# Patient Record
Sex: MALE | Race: WHITE | NOT HISPANIC OR LATINO | Employment: OTHER | URBAN - METROPOLITAN AREA
[De-identification: names, ages, dates, MRNs, and addresses within clinical notes are randomized per-mention and may not be internally consistent; named-entity substitution may affect disease eponyms.]

---

## 2019-12-05 ENCOUNTER — APPOINTMENT (OUTPATIENT)
Dept: RADIOLOGY | Facility: AMBULARY SURGERY CENTER | Age: 69
End: 2019-12-05
Attending: SURGERY
Payer: COMMERCIAL

## 2019-12-05 VITALS
DIASTOLIC BLOOD PRESSURE: 79 MMHG | SYSTOLIC BLOOD PRESSURE: 146 MMHG | HEIGHT: 68 IN | WEIGHT: 155 LBS | HEART RATE: 69 BPM | BODY MASS INDEX: 23.49 KG/M2

## 2019-12-05 DIAGNOSIS — M79.642 LEFT HAND PAIN: ICD-10-CM

## 2019-12-05 DIAGNOSIS — M19.042 ARTHRITIS OF FINGER OF LEFT HAND: Primary | ICD-10-CM

## 2019-12-05 PROCEDURE — 20600 DRAIN/INJ JOINT/BURSA W/O US: CPT | Performed by: SURGERY

## 2019-12-05 PROCEDURE — 73130 X-RAY EXAM OF HAND: CPT

## 2019-12-05 PROCEDURE — 99203 OFFICE O/P NEW LOW 30 MIN: CPT | Performed by: SURGERY

## 2019-12-05 RX ORDER — TRIAMCINOLONE ACETONIDE 40 MG/ML
20 INJECTION, SUSPENSION INTRA-ARTICULAR; INTRAMUSCULAR
Status: COMPLETED | OUTPATIENT
Start: 2019-12-05 | End: 2019-12-05

## 2019-12-05 RX ORDER — MONTELUKAST SODIUM 10 MG/1
10 TABLET ORAL AS NEEDED
COMMUNITY
End: 2021-12-17

## 2019-12-05 RX ORDER — LIDOCAINE HYDROCHLORIDE 10 MG/ML
1 INJECTION, SOLUTION INFILTRATION; PERINEURAL
Status: COMPLETED | OUTPATIENT
Start: 2019-12-05 | End: 2019-12-05

## 2019-12-05 RX ORDER — ROSUVASTATIN CALCIUM 10 MG/1
10 TABLET, COATED ORAL DAILY
COMMUNITY
End: 2021-03-11

## 2019-12-05 RX ORDER — DICLOFENAC SODIUM 75 MG/1
75 TABLET, DELAYED RELEASE ORAL 2 TIMES DAILY
COMMUNITY
End: 2020-10-23 | Stop reason: ALTCHOICE

## 2019-12-05 RX ORDER — AMLODIPINE BESYLATE AND ATORVASTATIN CALCIUM 5; 10 MG/1; MG/1
1 TABLET, FILM COATED ORAL DAILY
COMMUNITY
End: 2021-03-11

## 2019-12-05 RX ORDER — BUPIVACAINE HYDROCHLORIDE 2.5 MG/ML
0.5 INJECTION, SOLUTION INFILTRATION; PERINEURAL
Status: COMPLETED | OUTPATIENT
Start: 2019-12-05 | End: 2019-12-05

## 2019-12-05 RX ORDER — FINASTERIDE 5 MG/1
2.5 TABLET, FILM COATED ORAL
COMMUNITY
End: 2021-08-24 | Stop reason: SDUPTHER

## 2019-12-05 RX ADMIN — TRIAMCINOLONE ACETONIDE 20 MG: 40 INJECTION, SUSPENSION INTRA-ARTICULAR; INTRAMUSCULAR at 09:43

## 2019-12-05 RX ADMIN — BUPIVACAINE HYDROCHLORIDE 0.5 ML: 2.5 INJECTION, SOLUTION INFILTRATION; PERINEURAL at 09:43

## 2019-12-05 RX ADMIN — LIDOCAINE HYDROCHLORIDE 1 ML: 10 INJECTION, SOLUTION INFILTRATION; PERINEURAL at 09:43

## 2019-12-05 NOTE — PROGRESS NOTES
Mary VALLE  Attending, Orthopaedic Surgery  Hand, Wrist, and Elbow Surgery  [facility]      ORTHOPAEDIC HAND, WRIST, AND ELBOW OFFICE  VISIT       ASSESSMENT/PLAN:      77 y o  male with left small finger arthritis, specifically DIP and PIP joints  Treatment options were discussed with Shaun today  CSI and Voltaren Gel were discussed  Shelli Davis elected to proceed with a left small finger PIP joint CSI  Left small finger PIP joint CSI was performed in the office today, without complication  The CSI may be repeated every 3 months as needed  Voltaren Gel was prescribed and sent to his pharmacy electronically  He may use the gel up to 4x a day over his DIP and PIP joints  He was advised to keep his hands warm  He may soak his hands in warm water or use a warm compress  The patient verbalized understanding of exam findings and treatment plan  We engaged in the shared decision-making process and treatment options were discussed at length with the patient  Surgical and conservative management discussed today along with risks and benefits  Diagnoses and all orders for this visit:    Arthritis of finger of left hand  -     Small joint arthrocentesis: R small PIP    Left hand pain  -     XR hand 3+ vw left; Future    Other orders  -     rosuvastatin (CRESTOR) 10 MG tablet; Take 10 mg by mouth daily  -     finasteride (PROSCAR) 5 mg tablet; Take 5 mg by mouth daily  -     amLODIPine-atorvastatin (CADUET) 5-10 MG per tablet; Take 1 tablet by mouth daily  -     montelukast (SINGULAIR) 10 mg tablet; Take 10 mg by mouth daily at bedtime  -     diclofenac (VOLTAREN) 75 mg EC tablet; Take 75 mg by mouth 2 (two) times a day  -     diclofenac sodium (VOLTAREN) 1 %; Apply 2 g topically 4 (four) times a day        Follow Up:  Return in about 6 weeks (around 1/16/2020)      To Do Next Visit:  Re-evaluation of current issue      General Discussions:  Osteoarthritis:  The anatomy and physiology of osteoarthritis was discussed with the patient today in the office  Deterioration of the articular cartilage eventually leads to altered mobility at the joint, resulting in joint subluxation, osteophyte formation, cystic changes, as well as subchondral sclerosis  Eventually, pain, limited mobility, and compensatory hypermobility at surrounding joints may develop  While normal activity and usage of the joint may provide a painful experience to the patient, this typically does not result in damage to the limb  Treatment options include splints to decreased joint edema, pain, and inflammation  Therapy exercises to strengthen the surrounding musculature may relieve pain, but do not alter the overall continued development of osteoarthritis  Oral medications, topical medications, corticosteroid injections may decrease pain and increase overall function  Eventually, some patients may require surgical intervention  ____________________________________________________________________________________________________________________________________________      CHIEF COMPLAINT:  Chief Complaint   Patient presents with    Left Hand - Pain       SUBJECTIVE:  Brianna Goode is a 77y o  year old RHD male who presents to the office today for left small finger pain  Dannis Inks states that he has been experiencing pain to his left long finger DIP and PIP joints for years  He denies any injury or trama  He notes decreased flexion and difficulty making a tight fist  He has tried oral NSAID's in the past for pain, without significant relief  He describes his pain as achy        Pain/symptom timing:  Worse during the day when active  Pain/symptom context:  Worse with activites and work  Pain/symptom modifying factors:  Rest makes better, activities make worse  Pain/symptom associated signs/symptoms: none    Prior treatment   · NSAIDsNo   · Injections No   · Bracing/Orthotics No    Physical Therapy No     I have personally reviewed all the relevant PMH, PSH, SH, FH, Medications and allergies      PAST MEDICAL HISTORY:  History reviewed  No pertinent past medical history  PAST SURGICAL HISTORY:  History reviewed  No pertinent surgical history  FAMILY HISTORY:  History reviewed  No pertinent family history  SOCIAL HISTORY:  Social History     Tobacco Use    Smoking status: Never Smoker    Smokeless tobacco: Never Used   Substance Use Topics    Alcohol use: Not Currently    Drug use: Never       MEDICATIONS:    Current Outpatient Medications:     amLODIPine-atorvastatin (CADUET) 5-10 MG per tablet, Take 1 tablet by mouth daily, Disp: , Rfl:     diclofenac (VOLTAREN) 75 mg EC tablet, Take 75 mg by mouth 2 (two) times a day, Disp: , Rfl:     finasteride (PROSCAR) 5 mg tablet, Take 5 mg by mouth daily, Disp: , Rfl:     montelukast (SINGULAIR) 10 mg tablet, Take 10 mg by mouth daily at bedtime, Disp: , Rfl:     rosuvastatin (CRESTOR) 10 MG tablet, Take 10 mg by mouth daily, Disp: , Rfl:     ALLERGIES:  No Known Allergies        REVIEW OF SYSTEMS:  Review of Systems   Constitutional: Negative for chills, fever and unexpected weight change  HENT: Negative for hearing loss, nosebleeds and sore throat  Eyes: Negative for pain, redness and visual disturbance  Respiratory: Negative for cough, shortness of breath and wheezing  Cardiovascular: Negative for chest pain, palpitations and leg swelling  Gastrointestinal: Negative for abdominal pain, nausea and vomiting  Endocrine: Negative for polydipsia and polyuria  Genitourinary: Negative for difficulty urinating and hematuria  Musculoskeletal: Positive for arthralgias  Negative for joint swelling and myalgias  Skin: Negative for rash and wound  Neurological: Negative for dizziness, numbness and headaches  Psychiatric/Behavioral: Negative for decreased concentration, dysphoric mood and suicidal ideas  The patient is not nervous/anxious          VITALS:  Vitals: 12/05/19 0921   BP: 146/79   Pulse: 69       LABS:  HgA1c: No results found for: HGBA1C  BMP: No results found for: GLUCOSE, CALCIUM, NA, K, CO2, CL, BUN, CREATININE    _____________________________________________________  PHYSICAL EXAMINATION:  General: well developed and well nourished, alert, oriented times 3 and appears comfortable  Psychiatric: Normal  HEENT: Normocephalic, Atraumatic Trachea Midline, No torticollis  Pulmonary: No audible wheezing or respiratory distress   Cardiovascular: No pitting edema, 2+ radial pulse   Skin: No masses, erythema, lacerations, fluctation, ulcerations  Neurovascular: Sensation Intact to the Median, Ulnar, Radial Nerve, Motor Intact to the Median, Ulnar, Radial Nerve and Pulses Intact  Musculoskeletal: Normal, except as noted in detailed exam and in HPI        MUSCULOSKELETAL EXAMINATION:    Left small finger:     No erythema  No ecchymosis   No edema  PIP and DIP joint thickening   Full extension  Limited PIP joint flexion   Tender to palpation PIP and DIP joint, PIP more tender   Loose full composite fist possible   Brisk capillary refill     ___________________________________________________  STUDIES REVIEWED:  I have personally reviewed AP lateral and oblique radiographs of left hand demonstrate significant osteoarthritis of the DIP joints of the small and index fingers also degenerative changes less severe throughout including the PIP joint of the left small          PROCEDURES PERFORMED:  Small joint arthrocentesis: R small PIP  Date/Time: 12/5/2019 9:43 AM  Consent given by: patient  Site marked: site marked  Timeout: Immediately prior to procedure a time out was called to verify the correct patient, procedure, equipment, support staff and site/side marked as required   Supporting Documentation  Indications: pain   Procedure Details  Location: small finger - R small PIP  Preparation: Patient was prepped and draped in the usual sterile fashion  Needle size: 25 G  Ultrasound guidance: no  Medications administered: 0 5 mL bupivacaine 0 25 %; 1 mL lidocaine 1 %; 20 mg triamcinolone acetonide 40 mg/mL    Patient tolerance: patient tolerated the procedure well with no immediate complications  Dressing:  Sterile dressing applied           _____________________________________________________      Scribe Attestation    I,:   Kyara Ramirez am acting as a scribe while in the presence of the attending physician :        I,:   Estela Plummer MD personally performed the services described in this documentation    as scribed in my presence :

## 2020-10-23 DIAGNOSIS — M79.643 PAIN OF HAND, UNSPECIFIED LATERALITY: Primary | ICD-10-CM

## 2020-10-23 DIAGNOSIS — M19.042 ARTHRITIS OF FINGER OF LEFT HAND: Primary | ICD-10-CM

## 2020-10-23 DIAGNOSIS — M79.642 LEFT HAND PAIN: Primary | ICD-10-CM

## 2021-02-13 DIAGNOSIS — Z23 ENCOUNTER FOR IMMUNIZATION: ICD-10-CM

## 2021-02-24 ENCOUNTER — OFFICE VISIT (OUTPATIENT)
Dept: FAMILY MEDICINE CLINIC | Facility: OTHER | Age: 71
End: 2021-02-24
Payer: MEDICARE

## 2021-02-24 VITALS
BODY MASS INDEX: 24.48 KG/M2 | TEMPERATURE: 98.4 F | HEIGHT: 67 IN | HEART RATE: 75 BPM | SYSTOLIC BLOOD PRESSURE: 130 MMHG | DIASTOLIC BLOOD PRESSURE: 80 MMHG | WEIGHT: 156 LBS | OXYGEN SATURATION: 97 %

## 2021-02-24 DIAGNOSIS — E78.5 HYPERLIPIDEMIA, UNSPECIFIED HYPERLIPIDEMIA TYPE: ICD-10-CM

## 2021-02-24 DIAGNOSIS — K40.90 HERNIA, INGUINAL, RIGHT: ICD-10-CM

## 2021-02-24 DIAGNOSIS — Z12.5 SCREENING FOR PROSTATE CANCER: ICD-10-CM

## 2021-02-24 DIAGNOSIS — Z11.59 NEED FOR HEPATITIS C SCREENING TEST: ICD-10-CM

## 2021-02-24 DIAGNOSIS — I10 HYPERTENSION, UNSPECIFIED TYPE: ICD-10-CM

## 2021-02-24 DIAGNOSIS — M79.10 MYALGIA: Primary | ICD-10-CM

## 2021-02-24 PROCEDURE — 99203 OFFICE O/P NEW LOW 30 MIN: CPT | Performed by: FAMILY MEDICINE

## 2021-02-24 RX ORDER — ROSUVASTATIN CALCIUM 20 MG/1
20 TABLET, COATED ORAL
COMMUNITY
Start: 2020-11-13 | End: 2021-08-24 | Stop reason: SDUPTHER

## 2021-02-24 RX ORDER — MULTIVIT WITH MINERALS/LUTEIN
1000 TABLET ORAL DAILY
COMMUNITY

## 2021-02-24 RX ORDER — AMLODIPINE BESYLATE 5 MG/1
5 TABLET ORAL
COMMUNITY
Start: 2020-11-13 | End: 2021-08-24 | Stop reason: SDUPTHER

## 2021-02-24 RX ORDER — CHOLECALCIFEROL (VITAMIN D3) 25 MCG
1 CAPSULE ORAL
COMMUNITY

## 2021-02-24 RX ORDER — ASPIRIN 81 MG/1
81 TABLET, CHEWABLE ORAL
COMMUNITY

## 2021-02-24 RX ORDER — TURMERIC 400 MG
1 CAPSULE ORAL DAILY
COMMUNITY

## 2021-02-24 NOTE — PROGRESS NOTES
Assessment/Plan:    Hernia, inguinal, right  History and exam c/w nonincarcerated indirect inguinal hernia  Will refer to general surgery  Myalgia  Does not appear to be radicular or joint pain  Possibly caused by statin dosage increase  Will check CK before further workup which may require rheumatological labs or biopsy  Diagnoses and all orders for this visit:    Myalgia  -     CK (with reflex to MB); Future    Hernia, inguinal, right  -     Ambulatory referral to General Surgery; Future    Hypertension, unspecified type  -     Comprehensive metabolic panel; Future    Hyperlipidemia, unspecified hyperlipidemia type  -     Lipid Panel with Direct LDL reflex; Future    Need for hepatitis C screening test  -     Hepatitis C antibody; Future    Screening for prostate cancer  -     PSA, Total Screen; Future    Other orders  -     Cancel: Ambulatory referral for colonoscopy; Future  -     Cancel: Occult Blood, Fecal Immunochemical; Future  -     amLODIPine (NORVASC) 5 mg tablet  -     rosuvastatin (CRESTOR) 20 MG tablet  -     Cancel: Hepatitis C antibody; Future  -     diclofenac sodium (VOLTAREN) 50 mg EC tablet; Take 100 mg by mouth 2 (two) times a day  -     aspirin 81 mg chewable tablet; Chew 81 mg daily  -     Multiple Vitamin (MULTIVITAMIN ADULT PO); Take 1 tablet by mouth daily  -     Cancel: PSA, total and free; Future  -     Turmeric 400 MG CAPS; Take 1 capsule by mouth daily  -     Ascorbic Acid (vitamin C) 1000 MG tablet; Take 1,000 mg by mouth daily  -     Cholecalciferol (Vitamin D-3) 25 MCG (1000 UT) CAPS; Take 1 capsule by mouth          Subjective:      Patient ID: Landon Covert is a 72 y o  male  Pt [facility] to [facility] care and reports L thigh pain as well as R groin pain  He states that the L thigh pain has been present for about 6 months and seems to radiate from the front of his thigh up to his hip and sometimes into his back   He says he feels the pain a little bit in his R thigh as well but it is mostly in the L thigh  He describes the pain as a sensation of pressure or tightness that becomes severe with walking and climbing stairs  He reports resolution of the pain when lying down and also improvement from Voltaren tablets  He denies any back surgeries, any injuries, and any weakness or numbness  He does report that his Crestor dose was increased around the time the pain began  The patient also states that he has developed a bulge in his R groin since about 3 months ago that sometimes hurts a little  He reports lifting small weights as well as abdominal exercises but denies heavy lifting  He does endorse cocasional hard stools with some straining  Review of Systems   Constitutional: Negative for chills, fever and unexpected weight change  HENT: Negative for congestion, rhinorrhea and sore throat  Eyes: Negative for visual disturbance  Respiratory: Negative for cough and shortness of breath  Cardiovascular: Negative for chest pain and leg swelling  Gastrointestinal: Positive for constipation  Negative for abdominal pain, blood in stool, diarrhea, nausea and vomiting  Genitourinary: Negative for dysuria and hematuria  Musculoskeletal: Positive for arthralgias and myalgias  Skin: Negative for rash  Neurological: Negative for dizziness, light-headedness and headaches  Psychiatric/Behavioral: Negative for dysphoric mood  All other systems reviewed and are negative  Objective:      /80 (BP Location: Left arm, Patient Position: Sitting, Cuff Size: Large)   Pulse 75   Temp 98 4 °F (36 9 °C) (Temporal)   Ht 5' 6 5" (1 689 m)   Wt 70 8 kg (156 lb)   SpO2 97%   BMI 24 80 kg/m²          Physical Exam  Vitals signs reviewed  Constitutional:       General: He is not in acute distress  Appearance: He is well-developed  He is not diaphoretic  HENT:      Head: Normocephalic and atraumatic        Right Ear: External ear normal       Left Ear: External ear normal       Mouth/Throat:      Mouth: Mucous membranes are moist       Pharynx: Oropharynx is clear  No oropharyngeal exudate or posterior oropharyngeal erythema  Eyes:      Extraocular Movements: Extraocular movements intact  Conjunctiva/sclera: Conjunctivae normal       Pupils: Pupils are equal, round, and reactive to light  Neck:      Musculoskeletal: Normal range of motion and neck supple  Cardiovascular:      Rate and Rhythm: Normal rate and regular rhythm  Pulses: Normal pulses  Heart sounds: Normal heart sounds  No murmur  No friction rub  No gallop  Pulmonary:      Effort: Pulmonary effort is normal  No respiratory distress  Breath sounds: Normal breath sounds  No stridor  No wheezing, rhonchi or rales  Abdominal:      General: Bowel sounds are normal  There is no distension  Palpations: Abdomen is soft  There is no mass  Tenderness: There is no abdominal tenderness  There is no right CVA tenderness, left CVA tenderness or guarding  Hernia: A hernia (R inguinal) is present  Musculoskeletal: Normal range of motion  General: Deformity (Heberden's and Bouchards nodes) present  No tenderness (bilateral thighs)  Comments: Negative SLR bilaterally   Lymphadenopathy:      Cervical: No cervical adenopathy  Skin:     General: Skin is warm and dry  Findings: No rash  Neurological:      General: No focal deficit present  Mental Status: He is alert and oriented to person, place, and time  Sensory: No sensory deficit  Motor: No weakness     Psychiatric:         Mood and Affect: Mood normal          Behavior: Behavior normal

## 2021-02-25 ENCOUNTER — LAB (OUTPATIENT)
Dept: LAB | Facility: AMBULARY SURGERY CENTER | Age: 71
End: 2021-02-25
Payer: MEDICARE

## 2021-02-25 DIAGNOSIS — I10 HYPERTENSION, UNSPECIFIED TYPE: ICD-10-CM

## 2021-02-25 DIAGNOSIS — Z12.5 SCREENING FOR PROSTATE CANCER: ICD-10-CM

## 2021-02-25 DIAGNOSIS — Z11.59 NEED FOR HEPATITIS C SCREENING TEST: ICD-10-CM

## 2021-02-25 DIAGNOSIS — M79.10 MYALGIA: ICD-10-CM

## 2021-02-25 DIAGNOSIS — E78.5 HYPERLIPIDEMIA, UNSPECIFIED HYPERLIPIDEMIA TYPE: ICD-10-CM

## 2021-02-25 LAB
ALBUMIN SERPL BCP-MCNC: 3.8 G/DL (ref 3.5–5)
ALP SERPL-CCNC: 55 U/L (ref 46–116)
ALT SERPL W P-5'-P-CCNC: 25 U/L (ref 12–78)
ANION GAP SERPL CALCULATED.3IONS-SCNC: 1 MMOL/L (ref 4–13)
AST SERPL W P-5'-P-CCNC: 16 U/L (ref 5–45)
BILIRUB SERPL-MCNC: 0.45 MG/DL (ref 0.2–1)
BUN SERPL-MCNC: 22 MG/DL (ref 5–25)
CALCIUM SERPL-MCNC: 9.5 MG/DL (ref 8.3–10.1)
CHLORIDE SERPL-SCNC: 108 MMOL/L (ref 100–108)
CHOLEST SERPL-MCNC: 187 MG/DL (ref 50–200)
CK MB SERPL-MCNC: 1.3 % (ref 0–2.5)
CK MB SERPL-MCNC: 2.3 NG/ML (ref 0–5)
CK SERPL-CCNC: 179 U/L (ref 39–308)
CO2 SERPL-SCNC: 31 MMOL/L (ref 21–32)
CREAT SERPL-MCNC: 1.05 MG/DL (ref 0.6–1.3)
GFR SERPL CREATININE-BSD FRML MDRD: 72 ML/MIN/1.73SQ M
GLUCOSE P FAST SERPL-MCNC: 115 MG/DL (ref 65–99)
HCV AB SER QL: NORMAL
HDLC SERPL-MCNC: 47 MG/DL
LDLC SERPL CALC-MCNC: 118 MG/DL (ref 0–100)
POTASSIUM SERPL-SCNC: 4.4 MMOL/L (ref 3.5–5.3)
PROT SERPL-MCNC: 7.1 G/DL (ref 6.4–8.2)
PSA SERPL-MCNC: 0.3 NG/ML (ref 0–4)
SODIUM SERPL-SCNC: 140 MMOL/L (ref 136–145)
TRIGL SERPL-MCNC: 111 MG/DL

## 2021-02-25 PROCEDURE — 82553 CREATINE MB FRACTION: CPT

## 2021-02-25 PROCEDURE — 86803 HEPATITIS C AB TEST: CPT

## 2021-02-25 PROCEDURE — 82550 ASSAY OF CK (CPK): CPT

## 2021-02-25 PROCEDURE — 36415 COLL VENOUS BLD VENIPUNCTURE: CPT

## 2021-02-25 PROCEDURE — G0103 PSA SCREENING: HCPCS

## 2021-02-25 PROCEDURE — 80053 COMPREHEN METABOLIC PANEL: CPT

## 2021-02-25 PROCEDURE — 80061 LIPID PANEL: CPT

## 2021-02-25 NOTE — ASSESSMENT & PLAN NOTE
Does not appear to be radicular or joint pain  Possibly caused by statin dosage increase  Will check CK before further workup which may require rheumatological labs or biopsy

## 2021-03-03 ENCOUNTER — PREP FOR PROCEDURE (OUTPATIENT)
Dept: SURGERY | Facility: CLINIC | Age: 71
End: 2021-03-03

## 2021-03-03 ENCOUNTER — CONSULT (OUTPATIENT)
Dept: SURGERY | Facility: CLINIC | Age: 71
End: 2021-03-03
Payer: MEDICARE

## 2021-03-03 ENCOUNTER — APPOINTMENT (OUTPATIENT)
Dept: LAB | Facility: CLINIC | Age: 71
End: 2021-03-03
Payer: MEDICARE

## 2021-03-03 ENCOUNTER — OFFICE VISIT (OUTPATIENT)
Dept: LAB | Facility: CLINIC | Age: 71
End: 2021-03-03
Payer: MEDICARE

## 2021-03-03 VITALS — HEIGHT: 68 IN | WEIGHT: 156 LBS | BODY MASS INDEX: 23.64 KG/M2

## 2021-03-03 DIAGNOSIS — K40.90 INGUINAL HERNIA: Primary | ICD-10-CM

## 2021-03-03 DIAGNOSIS — K40.90 INGUINAL HERNIA: ICD-10-CM

## 2021-03-03 DIAGNOSIS — K40.90 HERNIA, INGUINAL, RIGHT: ICD-10-CM

## 2021-03-03 LAB
ERYTHROCYTE [DISTWIDTH] IN BLOOD BY AUTOMATED COUNT: 13.8 % (ref 11.6–15.1)
HCT VFR BLD AUTO: 44.6 % (ref 36.5–49.3)
HGB BLD-MCNC: 14.4 G/DL (ref 12–17)
MCH RBC QN AUTO: 29.8 PG (ref 26.8–34.3)
MCHC RBC AUTO-ENTMCNC: 32.3 G/DL (ref 31.4–37.4)
MCV RBC AUTO: 92 FL (ref 82–98)
PLATELET # BLD AUTO: 201 THOUSANDS/UL (ref 149–390)
PMV BLD AUTO: 10.9 FL (ref 8.9–12.7)
RBC # BLD AUTO: 4.83 MILLION/UL (ref 3.88–5.62)
WBC # BLD AUTO: 5.85 THOUSAND/UL (ref 4.31–10.16)

## 2021-03-03 PROCEDURE — 93005 ELECTROCARDIOGRAM TRACING: CPT

## 2021-03-03 PROCEDURE — 36415 COLL VENOUS BLD VENIPUNCTURE: CPT

## 2021-03-03 PROCEDURE — 85027 COMPLETE CBC AUTOMATED: CPT

## 2021-03-03 PROCEDURE — 99203 OFFICE O/P NEW LOW 30 MIN: CPT | Performed by: SURGERY

## 2021-03-03 NOTE — PROGRESS NOTES
Assessment/Plan:    Diagnoses and all orders for this visit:    Hernia, inguinal, right  -     Ambulatory referral to General Surgery      Risks and benefits for a right inguinal hernia repair including the potential for spermatic cord injury, recurrence, or pain issues were discussed with him and he agrees to proceed  Subjective:      Patient ID: Sylvia Galvez is a 80 y o  male  Patient presents for right inguinal hernia consult  States he has had a bulge and dull pain RLQ for 2 months  Limits his activities  Tends to feel some achiness by the end of the day  Very comfortable in the morning upon awakening  Discomfort and lump seems to progress as the day goes on  Denies any urinary or bowel complaints      The following portions of the patient's history were reviewed and updated as appropriate:     He  has a past medical history of Hypertension and Lung nodule  He  has a past surgical history that includes Shoulder surgery (Right, 2018); Bunionectomy (Right); and Hand surgery (Right)  His family history includes Diabetes in his mother; Heart attack in his father  He  reports that he quit smoking about 42 years ago  He has never used smokeless tobacco  He reports previous alcohol use of about 6 0 - 7 0 standard drinks of alcohol per week  He reports that he does not use drugs    Current Outpatient Medications   Medication Sig Dispense Refill    amLODIPine (NORVASC) 5 mg tablet       amLODIPine-atorvastatin (CADUET) 5-10 MG per tablet Take 1 tablet by mouth daily      Ascorbic Acid (vitamin C) 1000 MG tablet Take 1,000 mg by mouth daily      aspirin 81 mg chewable tablet Chew 81 mg daily      Cholecalciferol (Vitamin D-3) 25 MCG (1000 UT) CAPS Take 1 capsule by mouth      diclofenac sodium (VOLTAREN) 1 % Apply 2 g topically 4 (four) times a day 1 Tube 3    diclofenac sodium (VOLTAREN) 50 mg EC tablet Take 100 mg by mouth 2 (two) times a day      finasteride (PROSCAR) 5 mg tablet Take 2 5 mg by mouth daily       montelukast (SINGULAIR) 10 mg tablet Take 10 mg by mouth daily at bedtime      Multiple Vitamin (MULTIVITAMIN ADULT PO) Take 1 tablet by mouth daily      rosuvastatin (CRESTOR) 10 MG tablet Take 10 mg by mouth daily      rosuvastatin (CRESTOR) 20 MG tablet       Turmeric 400 MG CAPS Take 1 capsule by mouth daily       No current facility-administered medications for this visit  He has No Known Allergies       Review of Systems   All other systems reviewed and are negative  Objective:      Ht 5' 8" (1 727 m)   Wt 70 8 kg (156 lb)   BMI 23 72 kg/m²          Physical Exam  Constitutional:       Appearance: Normal appearance  He is normal weight  HENT:      Head: Normocephalic  Mouth/Throat:      Mouth: Mucous membranes are moist    Eyes:      Extraocular Movements: Extraocular movements intact  Neck:      Musculoskeletal: Normal range of motion and neck supple  Cardiovascular:      Rate and Rhythm: Normal rate and regular rhythm  Pulmonary:      Effort: Pulmonary effort is normal       Breath sounds: Normal breath sounds  Abdominal:      General: Abdomen is flat  Bowel sounds are normal       Palpations: Abdomen is soft  Hernia: A hernia (Reducible right inguinal hernia  No left inguinal hernia) is present  Skin:     General: Skin is warm and dry  Neurological:      Mental Status: He is alert and oriented to person, place, and time     Psychiatric:         Mood and Affect: Mood normal

## 2021-03-05 LAB
ATRIAL RATE: 52 BPM
P AXIS: 67 DEGREES
PR INTERVAL: 146 MS
QRS AXIS: 29 DEGREES
QRSD INTERVAL: 78 MS
QT INTERVAL: 418 MS
QTC INTERVAL: 388 MS
T WAVE AXIS: 43 DEGREES
VENTRICULAR RATE: 52 BPM

## 2021-03-05 PROCEDURE — 93010 ELECTROCARDIOGRAM REPORT: CPT | Performed by: INTERNAL MEDICINE

## 2021-03-06 PROBLEM — R73.03 PREDIABETES: Status: ACTIVE | Noted: 2021-03-06

## 2021-03-06 NOTE — RESULT ENCOUNTER NOTE
Discussed results with pt including nml CK level and elevated fasting blood glucose in prediabetes range  Pt says he will attempt to improve this with diet and exercise

## 2021-03-07 ENCOUNTER — IMMUNIZATIONS (OUTPATIENT)
Dept: FAMILY MEDICINE CLINIC | Facility: HOSPITAL | Age: 71
End: 2021-03-07

## 2021-03-07 DIAGNOSIS — Z23 ENCOUNTER FOR IMMUNIZATION: Primary | ICD-10-CM

## 2021-03-07 PROCEDURE — 0001A SARS-COV-2 / COVID-19 MRNA VACCINE (PFIZER-BIONTECH) 30 MCG: CPT

## 2021-03-07 PROCEDURE — 91300 SARS-COV-2 / COVID-19 MRNA VACCINE (PFIZER-BIONTECH) 30 MCG: CPT

## 2021-03-10 ENCOUNTER — ANESTHESIA EVENT (OUTPATIENT)
Dept: PERIOP | Facility: AMBULARY SURGERY CENTER | Age: 71
End: 2021-03-10
Payer: MEDICARE

## 2021-03-11 RX ORDER — CEFAZOLIN SODIUM 2 G/50ML
2000 SOLUTION INTRAVENOUS ONCE
Status: CANCELLED | OUTPATIENT
Start: 2021-03-15

## 2021-03-11 NOTE — PRE-PROCEDURE INSTRUCTIONS
Pre-Surgery Instructions:   Medication Instructions    amLODIPine (NORVASC) 5 mg tablet Instructed patient per Anesthesia Guidelines   Ascorbic Acid (vitamin C) 1000 MG tablet Patient was instructed by Physician and understands   aspirin 81 mg chewable tablet Patient was instructed by Physician and understands   Cholecalciferol (Vitamin D-3) 25 MCG (1000 UT) CAPS Patient was instructed by Physician and understands   diclofenac sodium (VOLTAREN) 1 % Patient was instructed by Physician and understands   diclofenac sodium (VOLTAREN) 50 mg EC tablet Patient was instructed by Physician and understands   finasteride (PROSCAR) 5 mg tablet Instructed patient per Anesthesia Guidelines   montelukast (SINGULAIR) 10 mg tablet Instructed patient per Anesthesia Guidelines   Multiple Vitamin (MULTIVITAMIN ADULT PO) Patient was instructed by Physician and understands   rosuvastatin (CRESTOR) 20 MG tablet Patient was instructed by Physician and understands   Turmeric 400 MG CAPS Patient was instructed by Physician and understands  Pre op and bathing instructions reviewed  Pt has hibiclens  Pt  Verbalized understanding of current visitor restrictions  Pt  Verbalized an understanding of all instructions reviewed and offers no concerns at this time  Instructed to avoid all ASA/NSAIDs and OTC Vit/Supp, Voltaren from now until after surgery   Tylenol ok prn( Pt takes ASA prophylacticly)  Instructed to take per normal schedule except DOS

## 2021-03-14 NOTE — ANESTHESIA PREPROCEDURE EVALUATION
Procedure:  INGUINAL HERNIA REPAIR (Right Groin)    Relevant Problems   CARDIO   (+) Hyperlipidemia   (+) Hypertension        Physical Exam    Airway    Mallampati score: II  TM Distance: >3 FB  Neck ROM: full     Dental   No notable dental hx     Cardiovascular      Pulmonary      Other Findings        Anesthesia Plan  ASA Score- 2     Anesthesia Type- general with ASA Monitors  Additional Monitors:   Airway Plan: LMA  Plan Factors-Exercise tolerance (METS): >4 METS  Chart reviewed  Patient is not a current smoker  Patient not instructed to abstain from smoking on day of procedure  Patient did not smoke on day of surgery  Induction- intravenous  Postoperative Plan-     Informed Consent- Anesthetic plan and risks discussed with patient  I personally reviewed this patient with the CRNA  Discussed and agreed on the Anesthesia Plan with the CRNA             Lab Results   Component Value Date    GLUF 115 (H) 02/25/2021    ALT 25 02/25/2021    AST 16 02/25/2021    BUN 22 02/25/2021    CALCIUM 9 5 02/25/2021     02/25/2021    CO2 31 02/25/2021    CREATININE 1 05 02/25/2021    HDL 47 02/25/2021    HCT 44 6 03/03/2021    HGB 14 4 03/03/2021     03/03/2021    K 4 4 02/25/2021    PSA 0 3 02/25/2021    TRIG 111 02/25/2021    WBC 5 85 03/03/2021

## 2021-03-15 ENCOUNTER — ANESTHESIA (OUTPATIENT)
Dept: PERIOP | Facility: AMBULARY SURGERY CENTER | Age: 71
End: 2021-03-15
Payer: MEDICARE

## 2021-03-15 ENCOUNTER — HOSPITAL ENCOUNTER (OUTPATIENT)
Facility: AMBULARY SURGERY CENTER | Age: 71
Setting detail: OUTPATIENT SURGERY
Discharge: HOME | End: 2021-03-15
Attending: SURGERY | Admitting: SURGERY
Payer: MEDICARE

## 2021-03-15 VITALS
SYSTOLIC BLOOD PRESSURE: 120 MMHG | OXYGEN SATURATION: 95 % | BODY MASS INDEX: 23.64 KG/M2 | TEMPERATURE: 97.4 F | WEIGHT: 156 LBS | HEIGHT: 68 IN | RESPIRATION RATE: 20 BRPM | HEART RATE: 68 BPM | DIASTOLIC BLOOD PRESSURE: 68 MMHG

## 2021-03-15 DIAGNOSIS — K40.90 HERNIA, INGUINAL, RIGHT: Primary | ICD-10-CM

## 2021-03-15 PROCEDURE — C1781 MESH (IMPLANTABLE): HCPCS | Performed by: SURGERY

## 2021-03-15 PROCEDURE — 49505 PRP I/HERN INIT REDUC >5 YR: CPT | Performed by: SURGERY

## 2021-03-15 DEVICE — BARD MESH PERFIX PLUG, EXTRA LARGE
Type: IMPLANTABLE DEVICE | Site: INGUINAL | Status: FUNCTIONAL
Brand: BARD MESH PERFIX PLUG

## 2021-03-15 RX ORDER — DEXAMETHASONE SODIUM PHOSPHATE 10 MG/ML
INJECTION, SOLUTION INTRAMUSCULAR; INTRAVENOUS AS NEEDED
Status: DISCONTINUED | OUTPATIENT
Start: 2021-03-15 | End: 2021-03-15

## 2021-03-15 RX ORDER — MAGNESIUM HYDROXIDE 1200 MG/15ML
LIQUID ORAL AS NEEDED
Status: DISCONTINUED | OUTPATIENT
Start: 2021-03-15 | End: 2021-03-15 | Stop reason: HOSPADM

## 2021-03-15 RX ORDER — EPHEDRINE SULFATE 50 MG/ML
INJECTION INTRAVENOUS AS NEEDED
Status: DISCONTINUED | OUTPATIENT
Start: 2021-03-15 | End: 2021-03-15

## 2021-03-15 RX ORDER — ONDANSETRON 2 MG/ML
4 INJECTION INTRAMUSCULAR; INTRAVENOUS EVERY 4 HOURS PRN
Status: DISCONTINUED | OUTPATIENT
Start: 2021-03-15 | End: 2021-03-15 | Stop reason: HOSPADM

## 2021-03-15 RX ORDER — ONDANSETRON 2 MG/ML
INJECTION INTRAMUSCULAR; INTRAVENOUS AS NEEDED
Status: DISCONTINUED | OUTPATIENT
Start: 2021-03-15 | End: 2021-03-15

## 2021-03-15 RX ORDER — FENTANYL CITRATE 50 UG/ML
INJECTION, SOLUTION INTRAMUSCULAR; INTRAVENOUS AS NEEDED
Status: DISCONTINUED | OUTPATIENT
Start: 2021-03-15 | End: 2021-03-15

## 2021-03-15 RX ORDER — CEFAZOLIN SODIUM 1 G/50ML
SOLUTION INTRAVENOUS AS NEEDED
Status: DISCONTINUED | OUTPATIENT
Start: 2021-03-15 | End: 2021-03-15

## 2021-03-15 RX ORDER — SODIUM CHLORIDE, SODIUM LACTATE, POTASSIUM CHLORIDE, CALCIUM CHLORIDE 600; 310; 30; 20 MG/100ML; MG/100ML; MG/100ML; MG/100ML
125 INJECTION, SOLUTION INTRAVENOUS CONTINUOUS
Status: DISCONTINUED | OUTPATIENT
Start: 2021-03-15 | End: 2021-03-15 | Stop reason: HOSPADM

## 2021-03-15 RX ORDER — PROPOFOL 10 MG/ML
INJECTION, EMULSION INTRAVENOUS AS NEEDED
Status: DISCONTINUED | OUTPATIENT
Start: 2021-03-15 | End: 2021-03-15

## 2021-03-15 RX ORDER — LIDOCAINE HYDROCHLORIDE 10 MG/ML
0.5 INJECTION, SOLUTION EPIDURAL; INFILTRATION; INTRACAUDAL; PERINEURAL ONCE AS NEEDED
Status: DISCONTINUED | OUTPATIENT
Start: 2021-03-15 | End: 2021-03-15 | Stop reason: HOSPADM

## 2021-03-15 RX ORDER — FENTANYL CITRATE/PF 50 MCG/ML
25 SYRINGE (ML) INJECTION
Status: DISCONTINUED | OUTPATIENT
Start: 2021-03-15 | End: 2021-03-15 | Stop reason: HOSPADM

## 2021-03-15 RX ORDER — BUPIVACAINE HYDROCHLORIDE 2.5 MG/ML
INJECTION, SOLUTION EPIDURAL; INFILTRATION; INTRACAUDAL AS NEEDED
Status: DISCONTINUED | OUTPATIENT
Start: 2021-03-15 | End: 2021-03-15 | Stop reason: HOSPADM

## 2021-03-15 RX ORDER — SODIUM CHLORIDE, SODIUM LACTATE, POTASSIUM CHLORIDE, CALCIUM CHLORIDE 600; 310; 30; 20 MG/100ML; MG/100ML; MG/100ML; MG/100ML
20 INJECTION, SOLUTION INTRAVENOUS CONTINUOUS
Status: DISCONTINUED | OUTPATIENT
Start: 2021-03-15 | End: 2021-03-15 | Stop reason: HOSPADM

## 2021-03-15 RX ORDER — MIDAZOLAM HYDROCHLORIDE 2 MG/2ML
INJECTION, SOLUTION INTRAMUSCULAR; INTRAVENOUS AS NEEDED
Status: DISCONTINUED | OUTPATIENT
Start: 2021-03-15 | End: 2021-03-15

## 2021-03-15 RX ORDER — MORPHINE SULFATE 4 MG/ML
4 INJECTION, SOLUTION INTRAMUSCULAR; INTRAVENOUS
Status: DISCONTINUED | OUTPATIENT
Start: 2021-03-15 | End: 2021-03-15 | Stop reason: HOSPADM

## 2021-03-15 RX ORDER — KETOROLAC TROMETHAMINE 30 MG/ML
INJECTION, SOLUTION INTRAMUSCULAR; INTRAVENOUS AS NEEDED
Status: DISCONTINUED | OUTPATIENT
Start: 2021-03-15 | End: 2021-03-15

## 2021-03-15 RX ORDER — ONDANSETRON 2 MG/ML
4 INJECTION INTRAMUSCULAR; INTRAVENOUS ONCE AS NEEDED
Status: DISCONTINUED | OUTPATIENT
Start: 2021-03-15 | End: 2021-03-15 | Stop reason: HOSPADM

## 2021-03-15 RX ORDER — OXYCODONE HYDROCHLORIDE 5 MG/1
5 TABLET ORAL EVERY 4 HOURS PRN
Qty: 10 TABLET | Refills: 0 | Status: SHIPPED | OUTPATIENT
Start: 2021-03-15 | End: 2021-03-25

## 2021-03-15 RX ORDER — LIDOCAINE HYDROCHLORIDE 10 MG/ML
INJECTION, SOLUTION EPIDURAL; INFILTRATION; INTRACAUDAL; PERINEURAL AS NEEDED
Status: DISCONTINUED | OUTPATIENT
Start: 2021-03-15 | End: 2021-03-15

## 2021-03-15 RX ADMIN — EPHEDRINE SULFATE 15 MG: 50 INJECTION, SOLUTION INTRAVENOUS at 12:12

## 2021-03-15 RX ADMIN — DEXAMETHASONE SODIUM PHOSPHATE 4 MG: 10 INJECTION, SOLUTION INTRAMUSCULAR; INTRAVENOUS at 12:16

## 2021-03-15 RX ADMIN — FENTANYL CITRATE 25 MCG: 50 INJECTION INTRAMUSCULAR; INTRAVENOUS at 13:17

## 2021-03-15 RX ADMIN — FENTANYL CITRATE 25 MCG: 50 INJECTION, SOLUTION INTRAMUSCULAR; INTRAVENOUS at 12:27

## 2021-03-15 RX ADMIN — SODIUM CHLORIDE, SODIUM LACTATE, POTASSIUM CHLORIDE, AND CALCIUM CHLORIDE: .6; .31; .03; .02 INJECTION, SOLUTION INTRAVENOUS at 11:56

## 2021-03-15 RX ADMIN — CEFAZOLIN SODIUM 1000 MG: 1 SOLUTION INTRAVENOUS at 12:05

## 2021-03-15 RX ADMIN — FENTANYL CITRATE 25 MCG: 50 INJECTION, SOLUTION INTRAMUSCULAR; INTRAVENOUS at 12:09

## 2021-03-15 RX ADMIN — FENTANYL CITRATE 25 MCG: 50 INJECTION INTRAMUSCULAR; INTRAVENOUS at 13:23

## 2021-03-15 RX ADMIN — KETOROLAC TROMETHAMINE 30 MG: 30 INJECTION, SOLUTION INTRAMUSCULAR at 12:45

## 2021-03-15 RX ADMIN — PROPOFOL 50 MG: 10 INJECTION, EMULSION INTRAVENOUS at 12:16

## 2021-03-15 RX ADMIN — PROPOFOL 150 MG: 10 INJECTION, EMULSION INTRAVENOUS at 12:07

## 2021-03-15 RX ADMIN — MIDAZOLAM HYDROCHLORIDE 2 MG: 1 INJECTION, SOLUTION INTRAMUSCULAR; INTRAVENOUS at 12:04

## 2021-03-15 RX ADMIN — FENTANYL CITRATE 25 MCG: 50 INJECTION, SOLUTION INTRAMUSCULAR; INTRAVENOUS at 12:07

## 2021-03-15 RX ADMIN — LIDOCAINE HYDROCHLORIDE 50 MG: 10 INJECTION, SOLUTION EPIDURAL; INFILTRATION; INTRACAUDAL at 12:07

## 2021-03-15 RX ADMIN — FENTANYL CITRATE 25 MCG: 50 INJECTION, SOLUTION INTRAMUSCULAR; INTRAVENOUS at 12:26

## 2021-03-15 RX ADMIN — ONDANSETRON 4 MG: 2 INJECTION INTRAMUSCULAR; INTRAVENOUS at 12:17

## 2021-03-15 NOTE — ANESTHESIA POSTPROCEDURE EVALUATION
Post-Op Assessment Note    CV Status:  Stable    Pain management: adequate     Mental Status:  Alert   Hydration Status:  Stable   PONV Controlled:  None   Airway Patency:  Patent      Post Op Vitals Reviewed: Yes      Staff: Anesthesiologist, CRNA   Comments: vss        No complications documented      /74 (03/15/21 1300)    Temp (!) 97 4 °F (36 3 °C) (03/15/21 1300)    Pulse 63 (03/15/21 1300)   Resp 15 (03/15/21 1300)    SpO2 99 % (03/15/21 1300)

## 2021-03-15 NOTE — OP NOTE
OPERATIVE REPORT  PATIENT NAME: Eneida Parra    :  1950  MRN: [id number]  Pt Location: [facility] OR ROOM 04    SURGERY DATE: 3/15/2021    Surgeon(s) and Role:     * Ana Giron DO - Primary     * Ramírez Champagne MD - Assisting    Preop Diagnosis:  Inguinal hernia [K40 90]    Post-Op Diagnosis Codes:     * Inguinal hernia [K40 90]    Procedure(s) (LRB):  INGUINAL HERNIA REPAIR (Right) with extra-large plug and patch    Specimen(s):  * No specimens in log *    Estimated Blood Loss:   Minimal    Drains:  * No LDAs found *    Anesthesia Type:   General/local    Operative Indications:  Inguinal hernia [K40 90]      Operative Findings:  Indirect right inguinal hernia  ASA 2  Wound class 1  Height 68 in weight 71 kg/156 lb  BMI 24    Complications:   None    Procedure and Technique:  Patient was brought the operative suite and identified by visualization, conversation, by armband  Sequential compression pumps were placed  He was given Ancef perioperatively  Once under anesthesia abdomen is then prepped and draped in a sterile fashion  Time-out was performed was assured that the prep was dry  Local was instilled over the right inguinal canal   Oblique skin incision was made in the subcutaneous tissues were divided with hot cautery down to the external oblique fascia  Opened up this fascia through the external ring to level the internal ring  Melissa retractors placed for exposure  Cremasteric fibers were skeletonized off the cord structures  Cord structures were then encircled with a Penrose drain  Indirect hernia sac as well as preperitoneal lipoma were identified and dissected out a high fashion reduced into the internal ring defect  Preperitoneal space was developed with a 4 x 4 sponge  Extra-large plug was placed into the defect  Inner leaves were anchored each side of the transversalis tissues with 2-0 Vicryl    Onlay mesh was placed on the floor the canal and anchored with 2-0 Vicryl to the pubic tubercle, shelving edge common co joint tendon  I extended the initial keyhole cut in the onlay mesh to allow room for the cord structures  The result 2 tails were brought around the cord and a good cells as well as the underlying transversalis tissues with 2-0 Vicryl  Remaining tails were tucked under the external oblique fascia  Irrigation is carried out  More local was instilled  External oblique fascia was reapproximated on top of the closed with a running 2-0 Vicryl suture  Nilo's was reapproximated in 2 0 Vicryl in a simple fashion  Skin was closing 4 Monocryl in a subcuticular fashion  Was washed and dried  Sterile skin glue was applied  He was assured the testicles in the scrotum at the completion the case  He was awake in the operating returned to the recovery area in stable condition having tolerated the procedure well     I was present for the entire procedure    Patient Disposition:  PACU     SIGNATURE: Yemi Bertrand, DO  DATE: March 15, 2021  TIME: 12:47 PM

## 2021-03-15 NOTE — INTERVAL H&P NOTE
H&P reviewed  After examining the patient I find no changes in the patients condition since the H&P had been written      Vitals:    03/15/21 1039   BP: (!) 172/76   Pulse: 76   Resp: 18   Temp: (!) 97 3 °F (36 3 °C)   SpO2: 97%

## 2021-03-28 ENCOUNTER — IMMUNIZATIONS (OUTPATIENT)
Dept: FAMILY MEDICINE CLINIC | Facility: HOSPITAL | Age: 71
End: 2021-03-28

## 2021-03-28 DIAGNOSIS — Z23 ENCOUNTER FOR IMMUNIZATION: Primary | ICD-10-CM

## 2021-03-28 PROCEDURE — 91300 SARS-COV-2 / COVID-19 MRNA VACCINE (PFIZER-BIONTECH) 30 MCG: CPT

## 2021-03-28 PROCEDURE — 0002A SARS-COV-2 / COVID-19 MRNA VACCINE (PFIZER-BIONTECH) 30 MCG: CPT

## 2021-03-31 ENCOUNTER — OFFICE VISIT (OUTPATIENT)
Dept: SURGERY | Facility: CLINIC | Age: 71
End: 2021-03-31

## 2021-03-31 DIAGNOSIS — K40.90 HERNIA, INGUINAL, RIGHT: Primary | ICD-10-CM

## 2021-03-31 PROCEDURE — 99024 POSTOP FOLLOW-UP VISIT: CPT | Performed by: SURGERY

## 2021-03-31 PROCEDURE — 1124F ACP DISCUSS-NO DSCNMKR DOCD: CPT | Performed by: SURGERY

## 2021-03-31 NOTE — PROGRESS NOTES
Assessment/Plan:    Diagnoses and all orders for this visit:    Hernia, inguinal, right     doing well status post right inguinal hernia repair  As of April 15th of may resume unrestricted activities  Asked him to call with questions or concerns      Postoperative restrictions reviewed  All questions answered  ______________________________________________________  HPI: Patient presents post operatively  Right inguinal hernia repair 3/15/2021 with plug and patch             ROS:  General ROS: negative for - chills, fatigue, fever or night sweats, weight loss  Respiratory ROS: no cough, shortness of breath, or wheezing  Cardiovascular ROS: no chest pain or dyspnea on exertion  Genito-Urinary ROS: no dysuria, trouble voiding, or hematuria  Musculoskeletal ROS: negative for - gait disturbance, joint pain or muscle pain  Neurological ROS: no TIA or stroke symptoms  GI ROS: see HPI  Skin ROS: no new rashes or lesions   Lymphatic ROS: no new adenopathy noted by pt  GYN ROS: see HPI, no new GYN history or bleeding noted  Psy ROS: no new mental or behavioral disturbances         Patient Active Problem List   Diagnosis    Myalgia    Hernia, inguinal, right    Hypertension    Hyperlipidemia    Prediabetes       Allergies:  Patient has no known allergies        Current Outpatient Medications:     amLODIPine (NORVASC) 5 mg tablet, Take 5 mg by mouth daily at bedtime , Disp: , Rfl:     Ascorbic Acid (vitamin C) 1000 MG tablet, Take 1,000 mg by mouth daily, Disp: , Rfl:     aspirin 81 mg chewable tablet, Chew 81 mg daily at bedtime , Disp: , Rfl:     Cholecalciferol (Vitamin D-3) 25 MCG (1000 UT) CAPS, Take 1 capsule by mouth, Disp: , Rfl:     diclofenac sodium (VOLTAREN) 1 %, Apply 2 g topically 4 (four) times a day, Disp: 1 Tube, Rfl: 3    diclofenac sodium (VOLTAREN) 50 mg EC tablet, Take 100 mg by mouth 2 (two) times a day, Disp: , Rfl:     finasteride (PROSCAR) 5 mg tablet, Take 2 5 mg by mouth daily at bedtime , Disp: , Rfl:     montelukast (SINGULAIR) 10 mg tablet, Take 10 mg by mouth as needed , Disp: , Rfl:     Multiple Vitamin (MULTIVITAMIN ADULT PO), Take 1 tablet by mouth daily, Disp: , Rfl:     rosuvastatin (CRESTOR) 20 MG tablet, Take 20 mg by mouth daily at bedtime , Disp: , Rfl:     Turmeric 400 MG CAPS, Take 1 capsule by mouth daily, Disp: , Rfl:     Past Medical History:   Diagnosis Date    Hypertension     Irregular heart beat     Lung nodule        Past Surgical History:   Procedure Laterality Date    BUNIONECTOMY Right     COLONOSCOPY      HAND SURGERY Right     cyst excision    AZ REPAIR ING HERNIA,5+Y/O,REDUCIBL Right 3/15/2021    Procedure: INGUINAL HERNIA REPAIR;  Surgeon: Cyndi Boudreaux DO;  [facility];  Service: General    SHOULDER SURGERY Right 2018       Family History   Problem Relation Age of Onset    Diabetes Mother     Heart attack Father         reports that he quit smoking about 42 years ago  He has never used smokeless tobacco  He reports current alcohol use of about 6 0 - 7 0 standard drinks of alcohol per week  He reports that he does not use drugs  PHYSICAL EXAM    There were no vitals taken for this visit      General: normal, cooperative, no distress  Abdominal: soft, nondistended or nontender  Incision: clean, dry, and intact and healing well      Cyndi Boudreaux DO    Date: 3/31/2021 Time: 9:21 AM

## 2021-05-05 ENCOUNTER — OFFICE VISIT (OUTPATIENT)
Dept: FAMILY MEDICINE CLINIC | Facility: OTHER | Age: 71
End: 2021-05-05
Payer: MEDICARE

## 2021-05-05 VITALS
HEART RATE: 55 BPM | TEMPERATURE: 97.8 F | SYSTOLIC BLOOD PRESSURE: 112 MMHG | RESPIRATION RATE: 18 BRPM | WEIGHT: 158.8 LBS | OXYGEN SATURATION: 98 % | DIASTOLIC BLOOD PRESSURE: 70 MMHG | HEIGHT: 68 IN | BODY MASS INDEX: 24.07 KG/M2

## 2021-05-05 DIAGNOSIS — M79.605 LEFT LEG PAIN: Primary | ICD-10-CM

## 2021-05-05 PROCEDURE — 99213 OFFICE O/P EST LOW 20 MIN: CPT | Performed by: FAMILY MEDICINE

## 2021-05-05 NOTE — PATIENT INSTRUCTIONS
Sciatica   WHAT YOU NEED TO KNOW:   What is sciatica? Sciatica is a condition that causes pain along your sciatic nerve  The sciatic nerve runs from your spine through both sides of your buttocks  It then runs down the back of your thigh, into your lower leg and foot  Any place along your sciatic nerve may be compressed, inflamed, irritated, or stretched and cause symptoms  What causes sciatica? Sciatica may be related to certain activities, poor posture, and physical or psychological stress  Any of the following may cause or increase your risk of sciatica:  · Disc problems:  A slipped disc (soft cushion in between the bones of the spine) is the most common cause of sciatica  The disc may press on the sciatic nerve  One bone in your spine may slip over another, or you may have narrowing of the spinal column  · Muscle injury: This may happen after you twist or lift a heavy object  Swelling from sprained or irritated muscles in the buttocks, thighs, or legs press on the sciatic nerve  · Obesity or pregnancy:  Extra weight increases pressure on your back and legs  · Trauma:  Direct blows on the buttocks, thighs, or legs, car accidents, or falls may injure the sciatic nerve  · Diseases of the spine:  Arthritis, osteoporosis, cancer, or infection of the spine may also affect the sciatic nerve  What are the signs and symptoms of sciatica? The symptoms of sciatic may be short-term or long-term:  · Pain that goes from the lower back into your buttocks and down the back of your thigh    · Numbness or tingling in your buttocks and legs    · Muscle weakness, difficulty moving or controlling your leg or foot    · Leg pain that increases with standing, sitting, or squatting    How is sciatica diagnosed? Your healthcare provider will ask about other health conditions you may have  He may ask you about your job, history of back pain, diseases, or surgeries you have had   He will examine you and move your legs to see what increases pain  You may also need any of the following:  · X-rays: This is a picture of the bones and tissues in your back, hip, thigh, or leg  This test may show other problems, such as fractures (broken bones)  · CT scan: This test is also called a CAT scan  An x-ray machine uses a computer to take pictures of your hips, thighs, and legs  The pictures may show your sciatic nerve, muscles, and blood vessels  You may be given a dye before the pictures are taken to help healthcare providers see the pictures better  Tell the healthcare provider if you have ever had an allergic reaction to contrast dye  · MRI:  This scan uses powerful magnets and a computer to take pictures of your hips, thighs, and legs  An MRI may show damaged nerves, muscles, bones, and blood vessels  You may be given dye to help the pictures show up better  Tell the healthcare provider if you have ever had an allergic reaction to contrast dye  Do not enter the MRI room with anything metal  Metal can cause serious injury  Tell the healthcare provider if you have any metal in or on your body  · An electromyography (EMG)  test measures the electrical activity of your muscles at rest and with movement  · Nerve conduction tests: These tests check how surface nerves and related muscles respond to stimulation  Electrodes with wires or tiny needles are placed on certain areas, such as the buttocks and legs  How is sciatica treated? · NSAIDs:  These medicines decrease swelling and pain  NSAIDs are available without a doctor's order  Ask your healthcare provider which medicine is right for you  Ask how much to take and when to take it  Take as directed  NSAIDs can cause stomach bleeding or kidney problems if not taken correctly  · Acetaminophen: This medicine decreases pain  Acetaminophen is available without a doctor's order  Ask how much to take and how often to take it  Follow directions   Acetaminophen can cause liver damage if not taken correctly  · Muscle relaxers  help decrease pain and muscle spasms  · Epidural steroid medicine: This may include both an anesthetic (numbing medicine) and a steroid, which may decrease swelling and relieve pain  It is given as a shot close to the spine in the area where you have pain  · Chemonucleolysis: This is an injection given into the damaged disc to soften or shrink the disc  · Surgery: This may be done to correct problems such as a damaged disc, or a tumor in your spine  It may be done to decrease the pressure on the sciatic nerve  Healthcare providers may also release the muscle that may be pressing into your sciatic nerve  How can I help manage sciatica? · Ultrasound therapy: This is a machine that uses sound waves to decrease pain  Topical medicines may be added to help decrease pain and inflammation  · Physical therapy:  A physical therapist teaches you exercises to help improve movement and strength, and to decrease pain  An occupational therapist teaches you skills to help with your daily activities  · Assistive devices: You may need to wear back support, such as a back brace  You may need crutches, a cane, or a walker to decrease stress on your lower back and leg muscles  Ask your healthcare provider for more information about assistive devices and how to use them correctly  How can sciatica be prevented? · Avoid pressure on your back and legs:  Do not  lift heavy objects, or stand or sit for long periods of time  · Lift objects safely:  Keep your back straight and bend your knees when you  an object  Do not bend or twist your back when you lift  · Maintain a healthy weight:  Ask your healthcare provider how much you should weigh  Ask him to help you create a weight loss plan if you are overweight  · Exercise:  Ask your healthcare provider about the best stretching, warmup, and exercise plan for you      What are the risks of sciatica? An epidural steroid injection can lead to pain disorders or paralysis if it is placed incorrectly  It may also cause headaches, leg pain, and blockage of blood flow to the spinal cord  Surgery may cause you to bleed or get an infection  If not treated, your muscles and nerves may become damaged permanently  You may have decreased strength  You may not be able to move your leg or control when you urinate or have bowel movements  When should I contact my healthcare provider? · You have pain in your lower back at night or when resting  · You have pain in your lower back with numbness below the knee  · You have weakness in one leg only  · You have questions or concerns about your condition or care  When should I seek immediate care or call 911? · You have trouble holding back your urine or bowel movements  · You have weakness in both legs  · You have numbness in your groin or buttocks  CARE AGREEMENT:   You have the right to help plan your care  Learn about your health condition and how it may be treated  Discuss treatment options with your healthcare providers to decide what care you want to receive  You always have the right to refuse treatment  The above information is an  only  It is not intended as medical advice for individual conditions or treatments  Talk to your doctor, nurse or pharmacist before following any medical regimen to see if it is safe and effective for you  © Copyright [address] Information is for End User's use only and may not be sold, redistributed or otherwise used for commercial purposes   All illustrations and images included in CareNotes® are the copyrighted property of [employer]  or [address] [place] [address]

## 2021-05-05 NOTE — PROGRESS NOTES
Assessment/Plan:    Left leg pain  Lumbar spinal stenosis/radiculopathy vs piriformis syndrome  · Recommended pt try Tylenol since Voltaren was ineffective  · Lumbar spinal x-ray       Diagnoses and all orders for this visit:    Left leg pain  -     XR spine lumbar minimum 4 views non injury; Future          Subjective:      Patient ID: Xuan Blum is a 80 y o  male  Pt presents for L thigh pain that began around 1 year ago  He reports that the pain worsens at the end of the day often while walking his dog and eventually gets so severe he cannot walk  He says the pain is sharp and seems to move between his buttock and anterior thigh, but he is unsure where it originates  He states that he was previously talking Voltaren, but he does not believe this was helping his pain so he stopped taking it  He does report improvement with sitting and believes it may have improved some since he started carrying his wallet in his left front pocket instead of his left rear pocket  He endorses occasional numbness/tingling in his lower leg/foot a couple times when the pain was very severe  The patient is unsure if he has any weakness  He denies pain currently as well as any back pain or urinary incontinence/retention  Review of Systems   Constitutional: Negative for appetite change, chills, fever and unexpected weight change  HENT: Negative for congestion, rhinorrhea and sore throat  Eyes: Negative for visual disturbance  Respiratory: Negative for cough and shortness of breath  Cardiovascular: Negative for chest pain and leg swelling  Gastrointestinal: Negative for abdominal pain, blood in stool, constipation, diarrhea, nausea and vomiting  Genitourinary: Negative for difficulty urinating, dysuria and hematuria  Musculoskeletal: Positive for myalgias  Negative for arthralgias  Skin: Negative for rash  Neurological: Positive for numbness (a couple times in L foot)   Negative for dizziness, light-headedness and headaches  All other systems reviewed and are negative  Objective:      /70   Pulse 55   Temp 97 8 °F (36 6 °C)   Resp 18   Ht 5' 8" (1 727 m)   Wt 72 kg (158 lb 12 8 oz)   SpO2 98%   BMI 24 15 kg/m²          Physical Exam  Vitals signs reviewed  Constitutional:       General: He is not in acute distress  Appearance: Normal appearance  He is well-developed  He is not diaphoretic  HENT:      Head: Normocephalic and atraumatic  Mouth/Throat:      Mouth: Mucous membranes are moist       Pharynx: Oropharynx is clear  Eyes:      Extraocular Movements: Extraocular movements intact  Conjunctiva/sclera: Conjunctivae normal       Pupils: Pupils are equal, round, and reactive to light  Neck:      Musculoskeletal: Neck supple  Cardiovascular:      Rate and Rhythm: Normal rate and regular rhythm  Pulses: Normal pulses  Heart sounds: Normal heart sounds  No murmur  No friction rub  No gallop  Pulmonary:      Effort: Pulmonary effort is normal  No respiratory distress  Breath sounds: Normal breath sounds  No stridor  No wheezing, rhonchi or rales  Abdominal:      General: Bowel sounds are normal  There is no distension  Palpations: Abdomen is soft  Tenderness: There is no abdominal tenderness  There is no right CVA tenderness, left CVA tenderness or guarding  Musculoskeletal: Normal range of motion  Right lower leg: No edema  Left lower leg: No edema  Comments: No vertebral TTP  No TTP over SI joint or greater trochanter   Pain not clearly reproducible with hip extension, JARON, FADIR  Skin:     General: Skin is warm and dry  Findings: No rash  Neurological:      General: No focal deficit present  Mental Status: He is alert and oriented to person, place, and time  Sensory: No sensory deficit  Motor: No weakness  Gait: Gait normal       Comments: Negative SLR     Psychiatric:         Mood and Affect: Mood normal          Behavior: Behavior normal

## 2021-05-06 PROBLEM — M79.10 MYALGIA: Status: RESOLVED | Noted: 2021-02-24 | Resolved: 2021-05-06

## 2021-05-06 PROBLEM — K40.90 HERNIA, INGUINAL, RIGHT: Status: RESOLVED | Noted: 2021-02-24 | Resolved: 2021-05-06

## 2021-05-06 PROBLEM — M79.605 LEFT LEG PAIN: Status: ACTIVE | Noted: 2021-05-06

## 2021-05-06 NOTE — ASSESSMENT & PLAN NOTE
Lumbar spinal stenosis/radiculopathy vs piriformis syndrome  · Recommended pt try Tylenol since Voltaren was ineffective  · Lumbar spinal x-ray

## 2021-05-13 ENCOUNTER — HOSPITAL ENCOUNTER (OUTPATIENT)
Dept: RADIOLOGY | Facility: HOSPITAL | Age: 71
Discharge: HOME | End: 2021-05-13
Payer: MEDICARE

## 2021-05-13 DIAGNOSIS — M79.605 LEFT LEG PAIN: ICD-10-CM

## 2021-05-13 PROCEDURE — 72110 X-RAY EXAM L-2 SPINE 4/>VWS: CPT

## 2021-05-23 NOTE — RESULT ENCOUNTER NOTE
Pt notified of results  Discussed possible benefit of ordering MRI but pt would like to wait until he sees an orthopedist that was recommended to him (Dr Serene Hicks)  He will keep me informed

## 2021-06-16 ENCOUNTER — OFFICE VISIT (OUTPATIENT)
Dept: FAMILY MEDICINE CLINIC | Facility: OTHER | Age: 71
End: 2021-06-16
Payer: MEDICARE

## 2021-06-16 VITALS
OXYGEN SATURATION: 98 % | WEIGHT: 156 LBS | SYSTOLIC BLOOD PRESSURE: 122 MMHG | TEMPERATURE: 97.8 F | BODY MASS INDEX: 23.64 KG/M2 | HEIGHT: 68 IN | DIASTOLIC BLOOD PRESSURE: 72 MMHG | HEART RATE: 69 BPM | RESPIRATION RATE: 18 BRPM

## 2021-06-16 DIAGNOSIS — L91.8 SKIN TAG: ICD-10-CM

## 2021-06-16 DIAGNOSIS — E78.5 HYPERLIPIDEMIA, UNSPECIFIED HYPERLIPIDEMIA TYPE: ICD-10-CM

## 2021-06-16 DIAGNOSIS — Z00.00 MEDICARE ANNUAL WELLNESS VISIT, SUBSEQUENT: Primary | ICD-10-CM

## 2021-06-16 DIAGNOSIS — Z23 NEED FOR SHINGLES VACCINE: ICD-10-CM

## 2021-06-16 DIAGNOSIS — R73.03 PREDIABETES: ICD-10-CM

## 2021-06-16 DIAGNOSIS — I10 HYPERTENSION, UNSPECIFIED TYPE: ICD-10-CM

## 2021-06-16 DIAGNOSIS — Z11.4 SCREENING FOR HIV (HUMAN IMMUNODEFICIENCY VIRUS): ICD-10-CM

## 2021-06-16 LAB — SL AMB POCT HEMOGLOBIN AIC: 6.1 (ref ?–6.5)

## 2021-06-16 PROCEDURE — 90471 IMMUNIZATION ADMIN: CPT

## 2021-06-16 PROCEDURE — 1123F ACP DISCUSS/DSCN MKR DOCD: CPT | Performed by: FAMILY MEDICINE

## 2021-06-16 PROCEDURE — 90750 HZV VACC RECOMBINANT IM: CPT

## 2021-06-16 PROCEDURE — G0402 INITIAL PREVENTIVE EXAM: HCPCS | Performed by: FAMILY MEDICINE

## 2021-06-16 PROCEDURE — 83036 HEMOGLOBIN GLYCOSYLATED A1C: CPT | Performed by: FAMILY MEDICINE

## 2021-06-16 RX ORDER — ZOSTER VACCINE RECOMBINANT, ADJUVANTED 50 MCG/0.5
0.5 KIT INTRAMUSCULAR ONCE
Qty: 1 EACH | Refills: 1 | Status: SHIPPED | OUTPATIENT
Start: 2021-06-16 | End: 2021-06-16 | Stop reason: CLARIF

## 2021-06-16 NOTE — PATIENT INSTRUCTIONS
Medicare Preventive Visit Patient Instructions  Thank you for completing your Welcome to Medicare Visit or Medicare Annual Wellness Visit today  Your next wellness visit will be due in one year (6/17/2022)  The screening/preventive services that you may require over the next 5-10 years are detailed below  Some tests may not apply to you based off risk factors and/or age  Screening tests ordered at today's visit but not completed yet may show as past due  Also, please note that scanned in results may not display below  Preventive Screenings:  Service Recommendations Previous Testing/Comments   Colorectal Cancer Screening  · Colonoscopy    · Fecal Occult Blood Test (FOBT)/Fecal Immunochemical Test (FIT)  · Fecal DNA/Cologuard Test  · Flexible Sigmoidoscopy Age: 54-65 years old   Colonoscopy: every 10 years (May be performed more frequently if at higher risk)  OR  FOBT/FIT: every 1 year  OR  Cologuard: every 3 years  OR  Sigmoidoscopy: every 5 years  Screening may be recommended earlier than age 48 if at higher risk for colorectal cancer  Also, an individualized decision between you and your healthcare provider will decide whether screening between the ages of 74-80 would be appropriate   Colonoscopy: 08/22/2018  FOBT/FIT: Not on file  Cologuard: Not on file  Sigmoidoscopy: Not on file    Screening Current     Prostate Cancer Screening Individualized decision between patient and health care provider in men between ages of 53-78   Medicare will cover every 12 months beginning on the day after your 50th birthday PSA: 0 3 ng/mL     Screening Current     Hepatitis C Screening Once for adults born between 1945 and 1965  More frequently in patients at high risk for Hepatitis C Hep C Antibody: 02/25/2021    Screening Current   Diabetes Screening 1-2 times per year if you're at risk for diabetes or have pre-diabetes Fasting glucose: 115 mg/dL   A1C: No results in last 5 years    Screening Current   Cholesterol Screening Once every 5 years if you don't have a lipid disorder  May order more often based on risk factors  Lipid panel: 02/25/2021    Screening Not Indicated  History Lipid Disorder      Other Preventive Screenings Covered by Medicare:  1  Abdominal Aortic Aneurysm (AAA) Screening: covered once if your at risk  You're considered to be at risk if you have a family history of AAA or a male between the age of 73-68 who smoking at least 100 cigarettes in your lifetime  2  Lung Cancer Screening: covers low dose CT scan once per year if you meet all of the following conditions: (1) Age 50-69; (2) No signs or symptoms of lung cancer; (3) Current smoker or have quit smoking within the last 15 years; (4) You have a tobacco smoking history of at least 30 pack years (packs per day x number of years you smoked); (5) You get a written order from a healthcare provider  3  Glaucoma Screening: covered annually if you're considered high risk: (1) You have diabetes OR (2) Family history of glaucoma OR (3)  aged 48 and older OR (3)  American aged 72 and older  3  Osteoporosis Screening: covered every 2 years if you meet one of the following conditions: (1) Have a vertebral abnormality; (2) On glucocorticoid therapy for more than 3 months; (3) Have primary hyperparathyroidism; (4) On osteoporosis medications and need to assess response to drug therapy  5  HIV Screening: covered annually if you're between the age of 12-76  Also covered annually if you are younger than 13 and older than 72 with risk factors for HIV infection  For pregnant patients, it is covered up to 3 times per pregnancy      Immunizations:  Immunization Recommendations   Influenza Vaccine Annual influenza vaccination during flu season is recommended for all persons aged >= 6 months who do not have contraindications   Pneumococcal Vaccine (Prevnar and Pneumovax)  * Prevnar = PCV13  * Pneumovax = PPSV23 Adults 25-60 years old: 1-3 doses may be recommended based on certain risk factors  Adults 72 years old: Prevnar (PCV13) vaccine recommended followed by Pneumovax (PPSV23) vaccine  If already received PPSV23 since turning 65, then PCV13 recommended at least one year after PPSV23 dose  Hepatitis B Vaccine 3 dose series if at intermediate or high risk (ex: diabetes, end stage renal disease, liver disease)   Tetanus (Td) Vaccine - COST NOT COVERED BY MEDICARE PART B Following completion of primary series, a booster dose should be given every 10 years to maintain immunity against tetanus  Td may also be given as tetanus wound prophylaxis  Tdap Vaccine - COST NOT COVERED BY MEDICARE PART B Recommended at least once for all adults  For pregnant patients, recommended with each pregnancy  Shingles Vaccine (Shingrix) - COST NOT COVERED BY MEDICARE PART B  2 shot series recommended in those aged 48 and above     Health Maintenance Due:      Topic Date Due    Colorectal Cancer Screening  08/22/2028    Hepatitis C Screening  Completed     Immunizations Due:      Topic Date Due    Influenza Vaccine (Season Ended) 09/01/2021     Advance Directives   What are advance directives? Advance directives are legal documents that state your wishes and plans for medical care  These plans are made ahead of time in case you lose your ability to make decisions for yourself  Advance directives can apply to any medical decision, such as the treatments you want, and if you want to donate organs  What are the types of advance directives? There are many types of advance directives, and each state has rules about how to use them  You may choose a combination of any of the following:  · Living will: This is a written record of the treatment you want  You can also choose which treatments you do not want, which to limit, and which to stop at a certain time  This includes surgery, medicine, IV fluid, and tube feedings  · Durable power of  for healthcare [facility]):   This is a written record that states who you want to make healthcare choices for you when you are unable to make them for yourself  This person, called a proxy, is usually a family member or a friend  You may choose more than 1 proxy  · Do not resuscitate (DNR) order:  A DNR order is used in case your heart stops beating or you stop breathing  It is a request not to have certain forms of treatment, such as CPR  A DNR order may be included in other types of advance directives  · Medical directive: This covers the care that you want if you are in a coma, near death, or unable to make decisions for yourself  You can list the treatments you want for each condition  Treatment may include pain medicine, surgery, blood transfusions, dialysis, IV or tube feedings, and a ventilator (breathing machine)  · Values history: This document has questions about your views, beliefs, and how you feel and think about life  This information can help others choose the care that you would choose  Why are advance directives important? An advance directive helps you control your care  Although spoken wishes may be used, it is better to have your wishes written down  Spoken wishes can be misunderstood, or not followed  Treatments may be given even if you do not want them  An advance directive may make it easier for your family to make difficult choices about your care  © Copyright [employer] 2018 Information is for End User's use only and may not be sold, redistributed or otherwise used for commercial purposes   All illustrations and images included in CareNotes® are the copyrighted property of [employer]  or [address] [place] [address] [email or web address]

## 2021-06-16 NOTE — PROGRESS NOTES
Assessment and Plan:     Problem List Items Addressed This Visit        Cardiovascular and Mediastinum    Hypertension    Relevant Orders    Comprehensive metabolic panel       Other    Hyperlipidemia    Relevant Orders    Lipid Panel with Direct LDL reflex    Prediabetes    Relevant Orders    Comprehensive metabolic panel    POCT hemoglobin A1c (Completed)      Other Visit Diagnoses     Medicare annual wellness visit, subsequent    -  Primary    Skin tag        Relevant Medications    Diclofenac Sodium (VOLTAREN) 1 %    Other Relevant Orders    Ambulatory referral to Dermatology    Need for shingles vaccine        Relevant Orders    Zoster Vaccine Recombinant IM (Completed)    Screening for HIV (human immunodeficiency virus)        Relevant Orders    HIV 1/2 ANTIGEN/ANTIBODY (4TH GENERATION) W REFLEX [facility]           Preventive health issues were discussed with patient, and age appropriate screening tests were ordered as noted in patient's After Visit Summary  Personalized health advice and appropriate referrals for health education or preventive services given if needed, as noted in patient's After Visit Summary  History of Present Illness:     Patient presents for Medicare Annual Wellness visit  He would like a referral to dermatology for skin tag removal as well as to have a full skin evaluation      Patient Care Team:  Stephen Contreras MD as PCP - General (Family Medicine)     Problem List:     Patient Active Problem List   Diagnosis    Hypertension    Hyperlipidemia    Prediabetes    Left leg pain      Past Medical and Surgical History:     Past Medical History:   Diagnosis Date    Hernia, inguinal, right 2/24/2021    Hypertension     Irregular heart beat     Lung nodule      Past Surgical History:   Procedure Laterality Date    BUNIONECTOMY Right     COLONOSCOPY      HAND SURGERY Right     cyst excision    PA REPAIR ING HERNIA,5+Y/O,REDUCIBL Right 3/15/2021    Procedure: INGUINAL HERNIA REPAIR; Surgeon: Leanna Sanders DO;  [facility];  Service: General    SHOULDER SURGERY Right 2018      Family History:     Family History   Problem Relation Age of Onset    Diabetes Mother     Heart attack Father       Social History:     Social History     Socioeconomic History    Marital status:      Spouse name: None    Number of children: None    Years of education: None    Highest education level: None   Occupational History    None   Tobacco Use    Smoking status: Former Smoker     Quit date:      Years since quittin 4    Smokeless tobacco: Never Used   Vaping Use    Vaping Use: Never used   Substance and Sexual Activity    Alcohol use: Yes     Alcohol/week: 6 0 - 7 0 standard drinks     Types: 6 - 7 Glasses of wine per week    Drug use: Never    Sexual activity: Yes     Partners: Female   Other Topics Concern    None   Social History Narrative    · Do you currently or have you served in [employer]: Yes      · If Yes, What branch of service:   [employer]      · Were you activated, into active duty, as a member of the [employer] or as a Reservist:   No      Social Determinants of Health     Financial Resource Strain:     Difficulty of Paying Living Expenses:    Food Insecurity:     Worried About [address] [place] in the Last Year:     [address] in the Last Year:    Transportation Needs:     Lack of Transportation (Medical):      Lack of Transportation (Non-Medical):    Physical Activity:     Days of Exercise per Week:     Minutes of Exercise per Session:    Stress:     Feeling of Stress :    Social Connections:     Frequency of Communication with Friends and Family:     Frequency of Social Gatherings with Friends and Family:     Attends Christian Services:     Active Member of Clubs or Organizations:     Attends Club or Organization Meetings:     Marital Status:    Intimate Partner Violence:     Fear of Current or Ex-Partner:     Emotionally Abused:     Physically Abused:     Sexually Abused:       Medications and Allergies:     Current Outpatient Medications   Medication Sig Dispense Refill    amLODIPine (NORVASC) 5 mg tablet Take 5 mg by mouth daily at bedtime       Ascorbic Acid (vitamin C) 1000 MG tablet Take 1,000 mg by mouth daily      aspirin 81 mg chewable tablet Chew 81 mg daily at bedtime       Cholecalciferol (Vitamin D-3) 25 MCG (1000 UT) CAPS Take 1 capsule by mouth      diclofenac sodium (VOLTAREN) 1 % Apply 2 g topically 4 (four) times a day 1 Tube 3    diclofenac sodium (VOLTAREN) 50 mg EC tablet Take 100 mg by mouth 2 (two) times a day      finasteride (PROSCAR) 5 mg tablet Take 2 5 mg by mouth daily at bedtime       montelukast (SINGULAIR) 10 mg tablet Take 10 mg by mouth as needed       Multiple Vitamin (MULTIVITAMIN ADULT PO) Take 1 tablet by mouth daily      rosuvastatin (CRESTOR) 20 MG tablet Take 20 mg by mouth daily at bedtime       Turmeric 400 MG CAPS Take 1 capsule by mouth daily      Diclofenac Sodium (VOLTAREN) 1 % APPLY 2 GRAMS EXTERNALLY TO THE AFFECTED AREA FOUR TIMES DAILY       No current facility-administered medications for this visit       No Known Allergies   Immunizations:     Immunization History   Administered Date(s) Administered    DTP 03/08/2012    Hep A, adult 10/13/2001    INFLUENZA 03/08/2012, 10/20/2014, 10/01/2016    Influenza Split High Dose Preservative Free IM 11/02/2017    Influenza Whole 10/18/2003    OPV 12/01/1970    Pneumococcal Conjugate 13-Valent 07/14/2016    Pneumococcal Polysaccharide PPV23 11/02/2017    SARS-CoV-2 / COVID-19 mRNA IM (Pfizer-BioNTech) 03/07/2021, 03/28/2021    Td (adult), Unspecified 03/08/2012    Td (adult), adsorbed 12/11/1999    Typhoid, Unspecified 04/01/2000, 02/01/2003    Yellow Fever 10/13/2001    Zoster 04/09/2012    Zoster Vaccine Recombinant 06/16/2021    influenza, trivalent, adjuvanted 11/06/2018      Health Maintenance: Topic Date Due    Colorectal Cancer Screening  08/22/2028    Hepatitis C Screening  Completed         Topic Date Due    Influenza Vaccine (Season Ended) 09/01/2021      Medicare Health Risk Assessment:     /72   Pulse 69   Temp 97 8 °F (36 6 °C)   Resp 18   Ht 5' 8" (1 727 m)   Wt 70 8 kg (156 lb)   SpO2 98%   BMI 23 72 kg/m²      Latanya Beckman is here for his Subsequent Wellness visit  Last Medicare Wellness visit information reviewed, patient interviewed and updates made to the record today  Health Risk Assessment:   Patient rates overall health as good  Patient feels that their physical health rating is same  Patient is satisfied with their life  Eyesight was rated as same  Hearing was rated as slightly worse  Patient feels that their emotional and mental health rating is same  Patients states they are never, rarely angry  Patient states they are never, rarely unusually tired/fatigued  Pain experienced in the last 7 days has been some  Patient's pain rating has been 6/10  Patient states that he has experienced no weight loss or gain in last 6 months  Fall Risk Screening: In the past year, patient has experienced: no history of falling in past year      Home Safety:  Patient does not have trouble with stairs inside or outside of their home  Patient has working smoke alarms and has working carbon monoxide detector  Home safety hazards include: none  Nutrition:   Current diet is Regular, Low Cholesterol, Low Saturated Fat and No Added Salt  Medications:   Patient is not currently taking any over-the-counter supplements  Patient is able to manage medications  Activities of Daily Living (ADLs)/Instrumental Activities of Daily Living (IADLs):   Walk and transfer into and out of bed and chair?: Yes  Dress and groom yourself?: Yes    Bathe or shower yourself?: Yes    Feed yourself?  Yes  Do your laundry/housekeeping?: Yes  Manage your money, pay your bills and track your expenses?: Yes  Make your own meals?: Yes    Do your own shopping?: Yes    Durable Medical Equipment Suppliers  N/A    Previous Hospitalizations:   Any hospitalizations or ED visits within the last 12 months?: No      Hospitalization Comments: Had Hernia surgery Mar  2021    Advance Care Planning:   Living will: Yes    Durable POA for healthcare: Yes    Advanced directive: Yes      Cognitive Screening:   Provider or family/friend/caregiver concerned regarding cognition?: No    PREVENTIVE SCREENINGS      Cardiovascular Screening:    General: History Lipid Disorder    Due for: Lipid Panel      Diabetes Screening:     General: Screening Current      Colorectal Cancer Screening:     General: Screening Current      Prostate Cancer Screening:    General: Screening Current      Osteoporosis Screening:    General: Screening Not Indicated      Abdominal Aortic Aneurysm (AAA) Screening:    Risk factors include: age between 73-67 yo and tobacco use        General: Screening Current      Lung Cancer Screening:     General: Screening Not Indicated      Hepatitis C Screening:    General: Screening Current    Screening, Brief Intervention, and Referral to Treatment (SBIRT)    Screening  Typical number of drinks in a day: 2  Typical number of drinks in a week: 14  Interpretation: Low risk drinking behavior  Single Item Drug Screening:  How often have you used an illegal drug (including marijuana) or a prescription medication for non-medical reasons in the past year? never    Single Item Drug Screen Score: 0  Interpretation: Negative screen for possible drug use disorder    Brief Intervention  Healthy alcohol use/limits discussed  Time Spent  Time spent providing alcohol/substance abuse assessment and intervention services: 3 minutes    Physical Exam  Vitals reviewed  Constitutional:       General: He is not in acute distress  Appearance: Normal appearance  He is well-developed  He is not diaphoretic     HENT:      Head: Normocephalic and atraumatic  Right Ear: External ear normal       Left Ear: External ear normal       Nose: Nose normal       Mouth/Throat:      Mouth: Mucous membranes are moist       Pharynx: Oropharynx is clear  Eyes:      Extraocular Movements: Extraocular movements intact  Conjunctiva/sclera: Conjunctivae normal       Pupils: Pupils are equal, round, and reactive to light  Cardiovascular:      Rate and Rhythm: Normal rate and regular rhythm  Pulses: Normal pulses  Heart sounds: Normal heart sounds  No murmur heard  No friction rub  No gallop  Pulmonary:      Effort: Pulmonary effort is normal  No respiratory distress  Breath sounds: Normal breath sounds  No stridor  No wheezing, rhonchi or rales  Abdominal:      General: Bowel sounds are normal  There is no distension  Palpations: Abdomen is soft  There is no mass  Tenderness: There is no abdominal tenderness  There is no right CVA tenderness, left CVA tenderness or guarding  Musculoskeletal:         General: Normal range of motion  Cervical back: Normal range of motion and neck supple  Right lower leg: No edema  Left lower leg: No edema  Lymphadenopathy:      Cervical: No cervical adenopathy  Skin:     General: Skin is warm and dry  Findings: No rash  Neurological:      General: No focal deficit present  Mental Status: He is alert and oriented to person, place, and time     Psychiatric:         Mood and Affect: Mood normal          Behavior: Behavior normal            Stephen Contreras MD

## 2021-08-24 DIAGNOSIS — E78.5 HYPERLIPIDEMIA, UNSPECIFIED HYPERLIPIDEMIA TYPE: ICD-10-CM

## 2021-08-24 DIAGNOSIS — I10 HYPERTENSION, UNSPECIFIED TYPE: Primary | ICD-10-CM

## 2021-08-24 DIAGNOSIS — N40.0 BENIGN PROSTATIC HYPERPLASIA, UNSPECIFIED WHETHER LOWER URINARY TRACT SYMPTOMS PRESENT: ICD-10-CM

## 2021-08-24 DIAGNOSIS — M19.90 ARTHRITIS: ICD-10-CM

## 2021-08-24 NOTE — TELEPHONE ENCOUNTER
Patient would like to have 90 days of meds     Thank you     Patient also did send a message request thru joset

## 2021-08-25 RX ORDER — ROSUVASTATIN CALCIUM 20 MG/1
20 TABLET, COATED ORAL
Qty: 90 TABLET | Refills: 0 | Status: SHIPPED | OUTPATIENT
Start: 2021-08-25 | End: 2021-12-17 | Stop reason: SDUPTHER

## 2021-08-25 RX ORDER — AMLODIPINE BESYLATE 5 MG/1
5 TABLET ORAL
Qty: 90 TABLET | Refills: 0 | Status: SHIPPED | OUTPATIENT
Start: 2021-08-25 | End: 2021-12-17 | Stop reason: SDUPTHER

## 2021-08-25 RX ORDER — FINASTERIDE 5 MG/1
2.5 TABLET, FILM COATED ORAL
Qty: 45 TABLET | Refills: 0 | Status: SHIPPED | OUTPATIENT
Start: 2021-08-25 | End: 2021-12-17 | Stop reason: SDUPTHER

## 2021-11-06 ENCOUNTER — IMMUNIZATIONS (OUTPATIENT)
Dept: FAMILY MEDICINE CLINIC | Facility: HOSPITAL | Age: 71
End: 2021-11-06

## 2021-11-06 DIAGNOSIS — Z23 ENCOUNTER FOR IMMUNIZATION: Primary | ICD-10-CM

## 2021-11-06 PROCEDURE — 0001A COVID-19 PFIZER VACC 0.3 ML: CPT

## 2021-11-06 PROCEDURE — 91300 COVID-19 PFIZER VACC 0.3 ML: CPT

## 2021-12-06 ENCOUNTER — LAB (OUTPATIENT)
Dept: LAB | Facility: AMBULARY SURGERY CENTER | Age: 71
End: 2021-12-06
Payer: MEDICARE

## 2021-12-06 DIAGNOSIS — Z11.4 SCREENING FOR HIV (HUMAN IMMUNODEFICIENCY VIRUS): ICD-10-CM

## 2021-12-06 DIAGNOSIS — I10 HYPERTENSION, UNSPECIFIED TYPE: ICD-10-CM

## 2021-12-06 DIAGNOSIS — R73.03 PREDIABETES: ICD-10-CM

## 2021-12-06 DIAGNOSIS — E78.5 HYPERLIPIDEMIA, UNSPECIFIED HYPERLIPIDEMIA TYPE: ICD-10-CM

## 2021-12-06 LAB
ALBUMIN SERPL BCP-MCNC: 4 G/DL (ref 3.5–5)
ALP SERPL-CCNC: 55 U/L (ref 46–116)
ALT SERPL W P-5'-P-CCNC: 23 U/L (ref 12–78)
ANION GAP SERPL CALCULATED.3IONS-SCNC: 4 MMOL/L (ref 4–13)
AST SERPL W P-5'-P-CCNC: 15 U/L (ref 5–45)
BILIRUB SERPL-MCNC: 0.65 MG/DL (ref 0.2–1)
BUN SERPL-MCNC: 22 MG/DL (ref 5–25)
CALCIUM SERPL-MCNC: 9.5 MG/DL (ref 8.3–10.1)
CHLORIDE SERPL-SCNC: 107 MMOL/L (ref 100–108)
CHOLEST SERPL-MCNC: 172 MG/DL
CO2 SERPL-SCNC: 27 MMOL/L (ref 21–32)
CREAT SERPL-MCNC: 1.07 MG/DL (ref 0.6–1.3)
GFR SERPL CREATININE-BSD FRML MDRD: 70 ML/MIN/1.73SQ M
GLUCOSE P FAST SERPL-MCNC: 125 MG/DL (ref 65–99)
HDLC SERPL-MCNC: 54 MG/DL
LDLC SERPL CALC-MCNC: 101 MG/DL (ref 0–100)
POTASSIUM SERPL-SCNC: 4.2 MMOL/L (ref 3.5–5.3)
PROT SERPL-MCNC: 7.1 G/DL (ref 6.4–8.2)
SODIUM SERPL-SCNC: 138 MMOL/L (ref 136–145)
TRIGL SERPL-MCNC: 86 MG/DL

## 2021-12-06 PROCEDURE — 80053 COMPREHEN METABOLIC PANEL: CPT

## 2021-12-06 PROCEDURE — 36415 COLL VENOUS BLD VENIPUNCTURE: CPT

## 2021-12-06 PROCEDURE — 87389 HIV-1 AG W/HIV-1&-2 AB AG IA: CPT

## 2021-12-06 PROCEDURE — 80061 LIPID PANEL: CPT

## 2021-12-07 LAB — HIV 1+2 AB+HIV1 P24 AG SERPL QL IA: NORMAL

## 2021-12-17 ENCOUNTER — OFFICE VISIT (OUTPATIENT)
Dept: FAMILY MEDICINE CLINIC | Facility: OTHER | Age: 71
End: 2021-12-17
Payer: MEDICARE

## 2021-12-17 VITALS
OXYGEN SATURATION: 98 % | HEIGHT: 68 IN | SYSTOLIC BLOOD PRESSURE: 122 MMHG | HEART RATE: 77 BPM | RESPIRATION RATE: 18 BRPM | WEIGHT: 153.4 LBS | TEMPERATURE: 98 F | DIASTOLIC BLOOD PRESSURE: 70 MMHG | BODY MASS INDEX: 23.25 KG/M2

## 2021-12-17 DIAGNOSIS — N40.0 BENIGN PROSTATIC HYPERPLASIA, UNSPECIFIED WHETHER LOWER URINARY TRACT SYMPTOMS PRESENT: ICD-10-CM

## 2021-12-17 DIAGNOSIS — Z77.090 ASBESTOS EXPOSURE: ICD-10-CM

## 2021-12-17 DIAGNOSIS — I10 BENIGN ESSENTIAL HYPERTENSION: ICD-10-CM

## 2021-12-17 DIAGNOSIS — Z23 ENCOUNTER FOR IMMUNIZATION: ICD-10-CM

## 2021-12-17 DIAGNOSIS — Z87.891 HISTORY OF SMOKING: ICD-10-CM

## 2021-12-17 DIAGNOSIS — R91.8 LUNG MASS: ICD-10-CM

## 2021-12-17 DIAGNOSIS — R73.03 PREDIABETES: ICD-10-CM

## 2021-12-17 DIAGNOSIS — E78.5 HYPERLIPIDEMIA, UNSPECIFIED HYPERLIPIDEMIA TYPE: ICD-10-CM

## 2021-12-17 DIAGNOSIS — G45.9 TIA (TRANSIENT ISCHEMIC ATTACK): Primary | ICD-10-CM

## 2021-12-17 PROBLEM — M51.16 LUMBAR DISC HERNIATION WITH RADICULOPATHY: Status: ACTIVE | Noted: 2021-06-07

## 2021-12-17 PROBLEM — M43.16 SPONDYLOLISTHESIS OF LUMBAR REGION: Status: ACTIVE | Noted: 2021-06-07

## 2021-12-17 PROBLEM — H69.80 DYSFUNCTION OF EUSTACHIAN TUBE: Status: ACTIVE | Noted: 2021-12-17

## 2021-12-17 PROBLEM — R09.82 ALLERGIC RHINITIS WITH POSTNASAL DRIP: Status: ACTIVE | Noted: 2018-06-25

## 2021-12-17 PROBLEM — J30.9 ALLERGIC RHINITIS WITH POSTNASAL DRIP: Status: ACTIVE | Noted: 2018-06-25

## 2021-12-17 PROBLEM — H69.90 DYSFUNCTION OF EUSTACHIAN TUBE: Status: ACTIVE | Noted: 2021-12-17

## 2021-12-17 PROBLEM — H90.2 CONDUCTIVE HEARING LOSS: Status: ACTIVE | Noted: 2021-12-17

## 2021-12-17 PROCEDURE — 90750 HZV VACC RECOMBINANT IM: CPT

## 2021-12-17 PROCEDURE — 99213 OFFICE O/P EST LOW 20 MIN: CPT | Performed by: FAMILY MEDICINE

## 2021-12-17 PROCEDURE — 90471 IMMUNIZATION ADMIN: CPT

## 2021-12-17 RX ORDER — GABAPENTIN 100 MG/1
100 CAPSULE ORAL AS NEEDED
COMMUNITY
Start: 2021-11-22 | End: 2022-08-04

## 2021-12-17 RX ORDER — FINASTERIDE 5 MG/1
2.5 TABLET, FILM COATED ORAL
Qty: 45 TABLET | Refills: 1 | Status: SHIPPED | OUTPATIENT
Start: 2021-12-17 | End: 2022-06-08

## 2021-12-17 RX ORDER — ROSUVASTATIN CALCIUM 20 MG/1
40 TABLET, COATED ORAL
Qty: 180 TABLET | Refills: 0 | Status: SHIPPED | OUTPATIENT
Start: 2021-12-17 | End: 2022-03-15 | Stop reason: SDUPTHER

## 2021-12-17 RX ORDER — AMLODIPINE BESYLATE 5 MG/1
5 TABLET ORAL
Qty: 90 TABLET | Refills: 1 | Status: SHIPPED | OUTPATIENT
Start: 2021-12-17 | End: 2022-06-08

## 2021-12-17 RX ORDER — OFLOXACIN 3 MG/ML
SOLUTION AURICULAR (OTIC)
COMMUNITY
Start: 2021-09-11 | End: 2022-08-04

## 2022-01-27 ENCOUNTER — TELEPHONE (OUTPATIENT)
Dept: FAMILY MEDICINE CLINIC | Facility: OTHER | Age: 72
End: 2022-01-27

## 2022-01-27 DIAGNOSIS — R91.8 LUNG MASS: ICD-10-CM

## 2022-01-27 DIAGNOSIS — Z87.891 HISTORY OF SMOKING: ICD-10-CM

## 2022-01-27 DIAGNOSIS — Z77.090 ASBESTOS EXPOSURE: Primary | ICD-10-CM

## 2022-01-27 NOTE — TELEPHONE ENCOUNTER
Patient called and said he needs a chest Xray order  per pulmonary (he has an appointment with them on 3/15/2022)     Please advise     Thank you

## 2022-01-28 ENCOUNTER — HOSPITAL ENCOUNTER (OUTPATIENT)
Dept: VASCULAR ULTRASOUND | Facility: HOSPITAL | Age: 72
Discharge: HOME | End: 2022-01-28
Payer: MEDICARE

## 2022-01-28 ENCOUNTER — HOSPITAL ENCOUNTER (OUTPATIENT)
Dept: MRI IMAGING | Facility: HOSPITAL | Age: 72
Discharge: HOME | End: 2022-01-28
Payer: MEDICARE

## 2022-01-28 ENCOUNTER — HOSPITAL ENCOUNTER (OUTPATIENT)
Dept: RADIOLOGY | Facility: HOSPITAL | Age: 72
Discharge: HOME | End: 2022-01-28
Payer: MEDICARE

## 2022-01-28 DIAGNOSIS — Z77.090 ASBESTOS EXPOSURE: ICD-10-CM

## 2022-01-28 DIAGNOSIS — R91.8 LUNG MASS: ICD-10-CM

## 2022-01-28 DIAGNOSIS — G45.9 TIA (TRANSIENT ISCHEMIC ATTACK): ICD-10-CM

## 2022-01-28 DIAGNOSIS — Z87.891 HISTORY OF SMOKING: ICD-10-CM

## 2022-01-28 PROCEDURE — A9585 GADOBUTROL INJECTION: HCPCS | Performed by: FAMILY MEDICINE

## 2022-01-28 PROCEDURE — 93880 EXTRACRANIAL BILAT STUDY: CPT

## 2022-01-28 PROCEDURE — 71046 X-RAY EXAM CHEST 2 VIEWS: CPT

## 2022-01-28 PROCEDURE — 70553 MRI BRAIN STEM W/O & W/DYE: CPT

## 2022-01-28 PROCEDURE — G1004 CDSM NDSC: HCPCS

## 2022-01-28 PROCEDURE — 93880 EXTRACRANIAL BILAT STUDY: CPT | Performed by: SURGERY

## 2022-01-28 RX ADMIN — GADOBUTROL 7 ML: 604.72 INJECTION INTRAVENOUS at 10:59

## 2022-02-07 NOTE — RESULT ENCOUNTER NOTE
3 small nodules in right upper lung as well as R pleural plaque which correspond with previous chest CT

## 2022-03-01 ENCOUNTER — LAB (OUTPATIENT)
Dept: LAB | Facility: AMBULARY SURGERY CENTER | Age: 72
End: 2022-03-01
Payer: MEDICARE

## 2022-03-01 DIAGNOSIS — E78.5 HYPERLIPIDEMIA, UNSPECIFIED HYPERLIPIDEMIA TYPE: ICD-10-CM

## 2022-03-01 DIAGNOSIS — I10 BENIGN ESSENTIAL HYPERTENSION: ICD-10-CM

## 2022-03-01 DIAGNOSIS — R73.03 PREDIABETES: ICD-10-CM

## 2022-03-01 LAB
ALBUMIN SERPL BCP-MCNC: 4 G/DL (ref 3.5–5)
ALP SERPL-CCNC: 57 U/L (ref 46–116)
ALT SERPL W P-5'-P-CCNC: 31 U/L (ref 12–78)
ANION GAP SERPL CALCULATED.3IONS-SCNC: 6 MMOL/L (ref 4–13)
AST SERPL W P-5'-P-CCNC: 21 U/L (ref 5–45)
BILIRUB SERPL-MCNC: 0.49 MG/DL (ref 0.2–1)
BUN SERPL-MCNC: 19 MG/DL (ref 5–25)
CALCIUM SERPL-MCNC: 9.6 MG/DL (ref 8.3–10.1)
CHLORIDE SERPL-SCNC: 108 MMOL/L (ref 100–108)
CHOLEST SERPL-MCNC: 174 MG/DL
CO2 SERPL-SCNC: 27 MMOL/L (ref 21–32)
CREAT SERPL-MCNC: 1.15 MG/DL (ref 0.6–1.3)
EST. AVERAGE GLUCOSE BLD GHB EST-MCNC: 131 MG/DL
GFR SERPL CREATININE-BSD FRML MDRD: 63 ML/MIN/1.73SQ M
GLUCOSE P FAST SERPL-MCNC: 129 MG/DL (ref 65–99)
HBA1C MFR BLD: 6.2 %
HDLC SERPL-MCNC: 46 MG/DL
LDLC SERPL CALC-MCNC: 106 MG/DL (ref 0–100)
POTASSIUM SERPL-SCNC: 4.5 MMOL/L (ref 3.5–5.3)
PROT SERPL-MCNC: 7.6 G/DL (ref 6.4–8.2)
SODIUM SERPL-SCNC: 141 MMOL/L (ref 136–145)
TRIGL SERPL-MCNC: 110 MG/DL

## 2022-03-01 PROCEDURE — 80053 COMPREHEN METABOLIC PANEL: CPT

## 2022-03-01 PROCEDURE — 83036 HEMOGLOBIN GLYCOSYLATED A1C: CPT

## 2022-03-01 PROCEDURE — 36415 COLL VENOUS BLD VENIPUNCTURE: CPT

## 2022-03-01 PROCEDURE — 80061 LIPID PANEL: CPT

## 2022-03-15 ENCOUNTER — CONSULT (OUTPATIENT)
Dept: PULMONOLOGY | Facility: CLINIC | Age: 72
End: 2022-03-15
Payer: MEDICARE

## 2022-03-15 VITALS
HEART RATE: 63 BPM | SYSTOLIC BLOOD PRESSURE: 118 MMHG | BODY MASS INDEX: 23.04 KG/M2 | TEMPERATURE: 97.3 F | RESPIRATION RATE: 18 BRPM | DIASTOLIC BLOOD PRESSURE: 80 MMHG | HEIGHT: 68 IN | WEIGHT: 152 LBS | OXYGEN SATURATION: 98 %

## 2022-03-15 DIAGNOSIS — Z77.090 ASBESTOS EXPOSURE: ICD-10-CM

## 2022-03-15 DIAGNOSIS — E78.5 HYPERLIPIDEMIA, UNSPECIFIED HYPERLIPIDEMIA TYPE: ICD-10-CM

## 2022-03-15 DIAGNOSIS — R91.8 PULMONARY NODULES: ICD-10-CM

## 2022-03-15 DIAGNOSIS — Z87.891 HISTORY OF SMOKING: ICD-10-CM

## 2022-03-15 PROCEDURE — 99204 OFFICE O/P NEW MOD 45 MIN: CPT | Performed by: INTERNAL MEDICINE

## 2022-03-15 RX ORDER — ROSUVASTATIN CALCIUM 20 MG/1
TABLET, COATED ORAL
Qty: 180 TABLET | Refills: 0 | OUTPATIENT
Start: 2022-03-15

## 2022-03-15 RX ORDER — ROSUVASTATIN CALCIUM 20 MG/1
40 TABLET, COATED ORAL
Qty: 180 TABLET | Refills: 0 | Status: SHIPPED | OUTPATIENT
Start: 2022-03-15 | End: 2022-06-08

## 2022-03-15 NOTE — PROGRESS NOTES
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  He previously smoked up to 1 pack per day for approximately 11 years and quit at the age of 33  He denies any known pulmonary diseases and states that exercise tolerance is good    Exercise Tolerance:  Good but cannot quantify how much he can walk      Past Medical Hx  Hypertension  Asbestos exposure  Lung nodules    Past Surgical Hx  No significant thoracic surgeries      Family Hx  Maternal-diabetes  Paternal-coronary disease      Occupational History:   Previously worked as a supervisor for asbestos removal  Also worked as a  working on car brakes and clutches    Social History:   Previously smoked 1 pack per day for approximately 11 years but quit at the age of 33       Pertinent Meds  No inhalers      ROS:  Constitutional: - Fatigue, - chills, - fever, - weight change  HEENT: - rhinorrhea, - sneezing, - sore throat  Respiratory: - cough, - sputum production, - shortness of breath, - wheezing  Cardiovascular: - chest pain,  -palpitations, - leg swelling  Gastrointestinal: - abdominal pain, - constipation, - diarrhea, - nausea, - vomiting  Endocrine: - cold intolerance, - heat intolerance  Genitourinary: - dysuria  Musculoskeletal: - arthralgias  Skin:- rash, - wound     Allergic/Immunologic: - allergies  Neurological: - dizziness, - numbness      Vitals: Blood pressure 118/80, pulse 63, temperature (!) 97 3 °F (36 3 °C), temperature source Tympanic, resp  rate 18, height 5' 8" (1 727 m), weight 68 9 kg (152 lb), SpO2 98 %  , Body mass index is 23 11 kg/m²  Physical Exam  GEN  NAD  NECK  supple, no JVD, no LAD  CV  +s1s2, no mrg, RRR  PULM  CTA BL, no wrr  ABD  soft, ntnd, + BS  EXT  no edema, no cyanosis, no clubbing  NEURO  Aox3, no focal weakness    Imaging and other studies     I have personally viewed and interpreted the following studies:  Chest x-ray 01/28/2022 shows 2 small calcified appearing lung nodules in the right upper lobe seen by me  Pulmonary function testing:   None    Assessment:  1  Pulmonary nodules  2  Asbestos exposure  3  Prior tobacco abuse    Plan:   Check CT chest   Check PFTs   Patient will obtain disc of last CT scan from [facility] Patient no longer abusing tobacco   Patient instructed to call with any development of pulmonary symptoms    Return visit in 1 month  On next visit we will evaluate new CT chest, compare to previous CT chest, PFT results, symptoms of any      TOREY Zaidi [facility]

## 2022-03-16 DIAGNOSIS — M19.042 ARTHRITIS OF FINGER OF LEFT HAND: ICD-10-CM

## 2022-03-22 NOTE — RESULT ENCOUNTER NOTE
Blood glucose and hemoglobin A1c somewhat elevated but stable for known prediabetes  Cholesterol is stable but 10 year cardiovascular risk is 19%  We can discuss possibly increasing statin at next visit   This can also be improved with lifestyle changes including regular cardiovascular exercise (>150 minutes/week of moderate intensity), avoiding foods high in saturated fat (e g  cheese, butter, and red meat), and increasing foods high in soluble fiber (oats, beans, etc )

## 2022-04-25 ENCOUNTER — HOSPITAL ENCOUNTER (OUTPATIENT)
Dept: CT IMAGING | Facility: HOSPITAL | Age: 72
Discharge: HOME | End: 2022-04-25
Attending: INTERNAL MEDICINE
Payer: MEDICARE

## 2022-04-25 ENCOUNTER — HOSPITAL ENCOUNTER (OUTPATIENT)
Dept: PULMONOLOGY | Facility: HOSPITAL | Age: 72
Discharge: HOME | End: 2022-04-25
Attending: INTERNAL MEDICINE
Payer: MEDICARE

## 2022-04-25 DIAGNOSIS — Z87.891 HISTORY OF SMOKING: ICD-10-CM

## 2022-04-25 DIAGNOSIS — R91.8 PULMONARY NODULES: ICD-10-CM

## 2022-04-25 PROCEDURE — 94060 EVALUATION OF WHEEZING: CPT

## 2022-04-25 PROCEDURE — 94729 DIFFUSING CAPACITY: CPT

## 2022-04-25 PROCEDURE — 94060 EVALUATION OF WHEEZING: CPT | Performed by: INTERNAL MEDICINE

## 2022-04-25 PROCEDURE — 94618 PULMONARY STRESS TESTING: CPT | Performed by: INTERNAL MEDICINE

## 2022-04-25 PROCEDURE — 71250 CT THORAX DX C-: CPT

## 2022-04-25 PROCEDURE — 94761 N-INVAS EAR/PLS OXIMETRY MLT: CPT

## 2022-04-25 PROCEDURE — 94729 DIFFUSING CAPACITY: CPT | Performed by: INTERNAL MEDICINE

## 2022-04-25 PROCEDURE — 94726 PLETHYSMOGRAPHY LUNG VOLUMES: CPT

## 2022-04-25 PROCEDURE — G1004 CDSM NDSC: HCPCS

## 2022-04-25 PROCEDURE — 94726 PLETHYSMOGRAPHY LUNG VOLUMES: CPT | Performed by: INTERNAL MEDICINE

## 2022-04-25 RX ORDER — ALBUTEROL SULFATE 2.5 MG/3ML
2.5 SOLUTION RESPIRATORY (INHALATION) ONCE
Status: COMPLETED | OUTPATIENT
Start: 2022-04-25 | End: 2022-04-25

## 2022-04-25 RX ADMIN — ALBUTEROL SULFATE 2.5 MG: 2.5 SOLUTION RESPIRATORY (INHALATION) at 09:50

## 2022-04-28 ENCOUNTER — OFFICE VISIT (OUTPATIENT)
Dept: PULMONOLOGY | Facility: CLINIC | Age: 72
End: 2022-04-28
Payer: MEDICARE

## 2022-04-28 VITALS
OXYGEN SATURATION: 97 % | TEMPERATURE: 95.6 F | DIASTOLIC BLOOD PRESSURE: 85 MMHG | HEIGHT: 68 IN | BODY MASS INDEX: 23.22 KG/M2 | RESPIRATION RATE: 18 BRPM | HEART RATE: 60 BPM | SYSTOLIC BLOOD PRESSURE: 118 MMHG | WEIGHT: 153.2 LBS

## 2022-04-28 DIAGNOSIS — R91.8 PULMONARY NODULES: Primary | ICD-10-CM

## 2022-04-28 DIAGNOSIS — Z87.891 HISTORY OF SMOKING: ICD-10-CM

## 2022-04-28 PROCEDURE — 99214 OFFICE O/P EST MOD 30 MIN: CPT | Performed by: INTERNAL MEDICINE

## 2022-04-28 NOTE — PROGRESS NOTES
Pulmonary Follow Up Note  Gavi Joseph 71 y o  male MRN: [id number]  4/28/2022      HPI:    Patient continues to have no significant shortness of breath or cough  No sputum production  No fevers or chills  No weight loss or night sweats  Exercise Tolerance:  Good  No significant exercise intolerance       Meds:  No inhalers    ROS:  Constitutional: - Fatigue, - chills, - fever, - weight change  HEENT: - rhinorrhea, - sneezing, - sore throat  Respiratory: - cough, - shortness of breath, - wheezing  Cardiovascular: - chest pain,  -palpitations, - leg swelling  Gastrointestinal: - abdominal pain, - constipation, - diarrhea, - nausea, - vomiting  Endocrine: - cold intolerance, - heat intolerance  Genitourinary: - dysuria  Musculoskeletal: - arthralgias  Skin:- rash, - wound  Allergic/Immunologic: - allergies  Neurological: - dizziness, - numbness        Vitals: Blood pressure 118/85, pulse 60, temperature (!) 95 6 °F (35 3 °C), temperature source Tympanic, resp  rate 18, height 5' 8" (1 727 m), weight 69 5 kg (153 lb 3 2 oz), SpO2 97 %  , Body mass index is 23 29 kg/m²      Physical Exam:  GEN  NAD  HEENT  ncat, non icteric, MM moist  NECK  supple, no JVD, no LAD  CV  +s1s2, no mrg, RRR  PULM  CTA BL, no wrr  ABD  soft, ntnd, + BS  EXT  no edema, no cyanosis, no clubbing  NEURO  Aox3, no focal weakness    Imaging and other studies:   I personally viewed interpreted following imaging studies:  CT chest 04/25/2022 shows 2 small calcified pulmonary nodules that appear similar to that of compared to previous CT chest     Pulmonary function testing:   I personally interpreted following pulmonary function tests from 04/25/2022:  Spirometry is consistent with small airways disease  Lung volumes are normal  Diffusing capacity is normal  Flow volume loop is consistent with small airways disease  No significant desaturation on 6 minutes walk test    Assessment:  Pulmonary nodules  Asbestos exposure  Prior tobacco abuse    Plan:  · CT chest similar to previous  · PFT's show small airway disease but is otherwise normal  · We will scan in [facility]  · Patient continues to have no pulmonary symptoms  · Repeat PFTs in 1 year    Return visit in 1 year (after repeat PFTs)  On next visit we will evaluate repeat PFTs, symptoms of any    Sergei TOREY Willson    [facility]    Answers for HPI/ROS submitted by the patient on 4/21/2022  Do you have a cough?: Yes  When did you first notice your symptoms?: more than 1 month ago  How often do your symptoms occur?: intermittently  Since you first noticed this problem, how has it changed?: unchanged  Have you had a change in appetite?: No  Do you have chest pain?: No  Do you have shortness of breath that occurs with effort or exertion?: No  Do you have ear congestion?: No  Do you have ear pain?: No  Do you have a fever?: No  Do you have headaches?: No  Do you have heartburn?: No  Do you have fatigue?: No  Do you have muscle pain?: No  Do you have nasal congestion?: No  Do you have shortness of breath when lying flat?: No  Do you have shortness of breath when you wake up?: No  Do you have post-nasal drip?: No  Do you have a runny nose?: No  Do you have sneezing?: No  Do you have a sore throat?: No  Do you have sweats?: No  Do you have trouble swallowing?: No  Have you experienced weight loss?: No  Which of the following makes your symptoms worse?: nothing  Which of the following makes your symptoms better?: nothing

## 2022-06-08 ENCOUNTER — TELEPHONE (OUTPATIENT)
Dept: FAMILY MEDICINE CLINIC | Facility: OTHER | Age: 72
End: 2022-06-08

## 2022-06-08 DIAGNOSIS — I10 BENIGN ESSENTIAL HYPERTENSION: ICD-10-CM

## 2022-06-08 DIAGNOSIS — I10 BENIGN ESSENTIAL HYPERTENSION: Primary | ICD-10-CM

## 2022-06-08 DIAGNOSIS — N40.0 BENIGN PROSTATIC HYPERPLASIA, UNSPECIFIED WHETHER LOWER URINARY TRACT SYMPTOMS PRESENT: ICD-10-CM

## 2022-06-08 DIAGNOSIS — E78.5 HYPERLIPIDEMIA, UNSPECIFIED HYPERLIPIDEMIA TYPE: ICD-10-CM

## 2022-06-08 DIAGNOSIS — Z91.89 AT HIGH RISK FOR TICK BORNE ILLNESS: ICD-10-CM

## 2022-06-08 RX ORDER — ROSUVASTATIN CALCIUM 20 MG/1
TABLET, COATED ORAL
Qty: 180 TABLET | Refills: 0 | Status: SHIPPED | OUTPATIENT
Start: 2022-06-08

## 2022-06-08 RX ORDER — AMLODIPINE BESYLATE 5 MG/1
TABLET ORAL
Qty: 90 TABLET | Refills: 1 | Status: SHIPPED | OUTPATIENT
Start: 2022-06-08

## 2022-06-08 RX ORDER — FINASTERIDE 5 MG/1
TABLET, FILM COATED ORAL
Qty: 45 TABLET | Refills: 1 | Status: SHIPPED | OUTPATIENT
Start: 2022-06-08

## 2022-06-08 NOTE — TELEPHONE ENCOUNTER
The patient called asking if you can place lab orders for routine blood work and for lyme before his next appt  He was in [place] and had a tick on him and he wants to be sure    Thank you!

## 2022-06-16 ENCOUNTER — LAB (OUTPATIENT)
Dept: LAB | Facility: AMBULARY SURGERY CENTER | Age: 72
End: 2022-06-16
Payer: MEDICARE

## 2022-06-16 DIAGNOSIS — Z91.89 AT HIGH RISK FOR TICK BORNE ILLNESS: ICD-10-CM

## 2022-06-16 DIAGNOSIS — I10 BENIGN ESSENTIAL HYPERTENSION: ICD-10-CM

## 2022-06-16 DIAGNOSIS — E78.5 HYPERLIPIDEMIA, UNSPECIFIED HYPERLIPIDEMIA TYPE: ICD-10-CM

## 2022-06-16 LAB
ALBUMIN SERPL BCP-MCNC: 3.8 G/DL (ref 3.5–5)
ALP SERPL-CCNC: 49 U/L (ref 46–116)
ALT SERPL W P-5'-P-CCNC: 28 U/L (ref 12–78)
ANION GAP SERPL CALCULATED.3IONS-SCNC: 2 MMOL/L (ref 4–13)
AST SERPL W P-5'-P-CCNC: 19 U/L (ref 5–45)
BILIRUB SERPL-MCNC: 0.53 MG/DL (ref 0.2–1)
BUN SERPL-MCNC: 20 MG/DL (ref 5–25)
CALCIUM SERPL-MCNC: 9.4 MG/DL (ref 8.3–10.1)
CHLORIDE SERPL-SCNC: 107 MMOL/L (ref 100–108)
CHOLEST SERPL-MCNC: 154 MG/DL
CO2 SERPL-SCNC: 30 MMOL/L (ref 21–32)
CREAT SERPL-MCNC: 1.18 MG/DL (ref 0.6–1.3)
GFR SERPL CREATININE-BSD FRML MDRD: 61 ML/MIN/1.73SQ M
GLUCOSE P FAST SERPL-MCNC: 124 MG/DL (ref 65–99)
HDLC SERPL-MCNC: 52 MG/DL
LDLC SERPL CALC-MCNC: 85 MG/DL (ref 0–100)
POTASSIUM SERPL-SCNC: 4.6 MMOL/L (ref 3.5–5.3)
PROT SERPL-MCNC: 7.1 G/DL (ref 6.4–8.2)
SODIUM SERPL-SCNC: 139 MMOL/L (ref 136–145)
TRIGL SERPL-MCNC: 83 MG/DL

## 2022-06-16 PROCEDURE — 80061 LIPID PANEL: CPT

## 2022-06-16 PROCEDURE — 86618 LYME DISEASE ANTIBODY: CPT

## 2022-06-16 PROCEDURE — 80053 COMPREHEN METABOLIC PANEL: CPT

## 2022-06-16 PROCEDURE — 36415 COLL VENOUS BLD VENIPUNCTURE: CPT

## 2022-06-17 LAB — B BURGDOR IGG+IGM SER-ACNC: <0.2 AI

## 2022-06-23 ENCOUNTER — OFFICE VISIT (OUTPATIENT)
Dept: FAMILY MEDICINE CLINIC | Facility: OTHER | Age: 72
End: 2022-06-23
Payer: MEDICARE

## 2022-06-23 VITALS
HEART RATE: 72 BPM | RESPIRATION RATE: 16 BRPM | BODY MASS INDEX: 23.34 KG/M2 | SYSTOLIC BLOOD PRESSURE: 120 MMHG | HEIGHT: 68 IN | TEMPERATURE: 98 F | OXYGEN SATURATION: 96 % | WEIGHT: 154 LBS | DIASTOLIC BLOOD PRESSURE: 82 MMHG

## 2022-06-23 DIAGNOSIS — I10 BENIGN ESSENTIAL HYPERTENSION: Primary | ICD-10-CM

## 2022-06-23 DIAGNOSIS — R73.03 PREDIABETES: ICD-10-CM

## 2022-06-23 DIAGNOSIS — L98.9 DISORDER OF SKIN OF PENIS: ICD-10-CM

## 2022-06-23 DIAGNOSIS — K13.70 ORAL MUCOSAL LESION: ICD-10-CM

## 2022-06-23 DIAGNOSIS — E78.5 HYPERLIPIDEMIA, UNSPECIFIED HYPERLIPIDEMIA TYPE: ICD-10-CM

## 2022-06-23 PROCEDURE — 99213 OFFICE O/P EST LOW 20 MIN: CPT | Performed by: FAMILY MEDICINE

## 2022-06-23 NOTE — PROGRESS NOTES
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  Patient will follow-up with dermatology as previously scheduled  No problem-specific Assessment & Plan notes found for this encounter  Diagnoses and all orders for this visit:    Benign essential hypertension    Hyperlipidemia, unspecified hyperlipidemia type    Prediabetes    Oral mucosal lesion  -     Ambulatory Referral to Oral Maxillofacial Surgery; Future    Disorder of skin of penis        Subjective:      Patient ID: Sonya Mac is a 71 y o  male  The patient presents for follow-up concerning hypertension and hyperlipidemia  He denies any symptoms of hypertension other than rare dizziness when he stands up from a bent over position  He is taking all his medications as prescribed but has not been checking his blood pressure regularly at home  The patient notes that he has a spot on the inside of his right cheek which is new  He saw his dentist who recommended he see an oral surgeon for removal   He denies any pain or bleeding except when he accidentally bites it  The patient also reports a blue discoloration of the skin on his penis which has been present for several years  He denies any pain or recent change  Review of Systems   Constitutional: Negative for chills, fever and unexpected weight change  HENT: Negative for congestion, rhinorrhea and sore throat  Eyes: Negative for visual disturbance  Respiratory: Positive for cough (chronic)  Negative for shortness of breath and wheezing  Cardiovascular: Negative for chest pain, palpitations and leg swelling  Gastrointestinal: Negative for abdominal pain, blood in stool, constipation, diarrhea, nausea and vomiting  Genitourinary: Negative for difficulty urinating, dysuria, genital sores, hematuria, penile discharge, penile pain, penile swelling, scrotal swelling and testicular pain  Musculoskeletal: Negative for arthralgias and myalgias  Skin: Positive for color change  Negative for rash  Neurological: Negative for dizziness, light-headedness and headaches  Psychiatric/Behavioral: Negative for dysphoric mood  All other systems reviewed and are negative  Objective:      /82   Pulse 72   Temp 98 °F (36 7 °C)   Resp 16   Ht 5' 8" (1 727 m)   Wt 69 9 kg (154 lb)   SpO2 96%   BMI 23 42 kg/m²          Physical Exam  Vitals reviewed  Constitutional:       General: He is not in acute distress  Appearance: Normal appearance  He is well-developed  He is not diaphoretic  HENT:      Head: Normocephalic and atraumatic  Right Ear: External ear normal       Left Ear: External ear normal       Nose: Nose normal       Mouth/Throat:      Mouth: Mucous membranes are moist  Oral lesions (<1 cm diameter violaceous lesion of the R buccal mucosa) present  Pharynx: Oropharynx is clear  Eyes:      Extraocular Movements: Extraocular movements intact  Conjunctiva/sclera: Conjunctivae normal       Pupils: Pupils are equal, round, and reactive to light  Cardiovascular:      Rate and Rhythm: Normal rate and regular rhythm  Pulses: Normal pulses  Heart sounds: Normal heart sounds  No murmur heard  No friction rub  No gallop  Pulmonary:      Effort: Pulmonary effort is normal  No respiratory distress  Breath sounds: Normal breath sounds  No stridor  No wheezing, rhonchi or rales  Abdominal:      General: Abdomen is flat  Bowel sounds are normal  There is no distension  Palpations: Abdomen is soft  There is no mass  Tenderness: There is no abdominal tenderness  There is no right CVA tenderness, left CVA tenderness or guarding  Genitourinary:     Comments: Multiple small violaceous papules surrounding coronal sulcus  Musculoskeletal:         General: Normal range of motion  Cervical back: Normal range of motion and neck supple  Right lower leg: No edema  Left lower leg: No edema  Lymphadenopathy:      Cervical: No cervical adenopathy  Skin:     General: Skin is warm and dry  Neurological:      General: No focal deficit present  Mental Status: He is alert and oriented to person, place, and time     Psychiatric:         Mood and Affect: Mood normal          Behavior: Behavior normal

## 2022-06-23 NOTE — PATIENT INSTRUCTIONS
Oral Lesion Excision   AMBULATORY CARE:   An oral lesion excision  is surgery to remove a sore, ulcer, or patch (lesion) from inside your mouth  This includes the inner lip or cheek lining, gums, tongue, and floor and roof of the mouth  Removal may be the only treatment needed for the lesion, or may be part of your treatment plan  How to prepare for surgery: Your surgeon will tell you how to prepare  He or she may tell you not to eat or drink anything after midnight on the day before surgery  Arrange to have someone drive you home after you are discharged  Tell your surgeon about all medicines you currently take  He or she will tell you if you need to stop any medicine for surgery, and when to stop  He or she will tell you which medicines to take or not take on the day of surgery  Tell your surgeon about all your allergies, including antibiotics or anesthesia  What will happen during surgery: You may be given general anesthesia to keep you asleep and free from pain during surgery  You may instead be given local anesthesia to numb the surgery area  You will be awake with local anesthesia, but you should not feel pain  Your surgeon may use a knife or laser to cut the lesion away  He or she will remove tissue that looks healthy from around and under the lesion  This helps make sure that as much of the lesion as possible is removed  Your surgeon may have the lesion tested to find out if it is benign (not cancer) or malignant (cancer)  The area where the lesion was removed may need to be closed with stitches  This depends on where it was and how large an area was removed  Medicine may be put on the area to control bleeding  Gauze bandages may be packed over the area to keep it clean and prevent more bleeding  What to expect after surgery: You may have some bleeding, redness, or swelling near the surgery area  These are expected and should get better within a day or two      You may have trouble opening your mouth fully for a few days after surgery  Risks of an oral lesion excision:  You may bleed more than expected or develop an infection  Depending on where the lesion is, removal may cause nerve damage  Your surgeon may not be able to remove all of the unhealthy tissue  Even with surgery, you may develop another oral lesion  Cancer cells may still spread or come back  You may have scarring from where the skin tissue was removed  Call your local emergency number (911 in the [address]) if:   You have trouble breathing  Seek care immediately if:   You are bleeding more than you were told to expect, even when you apply pressure  You have trouble swallowing  You have sudden numbness in your face  You have severe pain  You cannot move part of your face  Call your dentist or oral surgeon if:   You have a fever  You have increased swelling, redness, or bleeding  You have pain that does not go away, or is not helped by pain medicines  You have yellow or green drainage coming out of the surgery area  You have trouble opening your mouth or chewing  You have questions or concerns about your condition or care  Medicines: You may need any of the following:  Antibiotics  help treat or prevent an infection caused by bacteria  Acetaminophen  decreases pain and fever  It is available without a doctor's order  Ask how much to take and how often to take it  Follow directions  Read the labels of all other medicines you are using to see if they also contain acetaminophen, or ask your doctor or pharmacist  Acetaminophen can cause liver damage if not taken correctly  Do not use more than 4 grams (4,000 milligrams) total of acetaminophen in one day  Prescription pain medicine  may be given  Ask your healthcare provider how to take this medicine safely  Some prescription pain medicines contain acetaminophen   Do not take other medicines that contain acetaminophen without talking to your healthcare provider  Too much acetaminophen may cause liver damage  Prescription pain medicine may cause constipation  Ask your healthcare provider how to prevent or treat constipation  Take your medicine as directed  Contact your healthcare provider if you think your medicine is not helping or if you have side effects  Tell him of her if you are allergic to any medicine  Keep a list of the medicines, vitamins, and herbs you take  Include the amounts, and when and why you take them  Bring the list or the pill bottles to follow-up visits  Carry your medicine list with you in case of an emergency  Self-care for the first 24 hours after surgery:   Rest  as needed after surgery  Your healthcare provider will tell you when it okay to drive and return to your daily activities  Your provider may tell you to sleep with your head and upper body elevated to keep the swelling down  You may want to use a pillow or two  Do not prop a young child's head up with pillows  A pillow might fall onto a child's face, and he or she may not be able to move it off  Ask your child's provider about how to elevate a young child's head safely  Keep any gauze in place  as directed  This helps to control bleeding  Do not rinse your mouth for 24 hours,  or as directed  Eat only soft foods and liquids  for 24 hours  This helps control pain  Apply ice  over the surgery area for 15 to 20 minutes every hour, or as directed  Ice helps with pain and swelling  Use an ice pack, or put crushed ice in a plastic bag  Cover it with a towel before you apply it to your skin  Do not  apply heat for at least 24 hours after surgery  Heat will make the swelling worse  Self-care starting 24 hours after surgery:   Rinse your mouth gently  with warm water  Your healthcare provider may want you rinse with warm salt water  Add ½ teaspoon of salt to 1 cup of warm water to make the salt water   Rinse your mouth gently 4 times each day, or as often as directed  Your provider will tell you how many days to do this  Apply heat  over the surgery area for 15 minutes every hour, or as directed  Heat helps increase blood circulation and lowers swelling  Manage or prevent oral lesions:   Go to regular dental visits  Your dentist will check for and may need to fix problems with your teeth  He or she will also do routine oral cancer screenings  Have your teeth checked and cleaned 2 times each year, or as often as directed  Brush and floss your teeth safely  Ask when it is safe to begin brushing and flossing after surgery  Gently brush your teeth with a soft toothbrush  Brush and floss each day as often as directed after the surgery area heals  Do not use an alcohol-based mouth rinse  Protect your mouth if you wear braces  Some kinds of braces can irritate your gums, tongue, and inner cheeks  Use dental wax to cover any parts that poke or scrape your mouth  Talk to your dentist or orthodontist if you think the braces need to be adjusted  Care for your mouth if you wear dentures  It is still important to clean your gums and mouth if you wear dentures  Be extra careful when you put in or remove dentures  Try to prevent any injuries to your gums that could lead to sores or infection  Soak your dentures in denture solution at night to keep them free from bacteria  Do not use tobacco products  Tobacco products increase the risk for oral cancer  Ask your healthcare provider for information if you currently use tobacco products and need help to quit  E-cigarettes or smokeless tobacco still contain nicotine  Talk to your healthcare provider before you use these products  Do not drink alcohol  Alcohol increases the risk for mouth cancer  Alcohol can also irritate the surgery area  Be careful with foods and drinks that can irritate your mouth  Examples include citrus fruits (such as orange juice), and hard, salty, or crunchy foods   These can irritate your surgery area or cause new lesions  A dietitian may help to plan the best meals and snacks for you  Follow up with your dentist or oral surgeon as directed: You may need to return to have your stitches removed  You may need to come back for results if the lesion was sent to a lab for tests  If tests show the lesion was pre-cancer, you will need to come in over time to have it checked  You may be referred to a specialist for more tests or treatment  Write down your questions so you remember to ask them during your visits  © Copyright [employer] 2022 Information is for End User's use only and may not be sold, redistributed or otherwise used for commercial purposes  All illustrations and images included in CareNotes® are the copyrighted property of [employer]  or Anais Simons  The above information is an  only  It is not intended as medical advice for individual conditions or treatments  Talk to your doctor, nurse or pharmacist before following any medical regimen to see if it is safe and effective for you

## 2022-07-27 ENCOUNTER — RA CDI HCC (OUTPATIENT)
Dept: OTHER | Facility: HOSPITAL | Age: 72
End: 2022-07-27

## 2022-07-27 NOTE — PROGRESS NOTES
[address]  coding opportunities       Chart reviewed, no opportunity found: CHART REVIEWED, NO OPPORTUNITY FOUND        Patients Insurance     Medicare Insurance: Medicare

## 2022-08-04 ENCOUNTER — OFFICE VISIT (OUTPATIENT)
Dept: FAMILY MEDICINE CLINIC | Facility: OTHER | Age: 72
End: 2022-08-04
Payer: MEDICARE

## 2022-08-04 VITALS
WEIGHT: 154.6 LBS | RESPIRATION RATE: 18 BRPM | SYSTOLIC BLOOD PRESSURE: 112 MMHG | HEART RATE: 75 BPM | OXYGEN SATURATION: 96 % | HEIGHT: 68 IN | TEMPERATURE: 97.8 F | DIASTOLIC BLOOD PRESSURE: 68 MMHG | BODY MASS INDEX: 23.43 KG/M2

## 2022-08-04 DIAGNOSIS — Z13.6 SCREENING FOR AAA (ABDOMINAL AORTIC ANEURYSM): ICD-10-CM

## 2022-08-04 DIAGNOSIS — Z00.00 MEDICARE ANNUAL WELLNESS VISIT, SUBSEQUENT: Primary | ICD-10-CM

## 2022-08-04 DIAGNOSIS — Z12.5 SCREENING FOR PROSTATE CANCER: ICD-10-CM

## 2022-08-04 DIAGNOSIS — R35.1 NOCTURIA: ICD-10-CM

## 2022-08-04 PROCEDURE — G0439 PPPS, SUBSEQ VISIT: HCPCS | Performed by: FAMILY MEDICINE

## 2022-08-04 NOTE — PATIENT INSTRUCTIONS
Vaccines  gov for COVID vaccine information/locations      Medicare Preventive Visit Patient Instructions  Thank you for completing your Welcome to Medicare Visit or Medicare Annual Wellness Visit today  Your next wellness visit will be due in one year (8/5/2023)  The screening/preventive services that you may require over the next 5-10 years are detailed below  Some tests may not apply to you based off risk factors and/or age  Screening tests ordered at today's visit but not completed yet may show as past due  Also, please note that scanned in results may not display below  Preventive Screenings:  Service Recommendations Previous Testing/Comments   Colorectal Cancer Screening  Colonoscopy    Fecal Occult Blood Test (FOBT)/Fecal Immunochemical Test (FIT)  Fecal DNA/Cologuard Test  Flexible Sigmoidoscopy Age: 54-65 years old   Colonoscopy: every 10 years (May be performed more frequently if at higher risk)  OR  FOBT/FIT: every 1 year  OR  Cologuard: every 3 years  OR  Sigmoidoscopy: every 5 years  Screening may be recommended earlier than age 48 if at higher risk for colorectal cancer  Also, an individualized decision between you and your healthcare provider will decide whether screening between the ages of 74-80 would be appropriate   Colonoscopy: 08/22/2018  FOBT/FIT: Not on file  Cologuard: Not on file  Sigmoidoscopy: Not on file    Screening Current     Prostate Cancer Screening Individualized decision between patient and health care provider in men between ages of 53-78   Medicare will cover every 12 months beginning on the day after your 50th birthday PSA: 0 3 ng/mL           Hepatitis C Screening Once for adults born between 1945 and 1965  More frequently in patients at high risk for Hepatitis C Hep C Antibody: 02/25/2021    Screening Current   Diabetes Screening 1-2 times per year if you're at risk for diabetes or have pre-diabetes Fasting glucose: 124 mg/dL   A1C: 6 2 %    Screening Current   Cholesterol Screening Once every 5 years if you don't have a lipid disorder  May order more often based on risk factors  Lipid panel: 06/16/2022    Screening Not Indicated  History Lipid Disorder      Other Preventive Screenings Covered by Medicare:  Abdominal Aortic Aneurysm (AAA) Screening: covered once if your at risk  You're considered to be at risk if you have a family history of AAA or a male between the age of 73-68 who smoking at least 100 cigarettes in your lifetime  Lung Cancer Screening: covers low dose CT scan once per year if you meet all of the following conditions: (1) Age 50-69; (2) No signs or symptoms of lung cancer; (3) Current smoker or have quit smoking within the last 15 years; (4) You have a tobacco smoking history of at least 30 pack years (packs per day x number of years you smoked); (5) You get a written order from a healthcare provider  Glaucoma Screening: covered annually if you're considered high risk: (1) You have diabetes OR (2) Family history of glaucoma OR (3)  aged 48 and older OR (3)  American aged 72 and older  Osteoporosis Screening: covered every 2 years if you meet one of the following conditions: (1) Have a vertebral abnormality; (2) On glucocorticoid therapy for more than 3 months; (3) Have primary hyperparathyroidism; (4) On osteoporosis medications and need to assess response to drug therapy  HIV Screening: covered annually if you're between the age of 12-76  Also covered annually if you are younger than 13 and older than 72 with risk factors for HIV infection  For pregnant patients, it is covered up to 3 times per pregnancy      Immunizations:  Immunization Recommendations   Influenza Vaccine Annual influenza vaccination during flu season is recommended for all persons aged >= 6 months who do not have contraindications   Pneumococcal Vaccine (Prevnar and Pneumovax)  * Prevnar = PCV13  * Pneumovax = PPSV23 Adults 25-60 years old: 1-3 doses may be recommended based on certain risk factors  Adults 72 years old: Prevnar (PCV13) vaccine recommended followed by Pneumovax (PPSV23) vaccine  If already received PPSV23 since turning 65, then PCV13 recommended at least one year after PPSV23 dose  Hepatitis B Vaccine 3 dose series if at intermediate or high risk (ex: diabetes, end stage renal disease, liver disease)   Tetanus (Td) Vaccine - COST NOT COVERED BY MEDICARE PART B Following completion of primary series, a booster dose should be given every 10 years to maintain immunity against tetanus  Td may also be given as tetanus wound prophylaxis  Tdap Vaccine - COST NOT COVERED BY MEDICARE PART B Recommended at least once for all adults  For pregnant patients, recommended with each pregnancy  Shingles Vaccine (Shingrix) - COST NOT COVERED BY MEDICARE PART B  2 shot series recommended in those aged 48 and above     Health Maintenance Due:      Topic Date Due    Colorectal Cancer Screening  08/22/2028    Hepatitis C Screening  Completed     Immunizations Due:      Topic Date Due    COVID-19 Vaccine (4 - Booster for Pfizer series) 03/06/2022    Influenza Vaccine (1) 09/01/2022     Advance Directives   What are advance directives? Advance directives are legal documents that state your wishes and plans for medical care  These plans are made ahead of time in case you lose your ability to make decisions for yourself  Advance directives can apply to any medical decision, such as the treatments you want, and if you want to donate organs  What are the types of advance directives? There are many types of advance directives, and each state has rules about how to use them  You may choose a combination of any of the following:  Living will: This is a written record of the treatment you want  You can also choose which treatments you do not want, which to limit, and which to stop at a certain time  This includes surgery, medicine, IV fluid, and tube feedings     Durable power of  for healthcare [facility]): This is a written record that states who you want to make healthcare choices for you when you are unable to make them for yourself  This person, called a proxy, is usually a family member or a friend  You may choose more than 1 proxy  Do not resuscitate (DNR) order:  A DNR order is used in case your heart stops beating or you stop breathing  It is a request not to have certain forms of treatment, such as CPR  A DNR order may be included in other types of advance directives  Medical directive: This covers the care that you want if you are in a coma, near death, or unable to make decisions for yourself  You can list the treatments you want for each condition  Treatment may include pain medicine, surgery, blood transfusions, dialysis, IV or tube feedings, and a ventilator (breathing machine)  Values history: This document has questions about your views, beliefs, and how you feel and think about life  This information can help others choose the care that you would choose  Why are advance directives important? An advance directive helps you control your care  Although spoken wishes may be used, it is better to have your wishes written down  Spoken wishes can be misunderstood, or not followed  Treatments may be given even if you do not want them  An advance directive may make it easier for your family to make difficult choices about your care  Alcohol Use and Your Health    Drinking too much can harm your health  Excessive alcohol use leads to about 88,000 death in the [place] each year, and shortens the life of those who diet by almost 30 years  Further, excessive drinking cost the economy $249 billion in 2010  Most excessive drinkers are not alcohol dependent  Excessive alcohol use has immediate effects that increase the risk of many harmful health conditions  These are most often the result of binge drinking    Over time, excessive alcohol use can lead to the development of chronic diseases and other series health problems  What is considered a "drink"? Excessive alcohol use includes:  Binge Drinking: For women, 4 or more drinks consumed on one occasion  For men, 5 or more drinks consumed on one occasion  Heavy Drinking: For women, 8 or more drinks per week  For men, 15 or more drinks per week  Any alcohol used by pregnant women  Any alcohol used by those under the age of 21 years    If you choose to drink, do so in moderation:  Do not drink at all if you are under the age of 24, or if you are or may be pregnant, or have health problems that could be made worse by drinking  For women, up to 1 drink per day  For men, up to 2 drinks a day    No one should begin drinking or drink more frequently based on potential health benefits    Short-Term Health Risks:  Injuries: motor vehicle crashes, falls, drownings, burns  Violence: homicide, suicide, sexual assault, intimate partner violence  Alcohol poisoning  Reproductive health: risky sexual behaviors, unintended prengnacy, sexually transmitted diseases, miscarriage, stillbirth, fetal alcohol syndrome    Long-Term Health Risks:  Chronic diseases: high blood pressure, heart disease, stroke, liver disease, digestive problems  Cancers: breast, mouth and throat, liver, colon  Learning and memory problems: dementia, poor school performance  Mental health: depression, anxiety, insomnia  Social problems: lost productivity, family problems, unemployment  Alcohol dependence    For support and more information:  Substance Abuse and [facility] , [address] [place]  Web Address: https://[employer]/    Alcoholics Anonymous        Web Address: http://[employer]/    https://www cdc gov/alcohol/fact-sheets/alcohol-use htm     © Copyright [employer] 2018 Information is for End User's use only and may not be sold, redistributed or otherwise used for commercial purposes   All illustrations and images included in CareNotes® are the copyrighted property of [employer] M [employer] Inc  or Anais Simons

## 2022-08-04 NOTE — PROGRESS NOTES
Assessment and Plan:     Problem List Items Addressed This Visit    None     Visit Diagnoses     Medicare annual wellness visit, subsequent    -  Primary    Screening for AAA (abdominal aortic aneurysm)        Relevant Orders    US abdominal aorta screening aaa    Screening for prostate cancer        Relevant Orders    PSA, total and free    Nocturia        Relevant Orders    PSA, total and free            Emotional and Mental Well-being, Sleep, Connectedness Assessment and Intervention:    Depression and anxiety screening performed and reviewed      Tobacco and Toxic Substance Assessment and Intervention:     Tobacco use screening performed    Alcohol and drug use screening performed    Brief intervention performed for tobacco, alcohol, or drug use      Therapeutic Lifestyle Change Visit:     One-on-one comprehensive counseling, coaching, and health behavior change visit completed         Preventive health issues were discussed with patient, and age appropriate screening tests were ordered as noted in patient's After Visit Summary  Personalized health advice and appropriate referrals for health education or preventive services given if needed, as noted in patient's After Visit Summary  History of Present Illness:     Patient presents for a Medicare Wellness Visit  He endorses occasional hand pain due to arthritis which is improved by diclofenac      Patient Care Team:  Dora Fajardo MD as PCP - General (Family Medicine)     Problem List:     Patient Active Problem List   Diagnosis    Hyperlipidemia    Prediabetes    Left leg pain    Asbestos exposure    Allergic rhinitis    Conductive hearing loss    Benign essential hypertension    Dysfunction of eustachian tube    History of smoking    Pulmonary nodules    Lumbar disc herniation with radiculopathy    Spondylolisthesis of lumbar region      Past Medical and Surgical History:     Past Medical History:   Diagnosis Date    Hernia, inguinal, right 2021    Hypertension     Irregular heart beat     Lung nodule      Past Surgical History:   Procedure Laterality Date    BUNIONECTOMY Right     COLONOSCOPY      HAND SURGERY Right     cyst excision    [place] REPAIR ING HERNIA,5+Y/O,REDUCIBL Right 3/15/2021    Procedure: INGUINAL HERNIA REPAIR;  Surgeon: Stefanie Schilling DO;  [facility]: [facility];  Service: General    SHOULDER SURGERY Right 2018      Family History:     Family History   Problem Relation Age of Onset    Diabetes Mother     Heart attack Father       Social History:     Social History     Socioeconomic History    Marital status:      Spouse name: None    Number of children: None    Years of education: None    Highest education level: None   Occupational History    None   Tobacco Use    Smoking status: Former Smoker     Packs/day: 1 00     Years: 10 00     Pack years: 10 00     Types: Cigarettes     Quit date:      Years since quittin 6    Smokeless tobacco: Never Used   Vaping Use    Vaping Use: Never used   Substance and Sexual Activity    Alcohol use: Yes     Alcohol/week: 1 0 standard drink     Types: 1 Glasses of wine per week     Comment: a glass of wine or two a night     Drug use: Never    Sexual activity: Yes     Partners: Female   Other Topics Concern    None   Social History Narrative    · Do you currently or have you served in [employer]:    Yes      · If Yes, What branch of service:   [employer]      · Were you activated, into active duty, as a member of the [employer] or as a Reservist:   No      Social Determinants of Health     Financial Resource Strain: Not on file   Food Insecurity: Not on file   Transportation Needs: Not on file   Physical Activity: Not on file   Stress: Not on file   Social Connections: Not on file   Intimate Partner Violence: Not on file   Housing Stability: Not on file      Medications and Allergies:     Current Outpatient Medications   Medication Sig Dispense Refill    amLODIPine (NORVASC) 5 mg tablet TAKE 1 TABLET(5 MG) BY MOUTH DAILY AT BEDTIME 90 tablet 1    Ascorbic Acid (vitamin C) 1000 MG tablet Take 1,000 mg by mouth daily      aspirin 81 mg chewable tablet Chew 81 mg daily at bedtime       Cholecalciferol (Vitamin D-3) 25 MCG (1000 UT) CAPS Take 1 capsule by mouth      Diclofenac Sodium (VOLTAREN) 1 % APPLY 2 GRAMS EXTERNALLY TO THE AFFECTED AREA FOUR TIMES DAILY      Diclofenac Sodium (VOLTAREN) 1 % APPLY 2 GRAMS EXTERNALLY TO THE AFFECTED AREA FOUR TIMES DAILY 100 g 2    diclofenac sodium (VOLTAREN) 50 mg EC tablet Take 2 tablets (100 mg total) by mouth 2 (two) times a day 120 tablet 0    finasteride (PROSCAR) 5 mg tablet TAKE 1/2 TABLET(2 5 MG) BY MOUTH DAILY AT BEDTIME 45 tablet 1    Multiple Vitamin (MULTIVITAMIN ADULT PO) Take 1 tablet by mouth daily      rosuvastatin (CRESTOR) 20 MG tablet TAKE 2 TABLETS(40 MG) BY MOUTH DAILY AT BEDTIME 180 tablet 0    Turmeric 400 MG CAPS Take 1 capsule by mouth daily       No current facility-administered medications for this visit       No Known Allergies   Immunizations:     Immunization History   Administered Date(s) Administered    COVID-19 PFIZER VACCINE 0 3 ML IM 03/07/2021, 03/28/2021, 11/06/2021    DTP 03/08/2012    Hep A, adult 04/01/2000, 10/13/2001    INFLUENZA 03/08/2012, 09/15/2013, 10/20/2014, 11/20/2015, 10/01/2016, 10/08/2021    Influenza Split High Dose Preservative Free IM 11/02/2017    Influenza Whole 12/11/1999, 12/13/2000, 11/04/2001, 10/09/2002, 10/18/2003    OPV 12/01/1970    Pneumococcal Conjugate 13-Valent 07/14/2016    Pneumococcal Polysaccharide PPV23 03/08/2012, 11/02/2017    Td (adult), Unspecified 03/08/2012    Td (adult), adsorbed 12/11/1999    Typhoid, Unspecified 02/05/2000, 04/01/2000, 02/01/2003    Yellow Fever 10/13/2001    Zoster 04/09/2012    Zoster Vaccine Recombinant 06/16/2021, 12/17/2021    influenza, trivalent, adjuvanted 11/06/2018      Health Maintenance:         Topic Date Due    Colorectal Cancer Screening  08/22/2028    Hepatitis C Screening  Completed         Topic Date Due    COVID-19 Vaccine (4 - Booster for Pfizer series) 03/06/2022    Influenza Vaccine (1) 09/01/2022      Medicare Screening Tests and Risk Assessments:     Chiquis Hollingsworth is here for his Subsequent Wellness visit  Last Medicare Wellness visit information reviewed, patient interviewed and updates made to the record today  Health Risk Assessment:   Patient rates overall health as very good  Patient feels that their physical health rating is same  Patient is very satisfied with their life  Eyesight was rated as slightly worse  Hearing was rated as same  Patient feels that their emotional and mental health rating is slightly better  Patients states they are never, rarely angry  Patient states they are never, rarely unusually tired/fatigued  Pain experienced in the last 7 days has been none  Patient states that he has experienced no weight loss or gain in last 6 months  Depression Screening:   PHQ-2 Score: 0      Fall Risk Screening: In the past year, patient has experienced: no history of falling in past year      Home Safety:  Patient does not have trouble with stairs inside or outside of their home  Patient has working smoke alarms and has working carbon monoxide detector  Home safety hazards include: none  Nutrition:   Current diet is Regular, Low Cholesterol, Low Saturated Fat and Low Carb  Medications:   Patient is currently taking over-the-counter supplements  OTC medications include: beet root, turmeric, Vit C, D, Restore FX  Patient is able to manage medications  Activities of Daily Living (ADLs)/Instrumental Activities of Daily Living (IADLs):   Walk and transfer into and out of bed and chair?: Yes  Dress and groom yourself?: Yes    Bathe or shower yourself?: Yes    Feed yourself?  Yes  Do your laundry/housekeeping?: Yes  Manage your money, pay your bills and track your expenses?: Yes  Make your own meals?: Yes    Do your own shopping?: Yes    Previous Hospitalizations:   Any hospitalizations or ED visits within the last 12 months?: No      Advance Care Planning:   Living will: Yes    Durable POA for healthcare: Yes    Advanced directive: Yes    Five wishes given: Yes      Comments: Patient reports he would like to update his advance care planning  Packet provided  Cognitive Screening:   Provider or family/friend/caregiver concerned regarding cognition?: No    PREVENTIVE SCREENINGS      Cardiovascular Screening:    General: Screening Not Indicated and History Lipid Disorder      Diabetes Screening:     General: Screening Current      Colorectal Cancer Screening:     General: Screening Current      Prostate Cancer Screening:      Due for: PSA      Osteoporosis Screening:    General: Screening Not Indicated      Abdominal Aortic Aneurysm (AAA) Screening:    Risk factors include: age between 73-69 yo and tobacco use      Due for: Screening AAA Ultrasound      Lung Cancer Screening:     General: Screening Not Indicated      Hepatitis C Screening:    General: Screening Current    Screening, Brief Intervention, and Referral to Treatment (SBIRT)    Screening  Typical number of drinks in a day: 2  Typical number of drinks in a week: 12  Interpretation: Low risk drinking behavior  AUDIT-C Screenin) How often did you have a drink containing alcohol in the past year? 4 or more times a week  2) How many drinks did you have on a typical day when you were drinking in the past year? 1 to 2  3) How often did you have 6 or more drinks on one occasion in the past year? less than monthly    AUDIT-C Score: 5  Interpretation: Score 4-12 (male): POSITIVE screen for alcohol misuse    AUDIT Screenin) How often during the last year have you found that you were not able to stop drinking once you had started?  0 - never  5) How often during the last year have you failed to do what was normally expected from you because of drinking? 0 - never  6) How often during the last year have you needed a first drink in the morning to get yourself going after a heavy drinking session? 0 - never  7) How often during the last year have you had a feeling of guilt or remorse after drinking? 0 - never  8) How often during the last year have you been unable to remember what happened the night before because you had been drinking? 0 - never  9) Have you or someone else been injured as a result of your drinking? 0 - no  10) Has a relative or friend or a doctor or another health worker been concerned about your drinking or suggested you cut down? 0 - no    AUDIT Score: 5  Interpretation: Low risk alcohol consumption    Single Item Drug Screening:  How often have you used an illegal drug (including marijuana) or a prescription medication for non-medical reasons in the past year? never    Single Item Drug Screen Score: 0  Interpretation: Negative screen for possible drug use disorder    Other Counseling Topics:   Car/seat belt/driving safety, sunscreen and regular weightbearing exercise  No exam data present     Physical Exam:     /68   Pulse 75   Temp 97 8 °F (36 6 °C)   Resp 18   Ht 5' 8" (1 727 m)   Wt 70 1 kg (154 lb 9 6 oz)   SpO2 96%   BMI 23 51 kg/m²     Physical Exam  Vitals reviewed  Constitutional:       General: He is not in acute distress  Appearance: He is not diaphoretic  HENT:      Head: Normocephalic and atraumatic  Right Ear: External ear normal       Left Ear: External ear normal       Nose: Nose normal       Mouth/Throat:      Mouth: Mucous membranes are moist       Pharynx: Oropharynx is clear  Eyes:      Extraocular Movements: Extraocular movements intact  Conjunctiva/sclera: Conjunctivae normal       Pupils: Pupils are equal, round, and reactive to light  Cardiovascular:      Rate and Rhythm: Normal rate and regular rhythm        Pulses: Normal pulses  Heart sounds: Normal heart sounds  No murmur heard  No friction rub  No gallop  Pulmonary:      Effort: Pulmonary effort is normal  No respiratory distress  Breath sounds: Normal breath sounds  No stridor  No wheezing, rhonchi or rales  Abdominal:      General: Bowel sounds are normal  There is no distension  Palpations: Abdomen is soft  There is no mass  Tenderness: There is no abdominal tenderness  There is no right CVA tenderness, left CVA tenderness or guarding  Musculoskeletal:         General: Normal range of motion  Cervical back: Normal range of motion and neck supple  Right lower leg: No edema  Left lower leg: No edema  Comments: Heberden's and Alem's nodes noted on bilateral hands   Lymphadenopathy:      Cervical: No cervical adenopathy  Skin:     General: Skin is warm and dry  Neurological:      General: No focal deficit present  Mental Status: He is alert and oriented to person, place, and time     Psychiatric:         Mood and Affect: Mood normal          Behavior: Behavior normal          Bhavna Coreas MD

## 2022-09-06 DIAGNOSIS — E78.5 HYPERLIPIDEMIA, UNSPECIFIED HYPERLIPIDEMIA TYPE: ICD-10-CM

## 2022-09-06 RX ORDER — ROSUVASTATIN CALCIUM 20 MG/1
TABLET, COATED ORAL
Qty: 180 TABLET | Refills: 0 | Status: SHIPPED | OUTPATIENT
Start: 2022-09-06

## 2022-10-06 ENCOUNTER — CLINICAL SUPPORT (OUTPATIENT)
Dept: FAMILY MEDICINE CLINIC | Facility: OTHER | Age: 72
End: 2022-10-06
Payer: MEDICARE

## 2022-10-06 DIAGNOSIS — Z23 ENCOUNTER FOR IMMUNIZATION: Primary | ICD-10-CM

## 2022-10-06 DIAGNOSIS — M19.042 ARTHRITIS OF FINGER OF LEFT HAND: ICD-10-CM

## 2022-10-06 PROCEDURE — G0008 ADMIN INFLUENZA VIRUS VAC: HCPCS

## 2022-10-06 PROCEDURE — 90662 IIV NO PRSV INCREASED AG IM: CPT

## 2022-12-05 DIAGNOSIS — N40.0 BENIGN PROSTATIC HYPERPLASIA, UNSPECIFIED WHETHER LOWER URINARY TRACT SYMPTOMS PRESENT: ICD-10-CM

## 2022-12-05 DIAGNOSIS — I10 BENIGN ESSENTIAL HYPERTENSION: ICD-10-CM

## 2022-12-05 DIAGNOSIS — E78.5 HYPERLIPIDEMIA, UNSPECIFIED HYPERLIPIDEMIA TYPE: ICD-10-CM

## 2022-12-05 RX ORDER — ROSUVASTATIN CALCIUM 20 MG/1
TABLET, COATED ORAL
Qty: 180 TABLET | Refills: 0 | Status: SHIPPED | OUTPATIENT
Start: 2022-12-05

## 2022-12-05 RX ORDER — AMLODIPINE BESYLATE 5 MG/1
TABLET ORAL
Qty: 90 TABLET | Refills: 1 | Status: SHIPPED | OUTPATIENT
Start: 2022-12-05

## 2022-12-05 RX ORDER — FINASTERIDE 5 MG/1
TABLET, FILM COATED ORAL
Qty: 45 TABLET | Refills: 1 | Status: SHIPPED | OUTPATIENT
Start: 2022-12-05

## 2022-12-06 ENCOUNTER — LAB (OUTPATIENT)
Dept: LAB | Facility: AMBULARY SURGERY CENTER | Age: 72
End: 2022-12-06

## 2022-12-06 DIAGNOSIS — R35.1 NOCTURIA: ICD-10-CM

## 2022-12-06 DIAGNOSIS — Z12.5 SCREENING FOR PROSTATE CANCER: ICD-10-CM

## 2022-12-07 LAB
PSA FREE MFR SERPL: 23.3 %
PSA FREE SERPL-MCNC: 0.07 NG/ML
PSA SERPL-MCNC: 0.3 NG/ML (ref 0–4)

## 2023-01-30 ENCOUNTER — OFFICE VISIT (OUTPATIENT)
Dept: FAMILY MEDICINE CLINIC | Facility: OTHER | Age: 73
End: 2023-01-30

## 2023-01-30 VITALS
SYSTOLIC BLOOD PRESSURE: 112 MMHG | DIASTOLIC BLOOD PRESSURE: 54 MMHG | TEMPERATURE: 98.2 F | WEIGHT: 147 LBS | RESPIRATION RATE: 15 BRPM | OXYGEN SATURATION: 96 % | HEIGHT: 68 IN | HEART RATE: 68 BPM | BODY MASS INDEX: 22.28 KG/M2

## 2023-01-30 DIAGNOSIS — M19.042 ARTHRITIS OF FINGER OF LEFT HAND: ICD-10-CM

## 2023-01-30 DIAGNOSIS — I10 BENIGN ESSENTIAL HYPERTENSION: ICD-10-CM

## 2023-01-30 DIAGNOSIS — R73.03 PREDIABETES: Primary | ICD-10-CM

## 2023-01-30 DIAGNOSIS — E78.5 HYPERLIPIDEMIA, UNSPECIFIED HYPERLIPIDEMIA TYPE: ICD-10-CM

## 2023-01-30 LAB — SL AMB POCT HEMOGLOBIN AIC: 6.1 (ref ?–6.5)

## 2023-01-30 RX ORDER — AMLODIPINE BESYLATE 2.5 MG/1
2.5 TABLET ORAL
Qty: 30 TABLET | Refills: 1 | Status: SHIPPED | OUTPATIENT
Start: 2023-01-30 | End: 2023-02-01

## 2023-01-30 RX ORDER — AMLODIPINE BESYLATE 2.5 MG/1
2.5 TABLET ORAL
Qty: 30 TABLET | Refills: 0 | Status: SHIPPED | OUTPATIENT
Start: 2023-01-30 | End: 2023-01-30

## 2023-01-30 NOTE — PATIENT INSTRUCTIONS
How to Take a Blood Pressure   AMBULATORY CARE:   Blood pressure (BP)  is the force of blood pushing on the walls of your arteries  Your BP results are written as 2 numbers  The first, or top, number is called systolic BP  This is the pressure caused by your heart pushing blood out to your body  The second, or bottom, number is called diastolic BP  This is the pressure when your heart relaxes and fills back up with blood  Ask your healthcare provider what your BP should be  For most people, a good BP goal is less than 120/80  Call your doctor if:   Your BP is higher or lower than you were told it should be  You have questions or concerns about your condition or care  Why you may need to take your BP:  You may not have any signs or symptoms of high BP  You may need to take your BP regularly to know how often your BP is high  High BP increases your risk for a stroke, heart attack, or kidney disease  You may need to take medicine to keep your BP at a normal level  Write down and keep a log of your BP  Your healthcare provider can use the BP results in your log to see if your BP medicines are working  How often to take your BP:  Your healthcare provider may recommend that you take your BP 2 times a day  Take your BP at the same times each day, such as the morning and evening  Ask your healthcare provider when and how often you should take your BP  How to take your BP:  You can take your BP at home with a digital BP monitor  Read the instructions that came with your BP monitor  The monitor comes with an adjustable cuff  Ask your healthcare provider if your cuff is the correct size  Do not eat, drink, smoke, or exercise for 30 minutes before you take your BP  Rest quietly for 5 minutes before you take your BP  Sit with your feet flat on the floor and your back against a chair  Extend your arm and support it on a flat surface  Your arm should be at the same level as your heart      Make sure all of the air is out of the cuff  Place the BP cuff against your bare skin about 1 inch (2 5 cm) above your elbow  Wrap the cuff snugly around your arm  The BP reading may not be correct if the cuff is too loose  If you are using a wrist cuff, wrap the cuff snugly around your wrist  Hold your wrist at the same level as your heart  Turn on the BP monitor and follow the directions  Write down your BP, the date, the time, and which arm you used to take the BP  Take your BP 2 times and write down both readings  Use the same arm each time  These BP readings can be 1 minute apart  What else you need to know:   Do not take a BP reading in an arm that is injured or has an IV or shunt  Take your BP medicines as directed  Do not stop taking your medicines if your BP is at your goal   A BP at your goal means your medicine is working correctly  Follow up with your doctor as directed:  Bring the log of your BP readings  Also bring the BP machine  Healthcare providers can check that you are using the machine correctly  Write down your questions so you remember to ask them during your visits  © Copyright [employer] 2022 Information is for End User's use only and may not be sold, redistributed or otherwise used for commercial purposes  All illustrations and images included in CareNotes® are the copyrighted property of A D A M , Inc  or [facility] Maryam Pacheco   The above information is an  only  It is not intended as medical advice for individual conditions or treatments  Talk to your doctor, nurse or pharmacist before following any medical regimen to see if it is safe and effective for you  DASH Eating Plan   AMBULATORY CARE:   The DASH (Dietary Approaches to Stop Hypertension) Eating Plan  is designed to help prevent or lower high blood pressure  It can also help to lower LDL (bad) cholesterol and decrease your risk for heart disease   The plan is low in sodium, sugar, unhealthy fats, and total fat  It is high in potassium, calcium, magnesium, and fiber  These nutrients are added when you eat more fruits, vegetables, and whole grains  With the DASH eating plan, you need to eat a certain number of servings from each food group  This will help you get enough of certain nutrients and limit others  The amount of servings you should eat depends on how many calories you need  Your dietitian can help you create meal plans with the right number of servings for each food group  What you need to know about sodium:  Your dietitian will tell you how much sodium is safe for you to have each day  People with high blood pressure should have no more than 1,500 to 2,300 mg of sodium in a day  A teaspoon (tsp) of salt has 2,300 mg of sodium  This may seem like a difficult goal, but small changes to the foods you eat can make a big difference  Your healthcare provider or dietitian can help you create a meal plan that follows your sodium limit  Read food labels  Food labels can help you choose foods that are low in sodium  The amount of sodium is listed in milligrams (mg)  The % Daily Value (DV) column tells you how much of your daily needs are met by 1 serving of the food for each nutrient listed  Choose foods that have less than 5% of the DV of sodium  These foods are considered low in sodium  Foods that have 20% or more of the DV of sodium are considered high in sodium  Avoid foods that have more than 300 mg of sodium in each serving  Choose foods that say low-sodium, reduced-sodium, or no salt added on the food label  Limit added salt  Do not salt food at the table if you add salt when you cook  Use herbs and spices, such as onions, garlic, and salt-free seasonings to add flavor  Try lemon or lime juice or vinegar to add a tart flavor  Use hot peppers or a small amount of hot pepper sauce to add a spicy flavor   Limit foods high in added salt, such as the following:    Seasonings made with salt, such as garlic salt, celery salt, onion salt, seasoned salt, meat tenderizers, and monosodium glutamate (MSG)    Miso soup and canned or dried soup mixes    Regular soy sauce, barbecue sauce, teriyaki sauce, steak sauce, Worcestershire sauce, and most flavored vinegars    Snack foods, such as salted chips, popcorn, pretzels, pork rinds, salted crackers, and salted nuts    Frozen foods, such as dinners, entrees, vegetables with sauces, and breaded meats    Ask about salt substitutes  Ask your healthcare provider if you may use salt substitutes  Some salt substitutes have ingredients that can be harmful if you have certain health conditions  Choose foods carefully at restaurants  Meals from restaurants, especially fast food restaurants, are often high in sodium  Some restaurants have nutrition information that tells you the amount of sodium in their foods  Ask to have your food prepared with less, or no salt  What you need to know about fats:  Healthy fats include unsaturated fats and omega-3 fatty acids  Unhealthy fats include saturated fats and trans fats    Include healthy fats, such as the following:      Cooking oils, such as soybean, canola, olive, or sunflower    Fatty fish, such as salmon, tuna, mackerel, or sardines    Flaxseed oil or ground flaxseed    ½ cup of cooked beans, such as black beans, kidney beans, or farley beans    1½ ounces of low-sodium nuts, such as almonds or walnuts    Low-sugar, low-sodium peanut butter    Seeds such as mt seeds or sunflower seeds       Limit or do not have unhealthy fats, such as the following:      Foods that contain fat from animals, such as fatty meats, whole milk, butter, and cream    Shortening, stick margarine, palm oil, and coconut oil    Full-fat or creamy salad dressing    Creamy soup    Crackers, chips, and baked goods made with margarine or shortening    Foods that are fried in unhealthy fats    Gravy and sauces, such as Clement or cheese sauces    What you need to know about carbohydrates (carbs): All carbs break down into sugar  Complex carbs contain more fiber than simple carbs  This means complex carbs go into the bloodstream more slowly and cause less of a blood sugar spike  Try to include more complex carbs and fewer simple carbs  Include complex carbs, such as the followin slice of whole-grain bread    1 ounce of dry cereal that does not contain added sugar    ½ cup of cooked oatmeal    2 ounces of cooked whole-grain pasta    ½ cup of cooked brown rice    Limit or do not have simple carbs, such as the following:      [employer], such as doughnuts, pastries, and cookies    Mixes for cornbread and biscuits    White rice and pasta mixes, such as boxed macaroni and cheese    Instant and cold cereals that contain sugar    Jelly, jam, and ice cream that contain sugar    Condiments such as ketchup    Drinks high in sugar, such as soft drinks, lemonade, and fruit juice    What you need to know about vegetables and fruits:  Vegetables and fruits can be fresh, frozen, or canned  If possible, try to choose low-sodium canned options    Include a variety of vegetables and fruits, such as the followin medium apple, pear, or peach (about ½ cup chopped)    ½ small banana    ½ cup berries, such as blueberries, strawberries, or blackberries    1 cup of raw leafy greens, such as lettuce, spinach, kale, or william greens    ½ cup of frozen or canned (no added salt) vegetables, such as green beans    ½ cup of fresh, frozen, or canned fruit (canned in light syrup or fruit juice)    ½ cup of vegetable or fruit juice    Limit or do not have vegetables and fruits made in the following ways:      Frozen fruit such as cherries that have added sugar    Fruit in cream or butter sauce    Canned vegetables that are high in sodium    Sauerkraut, pickled vegetables, and other foods prepared in brine    Fried vegetables or vegetables in butter or high-fat sauces    What you need to know about protein foods: Include lean or low-fat protein foods, such as the following:      Poultry (chicken, turkey) with no skin    Fish (especially fatty fish, such as salmon, fresh tuna, or mackerel)    Lean beef and pork (loin, round, extra lean hamburger)    Egg whites and egg substitutes    1 cup of nonfat (skim) or 1% milk    1½ ounces of fat-free or low-fat cheese    6 ounces of nonfat or low-fat yogurt    Limit or do not have high-fat protein foods, such as the following:      Smoked or cured meat, such as corned beef, cavanaugh, ham, hot dogs, and sausage    Canned beans and canned meats or spreads, such as potted meats, sardines, anchovies, and imitation seafood    Deli or lunch meats, such as bologna, ham, turkey, and roast beef    High-fat meat (T-bone steak, regular hamburger, and ribs)    Whole eggs and egg yolks    Whole milk, 2% milk, and cream    Regular cheese and processed cheese    Other guidelines to follow:   Maintain a healthy weight  Your risk for heart disease is higher if you are overweight  Your healthcare provider may suggest that you lose weight if you are overweight  You can lose weight by eating fewer calories and foods that have added sugars and fat  The DASH meal plan can help you do this  Decrease calories by eating smaller portions at each meal and fewer snacks  Ask your healthcare provider for more information about how to lose weight  Exercise regularly  Regular exercise can help you reach or maintain a healthy weight  Regular exercise can also help decrease your blood pressure and improve your cholesterol levels  Get 30 minutes or more of moderate exercise each day of the week  To lose weight, get at least 60 minutes of exercise  Talk to your healthcare provider about the best exercise program for you  Limit alcohol  Women should limit alcohol to 1 drink a day  Men should limit alcohol to 2 drinks a day   A drink of alcohol is 12 ounces of beer, 5 ounces of wine, or 1½ ounces of liquor  For more information:   [facility] [address]  [address] [address]  [place] [address]  Phone: [phone]  Web Address: [facility] no    © [facility] 2022 Information is for End User's use only and may not be sold, redistributed or otherwise used for commercial purposes  All illustrations and images included in CareNotes® are the copyrighted property of A D A M , Inc  or [address] [place], [place] [address]   The above information is an  only  It is not intended as medical advice for individual conditions or treatments  Talk to your doctor, nurse or pharmacist before following any medical regimen to see if it is safe and effective for you

## 2023-01-30 NOTE — PROGRESS NOTES
Name: Alexa Reyes      : 1950      MRN: [id number]  Encounter Provider: Macho De Leon MD  Encounter Date: 2023   Encounter department: [address] [place] [address]      1  Prediabetes  -     POCT hemoglobin A1c  -     Comprehensive metabolic panel; Future    2  Arthritis of finger of left hand  -     Diclofenac Sodium (VOLTAREN) 1 %; Apply 2 g topically 4 (four) times a day    3  Benign essential hypertension  -     amLODIPine (NORVASC) 2 5 mg tablet; Take 1 tablet (2 5 mg total) by mouth daily at bedtime  -     Comprehensive metabolic panel; Future    4  Hyperlipidemia, unspecified hyperlipidemia type  -     Lipid Panel with Direct LDL reflex; Future     BP is adequately controlled with occasional lightheadedness  Will attempt weaning amlodipine from 5 mg to 2 5 mg daily  If pressures remain controlled, will consider stopping medication at future visit  Recommended patient start checking BP at home and call office for any elevated pressures higher than 140/90  Also discussed DASH diet for improving BP without medication  Recheck CMP and lipid panel  Nutrition Assessment and Intervention:     New Nutrition Prescription completed with patient      Other interventions: DASH Diet      Subjective      Patient presents for follow-up concerning hypertension and hyperlipidemia  He states that he does not check his blood pressure at home but at recent office visits it has been relatively low  He denies any hypertensive symptoms but does endorse occasional slight lightheadedness when he bends over and stands back up  He asks if he could be weaned off of his medication  He also requests refill of diclofenac gel due to ongoing arthritis in his hands  Review of Systems   Eyes: Negative for visual disturbance  Respiratory: Negative for shortness of breath  Cardiovascular: Negative for chest pain and leg swelling  Musculoskeletal: Positive for arthralgias (Hands)  Neurological: Positive for light-headedness (Occasional, positional)  Negative for dizziness and headaches  All other systems reviewed and are negative  Current Outpatient Medications on File Prior to Visit   Medication Sig   • Ascorbic Acid (vitamin C) 1000 MG tablet Take 1,000 mg by mouth daily   • aspirin 81 mg chewable tablet Chew 81 mg daily at bedtime    • Cholecalciferol (Vitamin D-3) 25 MCG (1000 UT) CAPS Take 1 capsule by mouth   • Diclofenac Sodium (VOLTAREN) 1 % APPLY 2 GRAMS EXTERNALLY TO THE AFFECTED AREA FOUR TIMES DAILY   • diclofenac sodium (VOLTAREN) 50 mg EC tablet Take 2 tablets (100 mg total) by mouth 2 (two) times a day   • finasteride (PROSCAR) 5 mg tablet TAKE 1/2 TABLET(2 5 MG) BY MOUTH DAILY AT BEDTIME   • Multiple Vitamin (MULTIVITAMIN ADULT PO) Take 1 tablet by mouth daily   • rosuvastatin (CRESTOR) 20 MG tablet TAKE 2 TABLETS(40 MG) BY MOUTH DAILY AT BEDTIME   • Turmeric 400 MG CAPS Take 1 capsule by mouth daily   • [DISCONTINUED] amLODIPine (NORVASC) 5 mg tablet TAKE 1 TABLET(5 MG) BY MOUTH DAILY AT BEDTIME   • [DISCONTINUED] Diclofenac Sodium (VOLTAREN) 1 % Apply 2 g topically 4 (four) times a day       Objective     /54   Pulse 68   Temp 98 2 °F (36 8 °C)   Resp 15   Ht 5' 8" (1 727 m)   Wt 66 7 kg (147 lb)   SpO2 96%   BMI 22 35 kg/m²     Physical Exam  Vitals reviewed  Constitutional:       General: He is not in acute distress  Appearance: Normal appearance  He is well-developed  He is not diaphoretic  HENT:      Head: Normocephalic and atraumatic  Right Ear: External ear normal       Left Ear: External ear normal       Nose: Nose normal       Mouth/Throat:      Mouth: Mucous membranes are moist       Pharynx: Oropharynx is clear  Eyes:      Extraocular Movements: Extraocular movements intact  Conjunctiva/sclera: Conjunctivae normal       Pupils: Pupils are equal, round, and reactive to light     Cardiovascular:      Rate and Rhythm: Normal rate and regular rhythm  Pulses: Normal pulses  Heart sounds: Normal heart sounds  No murmur heard  No friction rub  No gallop  Pulmonary:      Effort: Pulmonary effort is normal  No respiratory distress  Breath sounds: Normal breath sounds  No stridor  No wheezing, rhonchi or rales  Abdominal:      General: Abdomen is flat  Bowel sounds are normal  There is no distension  Palpations: Abdomen is soft  Tenderness: There is no abdominal tenderness  There is no right CVA tenderness, left CVA tenderness or guarding  Musculoskeletal:         General: Normal range of motion  Cervical back: Normal range of motion and neck supple  Right lower leg: No edema  Left lower leg: No edema  Lymphadenopathy:      Cervical: No cervical adenopathy  Skin:     General: Skin is warm and dry  Findings: No rash  Neurological:      General: No focal deficit present  Mental Status: He is alert and oriented to person, place, and time     Psychiatric:         Mood and Affect: Mood normal          Behavior: Behavior normal        Alondra Mathew MD

## 2023-02-01 DIAGNOSIS — I10 BENIGN ESSENTIAL HYPERTENSION: ICD-10-CM

## 2023-02-01 RX ORDER — AMLODIPINE BESYLATE 2.5 MG/1
TABLET ORAL
Qty: 90 TABLET | Refills: 1 | Status: SHIPPED | OUTPATIENT
Start: 2023-02-01

## 2023-03-05 DIAGNOSIS — E78.5 HYPERLIPIDEMIA, UNSPECIFIED HYPERLIPIDEMIA TYPE: ICD-10-CM

## 2023-03-06 RX ORDER — ROSUVASTATIN CALCIUM 20 MG/1
TABLET, COATED ORAL
Qty: 180 TABLET | Refills: 0 | Status: SHIPPED | OUTPATIENT
Start: 2023-03-06

## 2023-03-16 ENCOUNTER — OFFICE VISIT (OUTPATIENT)
Dept: FAMILY MEDICINE CLINIC | Facility: OTHER | Age: 73
End: 2023-03-16

## 2023-03-16 VITALS
BODY MASS INDEX: 23.13 KG/M2 | DIASTOLIC BLOOD PRESSURE: 78 MMHG | SYSTOLIC BLOOD PRESSURE: 112 MMHG | TEMPERATURE: 97.8 F | RESPIRATION RATE: 18 BRPM | HEART RATE: 80 BPM | HEIGHT: 68 IN | OXYGEN SATURATION: 95 % | WEIGHT: 152.6 LBS

## 2023-03-16 DIAGNOSIS — I10 BENIGN ESSENTIAL HYPERTENSION: Primary | ICD-10-CM

## 2023-03-16 NOTE — PROGRESS NOTES
Name: Amara Antony      : 1950      MRN: [id number]  Encounter Provider: Anderson Mendoza MD  Encounter Date: 3/16/2023   Encounter department: [address] [place] [address] Dr Garsia  Benign essential hypertension  Assessment & Plan:  /78 at this visit  Home BP ranges 130s-140s/70s   -Continue amlodipine 2 5 mg PO daily  -Counseling provided on salt restriction/diet and exercise  -Advised patient to have blood work completed for previously ordered lipid panel and CMP             Depression Screening and Follow-up Plan: Patient was screened for depression during today's encounter  They screened negative with a PHQ-2 score of 0  Nutrition Assessment and Intervention:     New Nutrition Prescription completed with patient      Other interventions: DASH diet       Subjective      No acute concerns at this time  No refill medications needed at this time  BP at home ranges 130s-140s/70s  Hypertension  This is a chronic problem  The current episode started more than 1 year ago  The problem is unchanged  The problem is controlled  Pertinent negatives include no blurred vision, chest pain, headaches, orthopnea or shortness of breath  Agents associated with hypertension include NSAIDs  Risk factors for coronary artery disease include dyslipidemia and male gender  Past treatments include calcium channel blockers  The current treatment provides moderate improvement  There are no compliance problems  There is no history of CVA or retinopathy  Review of Systems   Constitutional: Negative for chills and fever  HENT: Negative for ear pain and sore throat  Eyes: Negative for blurred vision, pain and visual disturbance  Respiratory: Negative for chest tightness and shortness of breath  Cardiovascular: Negative for chest pain, orthopnea and leg swelling  Gastrointestinal: Negative for diarrhea, nausea and vomiting  Genitourinary: Negative for flank pain and frequency  Skin: Negative for rash  Neurological: Negative for dizziness, seizures, light-headedness and headaches  All other systems reviewed and are negative  Current Outpatient Medications on File Prior to Visit   Medication Sig   • amLODIPine (NORVASC) 2 5 mg tablet TAKE 1 TABLET(2 5 MG) BY MOUTH DAILY AT BEDTIME   • Ascorbic Acid (vitamin C) 1000 MG tablet Take 1,000 mg by mouth daily   • aspirin 81 mg chewable tablet Chew 81 mg daily at bedtime    • Cholecalciferol (Vitamin D-3) 25 MCG (1000 UT) CAPS Take 1 capsule by mouth   • Diclofenac Sodium (VOLTAREN) 1 % APPLY 2 GRAMS EXTERNALLY TO THE AFFECTED AREA FOUR TIMES DAILY   • Diclofenac Sodium (VOLTAREN) 1 % Apply 2 g topically 4 (four) times a day   • diclofenac sodium (VOLTAREN) 50 mg EC tablet Take 2 tablets (100 mg total) by mouth 2 (two) times a day   • finasteride (PROSCAR) 5 mg tablet TAKE 1/2 TABLET(2 5 MG) BY MOUTH DAILY AT BEDTIME   • Multiple Vitamin (MULTIVITAMIN ADULT PO) Take 1 tablet by mouth daily   • rosuvastatin (CRESTOR) 20 MG tablet TAKE 2 TABLETS(40 MG) BY MOUTH DAILY AT BEDTIME   • Turmeric 400 MG CAPS Take 1 capsule by mouth daily       Objective     /78   Pulse 80   Temp 97 8 °F (36 6 °C)   Resp 18   Ht 5' 8" (1 727 m)   Wt 69 2 kg (152 lb 9 6 oz)   SpO2 95%   BMI 23 20 kg/m²     Physical Exam  Vitals reviewed  Constitutional:       General: He is not in acute distress  Appearance: Normal appearance  He is well-developed and normal weight  He is not diaphoretic  HENT:      Head: Normocephalic and atraumatic  Right Ear: External ear normal       Left Ear: External ear normal       Nose: Nose normal       Mouth/Throat:      Mouth: Mucous membranes are moist       Pharynx: Oropharynx is clear  Eyes:      General: No scleral icterus  Extraocular Movements: Extraocular movements intact  Conjunctiva/sclera: Conjunctivae normal       Pupils: Pupils are equal, round, and reactive to light     Cardiovascular: Rate and Rhythm: Normal rate and regular rhythm  Pulses: Normal pulses  Heart sounds: Normal heart sounds  No murmur heard  No friction rub  No gallop  Pulmonary:      Effort: Pulmonary effort is normal  No respiratory distress  Breath sounds: Normal breath sounds  No stridor  No wheezing, rhonchi or rales  Abdominal:      General: Abdomen is flat  Bowel sounds are normal  There is no distension  Palpations: Abdomen is soft  There is no mass  Tenderness: There is no abdominal tenderness  There is no right CVA tenderness, left CVA tenderness or guarding  Musculoskeletal:         General: Normal range of motion  Cervical back: Normal range of motion and neck supple  Right lower leg: No edema  Left lower leg: No edema  Lymphadenopathy:      Cervical: No cervical adenopathy  Skin:     General: Skin is warm and dry  Findings: No rash  Neurological:      General: No focal deficit present  Mental Status: He is alert and oriented to person, place, and time     Psychiatric:         Mood and Affect: Mood normal          Behavior: Behavior normal        Evelyn Ching MD

## 2023-03-16 NOTE — PATIENT INSTRUCTIONS
DASH Eating Plan   AMBULATORY CARE:   The DASH (Dietary Approaches to Stop Hypertension) Eating Plan  is designed to help prevent or lower high blood pressure  It can also help to lower LDL (bad) cholesterol and decrease your risk for heart disease  The plan is low in sodium, sugar, unhealthy fats, and total fat  It is high in potassium, calcium, magnesium, and fiber  These nutrients are added when you eat more fruits, vegetables, and whole grains  With the DASH eating plan, you need to eat a certain number of servings from each food group  This will help you get enough of certain nutrients and limit others  The amount of servings you should eat depends on how many calories you need  Your dietitian can help you create meal plans with the right number of servings for each food group  What you need to know about sodium:  Your dietitian will tell you how much sodium is safe for you to have each day  People with high blood pressure should have no more than 1,500 to 2,300 mg of sodium in a day  A teaspoon (tsp) of salt has 2,300 mg of sodium  This may seem like a difficult goal, but small changes to the foods you eat can make a big difference  Your healthcare provider or dietitian can help you create a meal plan that follows your sodium limit  Read food labels  Food labels can help you choose foods that are low in sodium  The amount of sodium is listed in milligrams (mg)  The % Daily Value (DV) column tells you how much of your daily needs are met by 1 serving of the food for each nutrient listed  Choose foods that have less than 5% of the DV of sodium  These foods are considered low in sodium  Foods that have 20% or more of the DV of sodium are considered high in sodium  Avoid foods that have more than 300 mg of sodium in each serving  Choose foods that say low-sodium, reduced-sodium, or no salt added on the food label  Limit added salt  Do not salt food at the table if you add salt when you cook   Use herbs and spices, such as onions, garlic, and salt-free seasonings to add flavor  Try lemon or lime juice or vinegar to add a tart flavor  Use hot peppers or a small amount of hot pepper sauce to add a spicy flavor  Limit foods high in added salt, such as the following:    Seasonings made with salt, such as garlic salt, celery salt, onion salt, seasoned salt, meat tenderizers, and monosodium glutamate (MSG)    Miso soup and canned or dried soup mixes    Regular soy sauce, barbecue sauce, teriyaki sauce, steak sauce, Worcestershire sauce, and most flavored vinegars    Snack foods, such as salted chips, popcorn, pretzels, pork rinds, salted crackers, and salted nuts    Frozen foods, such as dinners, entrees, vegetables with sauces, and breaded meats    Ask about salt substitutes  Ask your healthcare provider if you may use salt substitutes  Some salt substitutes have ingredients that can be harmful if you have certain health conditions  Choose foods carefully at restaurants  Meals from restaurants, especially fast food restaurants, are often high in sodium  Some restaurants have nutrition information that tells you the amount of sodium in their foods  Ask to have your food prepared with less, or no salt  What you need to know about fats:  Healthy fats include unsaturated fats and omega-3 fatty acids  Unhealthy fats include saturated fats and trans fats    Include healthy fats, such as the following:      Cooking oils, such as soybean, canola, olive, or sunflower    Fatty fish, such as salmon, tuna, mackerel, or sardines    Flaxseed oil or ground flaxseed    ½ cup of cooked beans, such as black beans, kidney beans, or farley beans    1½ ounces of low-sodium nuts, such as almonds or walnuts    Low-sugar, low-sodium peanut butter    Seeds such as mt seeds or sunflower seeds       Limit or do not have unhealthy fats, such as the following:      Foods that contain fat from animals, such as fatty meats, whole milk, butter, and cream    Shortening, stick margarine, palm oil, and coconut oil    Full-fat or creamy salad dressing    Creamy soup    Crackers, chips, and baked goods made with margarine or shortening    Foods that are fried in unhealthy fats    Gravy and sauces, such as Clement or cheese sauces    What you need to know about carbohydrates (carbs): All carbs break down into sugar  Complex carbs contain more fiber than simple carbs  This means complex carbs go into the bloodstream more slowly and cause less of a blood sugar spike  Try to include more complex carbs and fewer simple carbs  Include complex carbs, such as the followin slice of whole-grain bread    1 ounce of dry cereal that does not contain added sugar    ½ cup of cooked oatmeal    2 ounces of cooked whole-grain pasta    ½ cup of cooked brown rice    Limit or do not have simple carbs, such as the following:      [employer], such as doughnuts, pastries, and cookies    Mixes for cornbread and biscuits    White rice and pasta mixes, such as boxed macaroni and cheese    Instant and cold cereals that contain sugar    Jelly, jam, and ice cream that contain sugar    Condiments such as ketchup    Drinks high in sugar, such as soft drinks, lemonade, and fruit juice    What you need to know about vegetables and fruits:  Vegetables and fruits can be fresh, frozen, or canned  If possible, try to choose low-sodium canned options    Include a variety of vegetables and fruits, such as the followin medium apple, pear, or peach (about ½ cup chopped)    ½ small banana    ½ cup berries, such as blueberries, strawberries, or blackberries    1 cup of raw leafy greens, such as lettuce, spinach, kale, or william greens    ½ cup of frozen or canned (no added salt) vegetables, such as green beans    ½ cup of fresh, frozen, or canned fruit (canned in light syrup or fruit juice)    ½ cup of vegetable or fruit juice    Limit or do not have vegetables and fruits made in the following ways:      Frozen fruit such as cherries that have added sugar    Fruit in cream or butter sauce    Canned vegetables that are high in sodium    Sauerkraut, pickled vegetables, and other foods prepared in brine    Fried vegetables or vegetables in butter or high-fat sauces    What you need to know about protein foods: Include lean or low-fat protein foods, such as the following:      Poultry (chicken, turkey) with no skin    Fish (especially fatty fish, such as salmon, fresh tuna, or mackerel)    Lean beef and pork (loin, round, extra lean hamburger)    Egg whites and egg substitutes    1 cup of nonfat (skim) or 1% milk    1½ ounces of fat-free or low-fat cheese    6 ounces of nonfat or low-fat yogurt    Limit or do not have high-fat protein foods, such as the following:      Smoked or cured meat, such as corned beef, cavanaugh, ham, hot dogs, and sausage    Canned beans and canned meats or spreads, such as potted meats, sardines, anchovies, and imitation seafood    Deli or lunch meats, such as bologna, ham, turkey, and roast beef    High-fat meat (T-bone steak, regular hamburger, and ribs)    Whole eggs and egg yolks    Whole milk, 2% milk, and cream    Regular cheese and processed cheese    Other guidelines to follow:   Maintain a healthy weight  Your risk for heart disease is higher if you are overweight  Your healthcare provider may suggest that you lose weight if you are overweight  You can lose weight by eating fewer calories and foods that have added sugars and fat  The DASH meal plan can help you do this  Decrease calories by eating smaller portions at each meal and fewer snacks  Ask your healthcare provider for more information about how to lose weight  Exercise regularly  Regular exercise can help you reach or maintain a healthy weight  Regular exercise can also help decrease your blood pressure and improve your cholesterol levels   Get 30 minutes or more of moderate exercise each day of the week  To lose weight, get at least 60 minutes of exercise  Talk to your healthcare provider about the best exercise program for you  Limit alcohol  Women should limit alcohol to 1 drink a day  Men should limit alcohol to 2 drinks a day  A drink of alcohol is 12 ounces of beer, 5 ounces of wine, or 1½ ounces of liquor  For more information:   [employer] [facility] [address]  [id number]  [place] [address]  Phone: [phone]  Web Address: [facility] no    © Copyright [employer] 2022 Information is for End User's use only and may not be sold, redistributed or otherwise used for commercial purposes  The above information is an  only  It is not intended as medical advice for individual conditions or treatments  Talk to your doctor, nurse or pharmacist before following any medical regimen to see if it is safe and effective for you  How to Take a Blood Pressure Reading   AMBULATORY CARE:   Blood pressure (BP)  is the force of blood pushing on the walls of your arteries  Your BP results are written as 2 numbers  The first, or top, number is called systolic BP  This is the pressure caused by your heart pushing blood out to your body  The second, or bottom, number is called diastolic BP  This is the pressure when your heart relaxes and fills back up with blood  Ask your healthcare provider what your BP should be  For most people, a good BP goal is less than 120/80  Call your doctor if:   Your BP is higher or lower than you were told it should be  You have questions or concerns about your condition or care  Why you may need to take BP readings: You may not have any signs or symptoms of high BP  You may need to take BP readings regularly to know how often your BP is high  High BP increases your risk for a stroke, heart attack, or kidney disease   You may need to take medicine to keep your BP at a normal level  Write down and keep a log of your BP readings  Your healthcare provider can use the results to see if your BP medicines are working  How often to take BP readings: Your healthcare provider may recommend that you take BP readings 2 times a day  Take your BP at the same times each day, such as the morning and evening  How to take BP readings: You can take BP readings at home with a digital BP monitor  Read the instructions that came with your BP monitor  The monitor comes with an adjustable cuff  Ask your healthcare provider if your cuff is the correct size  Do not eat, drink, smoke, or exercise for 30 minutes before you take BP readings  Rest quietly for 5 minutes before you begin  Do not talk while you take BP readings  Sit with your feet flat on the floor and your back against a chair  Put your arm straight out and support it on a flat surface  Your arm should be at chest level  Do not move your arm while you take your BP readings  Make sure all of the air is out of the cuff  Place the BP cuff against your bare skin about 1 inch (2 5 cm) above your elbow  Wrap the cuff snugly around your arm  The BP reading may not be correct if the cuff is too loose  If you are using a wrist cuff, wrap the cuff snugly around your wrist  Hold your wrist at the same level as your heart  Turn on the BP monitor and follow the directions  Write down your BP, the date, the time, and which arm you used to take BP reading  Take 2 BP readings and write down both results  Use the same arm each time  These BP readings can be 1 minute apart  What else you need to know:   Do not take a BP reading in an arm that is injured or has an IV or shunt  The reading may not be accurate  Do not stop taking your medicines if your BP is at your goal   A BP at your goal means your medicine is working correctly  Take your BP medicines as directed      Follow up with your doctor as directed:  Bring the log of your BP readings  Also bring the BP machine  Healthcare providers can check that you are using the machine correctly  Write down your questions so you remember to ask them during your visits  © Copyright [employer] 2022 Information is for End User's use only and may not be sold, redistributed or otherwise used for commercial purposes  The above information is an  only  It is not intended as medical advice for individual conditions or treatments  Talk to your doctor, nurse or pharmacist before following any medical regimen to see if it is safe and effective for you

## 2023-03-17 NOTE — ASSESSMENT & PLAN NOTE
/78 at this visit   Home BP ranges 130s-140s/70s   -Continue amlodipine 2 5 mg PO daily  -Counseling provided on salt restriction/diet and exercise  -Advised patient to have blood work completed for previously ordered lipid panel and CMP

## 2023-03-30 ENCOUNTER — HOSPITAL ENCOUNTER (OUTPATIENT)
Dept: ULTRASOUND IMAGING | Facility: HOSPITAL | Age: 73
Discharge: HOME | End: 2023-03-30

## 2023-03-30 DIAGNOSIS — Z13.6 SCREENING FOR AAA (ABDOMINAL AORTIC ANEURYSM): ICD-10-CM

## 2023-04-20 ENCOUNTER — APPOINTMENT (OUTPATIENT)
Dept: LAB | Facility: AMBULARY SURGERY CENTER | Age: 73
End: 2023-04-20

## 2023-04-20 DIAGNOSIS — E78.5 HYPERLIPIDEMIA, UNSPECIFIED HYPERLIPIDEMIA TYPE: ICD-10-CM

## 2023-04-20 DIAGNOSIS — I10 BENIGN ESSENTIAL HYPERTENSION: ICD-10-CM

## 2023-04-20 DIAGNOSIS — R73.03 PREDIABETES: ICD-10-CM

## 2023-04-20 LAB
ALBUMIN SERPL BCP-MCNC: 3.6 G/DL (ref 3.5–5)
ALP SERPL-CCNC: 46 U/L (ref 46–116)
ALT SERPL W P-5'-P-CCNC: 26 U/L (ref 12–78)
ANION GAP SERPL CALCULATED.3IONS-SCNC: 1 MMOL/L (ref 4–13)
AST SERPL W P-5'-P-CCNC: 21 U/L (ref 5–45)
BILIRUB SERPL-MCNC: 0.45 MG/DL (ref 0.2–1)
BUN SERPL-MCNC: 18 MG/DL (ref 5–25)
CALCIUM SERPL-MCNC: 9 MG/DL (ref 8.3–10.1)
CHLORIDE SERPL-SCNC: 109 MMOL/L (ref 96–108)
CHOLEST SERPL-MCNC: 139 MG/DL
CO2 SERPL-SCNC: 28 MMOL/L (ref 21–32)
CREAT SERPL-MCNC: 1.13 MG/DL (ref 0.6–1.3)
GFR SERPL CREATININE-BSD FRML MDRD: 64 ML/MIN/1.73SQ M
GLUCOSE P FAST SERPL-MCNC: 131 MG/DL (ref 65–99)
HDLC SERPL-MCNC: 48 MG/DL
LDLC SERPL CALC-MCNC: 79 MG/DL (ref 0–100)
POTASSIUM SERPL-SCNC: 4 MMOL/L (ref 3.5–5.3)
PROT SERPL-MCNC: 6.6 G/DL (ref 6.4–8.4)
SODIUM SERPL-SCNC: 138 MMOL/L (ref 135–147)
TRIGL SERPL-MCNC: 61 MG/DL

## 2023-04-26 ENCOUNTER — HOSPITAL ENCOUNTER (OUTPATIENT)
Dept: PULMONOLOGY | Facility: HOSPITAL | Age: 73
Discharge: HOME | End: 2023-04-26

## 2023-04-26 DIAGNOSIS — R91.8 PULMONARY NODULES: ICD-10-CM

## 2023-04-26 DIAGNOSIS — Z87.891 HISTORY OF SMOKING: ICD-10-CM

## 2023-04-26 RX ORDER — ALBUTEROL SULFATE 2.5 MG/3ML
2.5 SOLUTION RESPIRATORY (INHALATION) ONCE
Status: COMPLETED | OUTPATIENT
Start: 2023-04-26 | End: 2023-04-26

## 2023-04-26 RX ADMIN — ALBUTEROL SULFATE 2.5 MG: 2.5 SOLUTION RESPIRATORY (INHALATION) at 09:47

## 2023-05-08 ENCOUNTER — OFFICE VISIT (OUTPATIENT)
Dept: PULMONOLOGY | Facility: CLINIC | Age: 73
End: 2023-05-08

## 2023-05-08 VITALS
WEIGHT: 150 LBS | HEART RATE: 70 BPM | DIASTOLIC BLOOD PRESSURE: 70 MMHG | BODY MASS INDEX: 22.73 KG/M2 | HEIGHT: 68 IN | RESPIRATION RATE: 18 BRPM | SYSTOLIC BLOOD PRESSURE: 110 MMHG | OXYGEN SATURATION: 97 % | TEMPERATURE: 97.4 F

## 2023-05-08 DIAGNOSIS — R91.8 PULMONARY NODULES: Primary | ICD-10-CM

## 2023-05-08 DIAGNOSIS — J30.1 SEASONAL ALLERGIC RHINITIS DUE TO POLLEN: ICD-10-CM

## 2023-05-08 DIAGNOSIS — Z77.090 ASBESTOS EXPOSURE: ICD-10-CM

## 2023-05-08 NOTE — ASSESSMENT & PLAN NOTE
CT of chest completed on 4/25/2022 showed a 5 x 3 mm nodule in the right upper lobe  It was recommended at the time that he have a follow-up CT of chest in 12 months by the radiologist   I have ordered this today  He has a history of asbestos exposure as well as a 10 pack year smoking history

## 2023-05-08 NOTE — PROGRESS NOTES
Pulmonary Follow Up Note   Sylvia Heredia 68 y o  male MRN: [id number]  5/8/2023      Assessment/Plan:     Allergic rhinitis  This has been mild and patient is taking Claritin daily  He has used Flonase in the past and felt relief of symptoms, I suggested he start using this daily again  Pulmonary nodules  CT of chest completed on 4/25/2022 showed a 5 x 3 mm nodule in the right upper lobe  It was recommended at the time that he have a follow-up CT of chest in 12 months by the radiologist   I have ordered this today  He has a history of asbestos exposure as well as a 10 pack year smoking history  Asbestos exposure  On his last CT of chest completed on 4/25/2022, patient had a 5 x 3 mm nodule in the right upper lobe  I have ordered a follow-up CT today, as per request by the radiologist at that time  Visit orders:    Problem List Items Addressed This Visit        Respiratory    Allergic rhinitis     This has been mild and patient is taking Claritin daily  He has used Flonase in the past and felt relief of symptoms, I suggested he start using this daily again  Pulmonary nodules - Primary     CT of chest completed on 4/25/2022 showed a 5 x 3 mm nodule in the right upper lobe  It was recommended at the time that he have a follow-up CT of chest in 12 months by the radiologist   I have ordered this today  He has a history of asbestos exposure as well as a 10 pack year smoking history  Relevant Orders    CT chest wo contrast       Other    Asbestos exposure     On his last CT of chest completed on 4/25/2022, patient had a 5 x 3 mm nodule in the right upper lobe  I have ordered a follow-up CT today, as per request by the radiologist at that time  Return in about 1 year (around 5/8/2024), or if symptoms worsen or fail to improve      History of Present Illness   HPI:  Sylvia Heredia is a 68 y o  male who presents to the office today for 1 year follow up regarding pulmonary nodules, prior tobacco abuse, and asbestos exposure  He had PFTs and 6 minute walk test completed on 4/26/2023 which were within normal limits  His last CT of chest was completed on 4/25/2022 which showed a 5 x 3 mm nodule in the right upper lobe and a small linear area of scarring seen in the right upper lobe posteriorly  It was recommended at that time patient have a follow-up CT in 12 months  At today's visit, patient has no respiratory complaints, just a slight cough that he attributes to seasonal allergies  He has been taking Claritin daily  Review of Systems   Constitutional: Negative for activity change, appetite change, chills, fatigue and fever  HENT: Positive for congestion  Negative for ear pain, postnasal drip, rhinorrhea, sneezing, sore throat and trouble swallowing  Respiratory: Positive for cough  Negative for chest tightness, shortness of breath and wheezing  Cardiovascular: Negative for chest pain  Musculoskeletal: Positive for arthralgias  Negative for myalgias  Allergic/Immunologic: Positive for environmental allergies  Neurological: Negative for headaches  All other systems reviewed and are negative         Medical, Family and Social history reviewed and updated as appropriate    Historical Information   Past Medical History:   Diagnosis Date   • Hernia, inguinal, right 2/24/2021   • Hypertension    • Irregular heart beat    • Lung nodule      Past Surgical History:   Procedure Laterality Date   • BUNIONECTOMY Right    • COLONOSCOPY     • HAND SURGERY Right     cyst excision   • LA RPR 1ST INGUN HRNA AGE 5 YRS/> REDUCIBLE Right 3/15/2021    Procedure: INGUINAL HERNIA REPAIR;  Surgeon: Chantelle Davis DO;  Location: [facility];  Service: General   • SHOULDER SURGERY Right 2018     Family History   Problem Relation Age of Onset   • Diabetes Mother    • Heart attack Father        Social History     Tobacco Use   Smoking Status Former   • Packs/day: 1 00   • Years: 10 00 "  • Pack years: 10 00   • Types: Cigarettes   • Quit date:    • Years since quittin 3   Smokeless Tobacco Never         Meds/Allergies     Current Outpatient Medications:   •  amLODIPine (NORVASC) 2 5 mg tablet, TAKE 1 TABLET(2 5 MG) BY MOUTH DAILY AT BEDTIME, Disp: 90 tablet, Rfl: 1  •  Ascorbic Acid (vitamin C) 1000 MG tablet, Take 1,000 mg by mouth daily, Disp: , Rfl:   •  aspirin 81 mg chewable tablet, Chew 81 mg daily at bedtime , Disp: , Rfl:   •  Cholecalciferol (Vitamin D-3) 25 MCG (1000 UT) CAPS, Take 1 capsule by mouth, Disp: , Rfl:   •  Diclofenac Sodium (VOLTAREN) 1 %, APPLY 2 GRAMS EXTERNALLY TO THE AFFECTED AREA FOUR TIMES DAILY, Disp: , Rfl:   •  Diclofenac Sodium (VOLTAREN) 1 %, Apply 2 g topically 4 (four) times a day, Disp: 350 g, Rfl: 2  •  diclofenac sodium (VOLTAREN) 50 mg EC tablet, Take 2 tablets (100 mg total) by mouth 2 (two) times a day, Disp: 120 tablet, Rfl: 0  •  finasteride (PROSCAR) 5 mg tablet, TAKE 1/2 TABLET(2 5 MG) BY MOUTH DAILY AT BEDTIME, Disp: 45 tablet, Rfl: 1  •  Multiple Vitamin (MULTIVITAMIN ADULT PO), Take 1 tablet by mouth daily, Disp: , Rfl:   •  rosuvastatin (CRESTOR) 20 MG tablet, TAKE 2 TABLETS(40 MG) BY MOUTH DAILY AT BEDTIME, Disp: 180 tablet, Rfl: 0  •  Turmeric 400 MG CAPS, Take 1 capsule by mouth daily, Disp: , Rfl:   No Known Allergies    Vitals: Blood pressure 110/70, pulse 70, temperature (!) 97 4 °F (36 3 °C), temperature source Tympanic, resp  rate 18, height 5' 8\" (1 727 m), weight 68 kg (150 lb), SpO2 97 %  Body mass index is 22 81 kg/m²  Oxygen Therapy  SpO2: 97 %  Oxygen Therapy: None (Room air)      Physical Exam  Vitals reviewed  Constitutional:       Appearance: Normal appearance  HENT:      Head: Normocephalic and atraumatic  Nose: Congestion present  Mouth/Throat:      Pharynx: Oropharynx is clear  Eyes:      Conjunctiva/sclera: Conjunctivae normal    Cardiovascular:      Rate and Rhythm: Normal rate and regular rhythm        " Heart sounds: Normal heart sounds  Pulmonary:      Effort: Pulmonary effort is normal  No tachypnea, accessory muscle usage, respiratory distress or retractions  Breath sounds: No stridor or decreased air movement  No decreased breath sounds, wheezing, rhonchi or rales  Chest:      Chest wall: No tenderness  Abdominal:      Palpations: Abdomen is soft  Musculoskeletal:         General: Normal range of motion  Cervical back: Normal range of motion  Skin:     General: Skin is warm and dry  Neurological:      General: No focal deficit present  Mental Status: He is alert and oriented to person, place, and time  Psychiatric:         Mood and Affect: Mood normal          Behavior: Behavior normal          Labs: I have personally reviewed pertinent lab results  Lab Results   Component Value Date    WBC 5 85 03/03/2021    HGB 14 4 03/03/2021    HCT 44 6 03/03/2021    MCV 92 03/03/2021     03/03/2021     Lab Results   Component Value Date    CALCIUM 9 0 04/20/2023    K 4 0 04/20/2023    CO2 28 04/20/2023     (H) 04/20/2023    BUN 18 04/20/2023    CREATININE 1 13 04/20/2023     No results found for: IGE  Lab Results   Component Value Date    ALT 26 04/20/2023    AST 21 04/20/2023    ALKPHOS 46 04/20/2023       Imaging and other studies: I have personally reviewed pertinent reports  and I have personally reviewed pertinent films in PACS     CT chest 4/25/2022: 5x3mm pulmonary nodule in RUL  Consider short-term follow-up in 12 months  Small linear area of scarring in the right upper lobe posteriorly  Pulmonary function testing:  Performed 4/26/2023 and personally interpreted  FEV1/FVC ratio 75%   FEV1 95% predicted  FVC 92% predicted  No significant response to bronchodilators  TLC 81 % predicted  RV 55 % predicted  DLCO corrected for hemoglobin 82 % predicted  Normal spirometry with no obstruction  Normal diffusion capacity   Abnormal postbronchodilator flow volume loop due to cough but probably normal prebronchodilator    6-minute walk test without desaturation- Pulse ox 99% on RA with HR 81 at rest  Patient able to walk 6minues/360M without stopping with pulse ox remaining at 97-98% on RA and HR 91  Other Studies: I have personally reviewed pertinent reports        Answers for HPI/ROS submitted by the patient on 5/2/2023  Since you first noticed this problem, how has it changed?: unchanged  Do you have shortness of breath that occurs with effort or exertion?: No  Do you have ear congestion?: No  Do you have heartburn?: No  Do you have fatigue?: No  Do you have nasal congestion?: No  Do you have shortness of breath when lying flat?: No  Do you have shortness of breath when you wake up?: No  Do you have sweats?: No  Have you experienced weight loss?: No  Which of the following makes your symptoms worse?: nothing  Which of the following makes your symptoms better?: nothing

## 2023-05-08 NOTE — ASSESSMENT & PLAN NOTE
This has been mild and patient is taking Claritin daily  He has used Flonase in the past and felt relief of symptoms, I suggested he start using this daily again

## 2023-05-08 NOTE — ASSESSMENT & PLAN NOTE
On his last CT of chest completed on 4/25/2022, patient had a 5 x 3 mm nodule in the right upper lobe  I have ordered a follow-up CT today, as per request by the radiologist at that time

## 2023-06-03 DIAGNOSIS — N40.0 BENIGN PROSTATIC HYPERPLASIA, UNSPECIFIED WHETHER LOWER URINARY TRACT SYMPTOMS PRESENT: ICD-10-CM

## 2023-06-03 DIAGNOSIS — E78.5 HYPERLIPIDEMIA, UNSPECIFIED HYPERLIPIDEMIA TYPE: ICD-10-CM

## 2023-06-05 RX ORDER — FINASTERIDE 5 MG/1
TABLET, FILM COATED ORAL
Qty: 45 TABLET | Refills: 1 | Status: SHIPPED | OUTPATIENT
Start: 2023-06-05

## 2023-06-05 RX ORDER — ROSUVASTATIN CALCIUM 20 MG/1
TABLET, COATED ORAL
Qty: 180 TABLET | Refills: 0 | Status: SHIPPED | OUTPATIENT
Start: 2023-06-05

## 2023-06-26 DIAGNOSIS — M19.90 ARTHRITIS: ICD-10-CM

## 2023-06-26 DIAGNOSIS — E78.5 HYPERLIPIDEMIA, UNSPECIFIED HYPERLIPIDEMIA TYPE: ICD-10-CM

## 2023-06-26 DIAGNOSIS — I10 BENIGN ESSENTIAL HYPERTENSION: ICD-10-CM

## 2023-06-26 RX ORDER — ROSUVASTATIN CALCIUM 20 MG/1
20 TABLET, COATED ORAL DAILY
Qty: 180 TABLET | Refills: 0 | Status: SHIPPED | OUTPATIENT
Start: 2023-06-26 | End: 2023-07-05 | Stop reason: SDUPTHER

## 2023-06-26 RX ORDER — AMLODIPINE BESYLATE 2.5 MG/1
2.5 TABLET ORAL DAILY
Qty: 90 TABLET | Refills: 1 | Status: SHIPPED | OUTPATIENT
Start: 2023-06-26

## 2023-07-05 DIAGNOSIS — E78.5 HYPERLIPIDEMIA, UNSPECIFIED HYPERLIPIDEMIA TYPE: ICD-10-CM

## 2023-07-05 RX ORDER — ROSUVASTATIN CALCIUM 20 MG/1
40 TABLET, COATED ORAL DAILY
Qty: 180 TABLET | Refills: 1 | Status: SHIPPED | OUTPATIENT
Start: 2023-07-05

## 2023-07-05 NOTE — TELEPHONE ENCOUNTER
Please advise. It says to take 1 tablet daily in the chart. He would also like to know if he can have labs ordered and completed prior to [facility] appt coming up?

## 2023-07-12 DIAGNOSIS — M19.90 ARTHRITIS: ICD-10-CM

## 2023-08-23 ENCOUNTER — OFFICE VISIT (OUTPATIENT)
Dept: FAMILY MEDICINE CLINIC | Facility: OTHER | Age: 73
End: 2023-08-23
Payer: MEDICARE

## 2023-08-23 VITALS
RESPIRATION RATE: 14 BRPM | SYSTOLIC BLOOD PRESSURE: 152 MMHG | HEART RATE: 65 BPM | HEIGHT: 68 IN | OXYGEN SATURATION: 97 % | TEMPERATURE: 98.4 F | DIASTOLIC BLOOD PRESSURE: 82 MMHG | WEIGHT: 152 LBS | BODY MASS INDEX: 23.04 KG/M2

## 2023-08-23 DIAGNOSIS — R73.03 PREDIABETES: ICD-10-CM

## 2023-08-23 DIAGNOSIS — Z00.00 MEDICARE ANNUAL WELLNESS VISIT, SUBSEQUENT: Primary | ICD-10-CM

## 2023-08-23 DIAGNOSIS — R35.1 NOCTURIA: ICD-10-CM

## 2023-08-23 DIAGNOSIS — G57.02 PIRIFORMIS SYNDROME OF LEFT SIDE: ICD-10-CM

## 2023-08-23 DIAGNOSIS — Z12.5 SCREENING FOR PROSTATE CANCER: ICD-10-CM

## 2023-08-23 DIAGNOSIS — Z13.6 SCREENING FOR CARDIOVASCULAR CONDITION: ICD-10-CM

## 2023-08-23 LAB — SL AMB POCT HEMOGLOBIN AIC: 5.8 (ref ?–6.5)

## 2023-08-23 PROCEDURE — G0439 PPPS, SUBSEQ VISIT: HCPCS

## 2023-08-23 PROCEDURE — 83036 HEMOGLOBIN GLYCOSYLATED A1C: CPT

## 2023-08-23 NOTE — ASSESSMENT & PLAN NOTE
- shooting pain from back/buttocks region down to his knee most likely consistent with piriformis syndrome   - advised to continue taking diclofenac 100 mg total per day, will check CMP to assess kidney function and informed patient about how this medicine can lead to poor kidney function or GI bleeding over time  - advised to schedule an appointment with one of our DO physicians in the office to receive OMT for possible piriformis syndrome/sciatica  - can use heat as needed for further relief  - educated patient on several stretches that he could do as well

## 2023-08-23 NOTE — ASSESSMENT & PLAN NOTE
- A1C from 1/2023 was 6.1  - CMP from 4/2023 showed elevated fasting glucose of 131  - rechecked A1C today and was 5.8 in office  - advised to continue reducing simple carbohydrate foods, lessen sweets, and increase vegetable and fiber intake

## 2023-08-23 NOTE — PROGRESS NOTES
Assessment and Plan:     Problem List Items Addressed This Visit        Nervous and Auditory    Piriformis syndrome of left side     - shooting pain from back/buttocks region down to his knee most likely consistent with piriformis syndrome   - advised to continue taking diclofenac 100 mg total per day, will check CMP to assess kidney function and informed patient about how this medicine can lead to poor kidney function or GI bleeding over time  - advised to schedule an appointment with one of our DO physicians in the office to receive OMT for possible piriformis syndrome/sciatica  - can use heat as needed for further relief  - educated patient on several stretches that he could do as well            Other    Prediabetes     - A1C from 1/2023 was 6.1  - CMP from 4/2023 showed elevated fasting glucose of 131  - rechecked A1C today and was 5.8 in office  - advised to continue reducing simple carbohydrate foods, lessen sweets, and increase vegetable and fiber intake         Relevant Orders    POCT hemoglobin A1c (Completed)    Comprehensive metabolic panel    Nocturia     - nocturia could be related to BPH  - last PSA was 0.3 in 12/2022  - discussed with patient about current PSA recommendations and shared decision making was used, patient would like to still monitor PSA         Relevant Orders    PSA, Total Screen   Other Visit Diagnoses     Medicare annual wellness visit, subsequent    -  Primary    Screening for prostate cancer        Relevant Orders    PSA, Total Screen    Screening for cardiovascular condition        - last lipid panel 4/23 unremarkable put pt reports self reduced cholesterol medicine 3 weeks ago so would like to recheck lipids with medication reduction     Relevant Orders    Lipid Panel with Direct LDL reflex    Comprehensive metabolic panel              Preventive health issues were discussed with patient, and age appropriate screening tests were ordered as noted in patient's After Visit Summary. Personalized health advice and appropriate referrals for health education or preventive services given if needed, as noted in patient's After Visit Summary. Nutrition Assessment and Intervention:     Reviewed and updated Nutrition Prescription      Physical Activity Assessment and Intervention:    Physical Activity Prescription completed with patient      Emotional and Mental Well-being, Sleep, Connectedness Assessment and Intervention:    Sleep/stress assessment performed           History of Present Illness:       HPI     The patient is a 69 yo M with PMHx of pulmonary nodules, HTN, and HLD who present today for a medicare wellness visit. The patient complains of back pain that he has had since 2021 but which has worsened in the past 2 weeks due to being more active. The patient reports that he has shooting pain down to his knee. He uses diclofenac pills twice a day (100 mg total) and occasionally aleve for pain relief. He reports that he is still having pain despite pain medications. He is walking about 1-2 miles per day down from 3-4 miles per day. The patient had an MRI in 2021 and received an injection in his back during that year, but he notes that it did not help. He would be interested in getting OMT done in the office. He also reports that he gets up to urinate twice a night for 3 nights per week for the past 2-3 weeks and would like to check his prostate. He has no other complaints or issues at this time. Reminded patient to get CT chest done for pulmonologist. Nadira Roche to get shingles and tetanus shot at his local pharmacy. Patient Care Team:  Martha Arzola DO as PCP - General (Family Medicine)     Review of Systems:     Review of Systems   Constitutional: Negative for chills and fever. HENT: Negative for ear pain and sore throat. Eyes: Negative for pain and visual disturbance. Respiratory: Negative for cough and shortness of breath.     Cardiovascular: Negative for chest pain and palpitations. Gastrointestinal: Negative for abdominal pain and vomiting. Genitourinary: Negative for dysuria and hematuria. Musculoskeletal: Positive for back pain. Negative for arthralgias. Lower back pain with radiation to left knee   Skin: Negative for color change and rash. Neurological: Negative for seizures and syncope. Psychiatric/Behavioral: Negative for behavioral problems and confusion. All other systems reviewed and are negative.        Problem List:     Patient Active Problem List   Diagnosis   • Hyperlipidemia   • Prediabetes   • Left leg pain   • Asbestos exposure   • Allergic rhinitis   • Conductive hearing loss   • Benign essential hypertension   • Dysfunction of eustachian tube   • History of smoking   • Pulmonary nodules   • Lumbar disc herniation with radiculopathy   • Spondylolisthesis of lumbar region   • Piriformis syndrome of left side   • Nocturia      Past Medical and Surgical History:     Past Medical History:   Diagnosis Date   • Hernia, inguinal, right 2/24/2021   • Hypertension    • Irregular heart beat    • Lung nodule      Past Surgical History:   Procedure Laterality Date   • BUNIONECTOMY Right    • COLONOSCOPY  2018   • HAND SURGERY Right     cyst excision   • WA RPR 1ST INGUN HRNA AGE 5 YRS/> REDUCIBLE Right 03/15/2021    Procedure: INGUINAL HERNIA REPAIR;  Surgeon: Alexx Fonseca DO;  Location: [facility];  Service: General   • SHOULDER SURGERY Right 2018      Family History:     Family History   Problem Relation Age of Onset   • Diabetes Mother    • Heart attack Father       Social History:     Social History     Socioeconomic History   • Marital status:      Spouse name: None   • Number of children: None   • Years of education: None   • Highest education level: None   Occupational History   • None   Tobacco Use   • Smoking status: Former     Packs/day: 1.00     Years: 10.00     Total pack years: 10.00     Types: Cigarettes     Quit date: 1979 Years since quittin.6   • Smokeless tobacco: Never   Vaping Use   • Vaping Use: Never used   Substance and Sexual Activity   • Alcohol use: Yes     Alcohol/week: 1.0 standard drink of alcohol     Types: 1 Glasses of wine per week     Comment: a glass of wine or two a night    • Drug use: Never   • Sexual activity: Yes     Partners: Female   Other Topics Concern   • None   Social History Narrative    · Do you currently or have you served in the [address] [place] [address] Identified: Yes      · If Yes, What branch of service:   [employer]      · Were you activated, into active duty, as a member of the [employer] or as a Reservist:   No      Social Determinants of Health     Financial Resource Strain: Low Risk  (2023)    Overall Financial Resource Strain (CARDIA)    • Difficulty of Paying Living Expenses: Not very hard   Food Insecurity: Not on file   Transportation Needs: No Transportation Needs (2023)    PRAPARE - Transportation    • Lack of Transportation (Medical): No    • Lack of Transportation (Non-Medical):  No   Physical Activity: Not on file   Stress: Not on file   Social Connections: Not on file   Intimate Partner Violence: Not on file   Housing Stability: Not on file      Medications and Allergies:     Current Outpatient Medications   Medication Sig Dispense Refill   • amLODIPine (NORVASC) 2.5 mg tablet Take 1 tablet (2.5 mg total) by mouth daily 90 tablet 1   • Ascorbic Acid (vitamin C) 1000 MG tablet Take 1,000 mg by mouth daily     • aspirin 81 mg chewable tablet Chew 81 mg daily at bedtime      • Cholecalciferol (Vitamin D-3) 25 MCG (1000 UT) CAPS Take 1 capsule by mouth     • Diclofenac Sodium (VOLTAREN) 1 % APPLY 2 GRAMS EXTERNALLY TO THE AFFECTED AREA FOUR TIMES DAILY     • Diclofenac Sodium (VOLTAREN) 1 % Apply 2 g topically 4 (four) times a day 350 g 2   • diclofenac sodium (VOLTAREN) 50 mg EC tablet Take 2 tablets (100 mg total) by mouth 2 (two) times a day 120 tablet 5   • finasteride (PROSCAR) 5 mg tablet TAKE 1/2 TABLET(2.5 MG) BY MOUTH DAILY AT BEDTIME 45 tablet 1   • Multiple Vitamin (MULTIVITAMIN ADULT PO) Take 1 tablet by mouth daily     • rosuvastatin (CRESTOR) 20 MG tablet Take 2 tablets (40 mg total) by mouth daily 180 tablet 1   • Turmeric 400 MG CAPS Take 1 capsule by mouth daily       No current facility-administered medications for this visit. No Known Allergies   Immunizations:     Immunization History   Administered Date(s) Administered   • COVID-19 PFIZER VACCINE 0.3 ML IM 03/07/2021, 03/28/2021, 11/06/2021   • DTP 03/08/2012   • Hep A, adult 04/01/2000, 10/13/2001   • INFLUENZA 03/08/2012, 09/15/2013, 10/20/2014, 11/20/2015, 10/01/2016, 10/08/2021, 10/06/2022   • Influenza Split High Dose Preservative Free IM 11/02/2017   • Influenza Whole 12/11/1999, 12/13/2000, 11/04/2001, 10/09/2002, 10/18/2003   • Influenza, high dose seasonal 0.7 mL 10/06/2022   • OPV 12/01/1970   • Pneumococcal Conjugate 13-Valent 07/14/2016   • Pneumococcal Polysaccharide PPV23 03/08/2012, 11/02/2017   • Td (adult), Unspecified 03/08/2012   • Td (adult), adsorbed 12/11/1999   • Typhoid, Unspecified 02/05/2000, 04/01/2000, 02/01/2003   • Yellow Fever 10/13/2001   • Zoster 04/09/2012   • Zoster Vaccine Recombinant 06/16/2021, 12/17/2021   • influenza, trivalent, adjuvanted 11/06/2018      Health Maintenance:         Topic Date Due   • Colorectal Cancer Screening  08/22/2028   • Hepatitis C Screening  Completed         Topic Date Due   • COVID-19 Vaccine (4 - Pfizer series) 01/01/2022   • Influenza Vaccine (1) 09/01/2023      Medicare Screening Tests and Risk Assessments:     Nereida Charles is here for his Subsequent Wellness visit. Last Medicare Wellness visit information reviewed, patient interviewed and updates made to the record today. Health Risk Assessment:   Patient rates overall health as good. Patient feels that their physical health rating is same. Patient is satisfied with their life.  Eyesight was rated as same. Hearing was rated as same. Patient feels that their emotional and mental health rating is same. Patients states they are never, rarely angry. Patient states they are sometimes unusually tired/fatigued. Pain experienced in the last 7 days has been some. Patient's pain rating has been 7/10. Patient states that he has experienced no weight loss or gain in last 6 months. Having an issue with my left leg, syarelisa    Fall Risk Screening: In the past year, patient has experienced: no history of falling in past year      Home Safety:  Patient does not have trouble with stairs inside or outside of their home. Patient has working smoke alarms and has working carbon monoxide detector. Home safety hazards include: none. Nutrition:   Current diet is Low Cholesterol, Low Saturated Fat and No Added Salt. Medications:   Patient is not currently taking any over-the-counter supplements. Patient is able to manage medications. Activities of Daily Living (ADLs)/Instrumental Activities of Daily Living (IADLs):   Walk and transfer into and out of bed and chair?: Yes  Dress and groom yourself?: Yes    Bathe or shower yourself?: Yes    Feed yourself? Yes  Do your laundry/housekeeping?: Yes  Manage your money, pay your bills and track your expenses?: Yes  Make your own meals?: Yes    Do your own shopping?: Yes    Durable Medical Equipment Suppliers  N/A    Previous Hospitalizations:   Any hospitalizations or ED visits within the last 12 months?: No      Advance Care Planning:   Living will: Yes    Durable POA for healthcare:  Yes    Advanced directive: No      PREVENTIVE SCREENINGS      Cardiovascular Screening:    General: Screening Not Indicated and History Lipid Disorder      Diabetes Screening:     General: Screening Current      Colorectal Cancer Screening:     General: Screening Current      Prostate Cancer Screening:    General: Screening Current      Abdominal Aortic Aneurysm (AAA) Screening:    Risk factors include: age between 70-75 yo and tobacco use        Lung Cancer Screening:     General: Screening Not Indicated      Hepatitis C Screening:    General: Screening Current    Screening, Brief Intervention, and Referral to Treatment (SBIRT)    Screening  Typical number of drinks in a day: 1  Typical number of drinks in a week: 7  Interpretation: Low risk drinking behavior. AUDIT-C Screenin) How often did you have a drink containing alcohol in the past year? 4 or more times a week  2) How many drinks did you have on a typical day when you were drinking in the past year? 1 to 2  3) How often did you have 6 or more drinks on one occasion in the past year? never    AUDIT-C Score: 4  Interpretation: Score 4-12 (male): POSITIVE screen for alcohol misuse    AUDIT Screenin) How often during the last year have you found that you were not able to stop drinking once you had started? 0 - never  5) How often during the last year have you failed to do what was normally expected from you because of drinking? 0 - never  6) How often during the last year have you needed a first drink in the morning to get yourself going after a heavy drinking session?  0 - never  7) How often during the last year have you had a feeling of guilt or remorse after drinking? 0 - never  8) How often during the last year have you been unable to remember what happened the night before because you had been drinking? 0 - never  9) Have you or someone else been injured as a result of your drinking? 0 - no  10) Has a relative or friend or a doctor or another health worker been concerned about your drinking or suggested you cut down? 0 - no    AUDIT Score: 4  Interpretation: Low risk alcohol consumption    Single Item Drug Screening:  How often have you used an illegal drug (including marijuana) or a prescription medication for non-medical reasons in the past year? never    Single Item Drug Screen Score: 0  Interpretation: Negative screen for possible drug use disorder    No results found. Physical Exam:     /82   Pulse 65   Temp 98.4 °F (36.9 °C)   Resp 14   Ht 5' 8" (1.727 m)   Wt 68.9 kg (152 lb)   SpO2 97%   BMI 23.11 kg/m²     Physical Exam  Vitals and nursing note reviewed. Constitutional:       General: He is not in acute distress. Appearance: He is well-developed. Comments: Body mass index is 23.11 kg/m². HENT:      Head: Normocephalic and atraumatic. Right Ear: External ear normal.      Left Ear: External ear normal.      Nose: Nose normal.      Mouth/Throat:      Mouth: Mucous membranes are moist.      Pharynx: Oropharynx is clear. Eyes:      Extraocular Movements: Extraocular movements intact. Conjunctiva/sclera: Conjunctivae normal.      Pupils: Pupils are equal, round, and reactive to light. Cardiovascular:      Rate and Rhythm: Normal rate and regular rhythm. Pulses: Normal pulses. Heart sounds: Normal heart sounds. No murmur heard. Pulmonary:      Effort: Pulmonary effort is normal. No respiratory distress. Breath sounds: Normal breath sounds. Abdominal:      General: Abdomen is flat. Palpations: Abdomen is soft. Tenderness: There is no abdominal tenderness. Musculoskeletal:         General: No swelling. Cervical back: Normal range of motion and neck supple. Comments: Hypertonicity of piriformis, no spinal tenderness   Skin:     General: Skin is warm and dry. Capillary Refill: Capillary refill takes less than 2 seconds. Neurological:      General: No focal deficit present. Mental Status: He is alert and oriented to person, place, and time. Mental status is at baseline.    Psychiatric:         Mood and Affect: Mood normal.         Behavior: Behavior normal.          Urmila Gain, DO

## 2023-08-23 NOTE — PATIENT INSTRUCTIONS
Medicare Preventive Visit Patient Instructions  Thank you for completing your Welcome to Medicare Visit or Medicare Annual Wellness Visit today. Your next wellness visit will be due in one year (8/23/2024). The screening/preventive services that you may require over the next 5-10 years are detailed below. Some tests may not apply to you based off risk factors and/or age. Screening tests ordered at today's visit but not completed yet may show as past due. Also, please note that scanned in results may not display below. Preventive Screenings:  Service Recommendations Previous Testing/Comments   Colorectal Cancer Screening  · Colonoscopy    · Fecal Occult Blood Test (FOBT)/Fecal Immunochemical Test (FIT)  · Fecal DNA/Cologuard Test  · Flexible Sigmoidoscopy Age: 43-73 years old   Colonoscopy: every 10 years (May be performed more frequently if at higher risk)  OR  FOBT/FIT: every 1 year  OR  Cologuard: every 3 years  OR  Sigmoidoscopy: every 5 years  Screening may be recommended earlier than age 39 if at higher risk for colorectal cancer. Also, an individualized decision between you and your healthcare provider will decide whether screening between the ages of 77-80 would be appropriate.  Colonoscopy: 08/22/2018  FOBT/FIT: Not on file  Cologuard: Not on file  Sigmoidoscopy: Not on file    Screening Current     Prostate Cancer Screening Individualized decision between patient and health care provider in men between ages of 53-66   Medicare will cover every 12 months beginning on the day after your 50th birthday PSA: 0.3 ng/mL     Screening Current     Hepatitis C Screening Once for adults born between 1945 and 1965  More frequently in patients at high risk for Hepatitis C Hep C Antibody: 02/25/2021    Screening Current   Diabetes Screening 1-2 times per year if you're at risk for diabetes or have pre-diabetes Fasting glucose: 131 mg/dL (4/20/2023)  A1C: 6.1 (1/30/2023)  Screening Current   Cholesterol Screening Once every 5 years if you don't have a lipid disorder. May order more often based on risk factors. Lipid panel: 04/20/2023  Screening Not Indicated  History Lipid Disorder      Other Preventive Screenings Covered by Medicare:  1. Abdominal Aortic Aneurysm (AAA) Screening: covered once if your at risk. You're considered to be at risk if you have a family history of AAA or a male between the age of 70-76 who smoking at least 100 cigarettes in your lifetime. 2. Lung Cancer Screening: covers low dose CT scan once per year if you meet all of the following conditions: (1) Age 48-67; (2) No signs or symptoms of lung cancer; (3) Current smoker or have quit smoking within the last 15 years; (4) You have a tobacco smoking history of at least 20 pack years (packs per day x number of years you smoked); (5) You get a written order from a healthcare provider. 3. Glaucoma Screening: covered annually if you're considered high risk: (1) You have diabetes OR (2) Family history of glaucoma OR (3)  aged 48 and older OR (3)  American aged 72 and older  3. Osteoporosis Screening: covered every 2 years if you meet one of the following conditions: (1) Have a vertebral abnormality; (2) On glucocorticoid therapy for more than 3 months; (3) Have primary hyperparathyroidism; (4) On osteoporosis medications and need to assess response to drug therapy. 5. HIV Screening: covered annually if you're between the age of 14-79. Also covered annually if you are younger than 13 and older than 72 with risk factors for HIV infection. For pregnant patients, it is covered up to 3 times per pregnancy.     Immunizations:  Immunization Recommendations   Influenza Vaccine Annual influenza vaccination during flu season is recommended for all persons aged >= 6 months who do not have contraindications   Pneumococcal Vaccine   * Pneumococcal conjugate vaccine = PCV13 (Prevnar 13), PCV15 (Vaxneuvance), PCV20 (Prevnar 20)  * Pneumococcal polysaccharide vaccine = PPSV23 (Pneumovax) Adults 2364 years old: 1-3 doses may be recommended based on certain risk factors  Adults 72 years old: 1-2 doses may be recommended based off what pneumonia vaccine you previously received   Hepatitis B Vaccine 3 dose series if at intermediate or high risk (ex: diabetes, end stage renal disease, liver disease)   Tetanus (Td) Vaccine - COST NOT COVERED BY MEDICARE PART B Following completion of primary series, a booster dose should be given every 10 years to maintain immunity against tetanus. Td may also be given as tetanus wound prophylaxis. Tdap Vaccine - COST NOT COVERED BY MEDICARE PART B Recommended at least once for all adults. For pregnant patients, recommended with each pregnancy. Shingles Vaccine (Shingrix) - COST NOT COVERED BY MEDICARE PART B  2 shot series recommended in those aged 48 and above     Health Maintenance Due:      Topic Date Due   • Colorectal Cancer Screening  08/22/2028   • Hepatitis C Screening  Completed     Immunizations Due:      Topic Date Due   • COVID-19 Vaccine (4 - Pfizer series) 01/01/2022   • Influenza Vaccine (1) 09/01/2023     Advance Directives   What are advance directives? Advance directives are legal documents that state your wishes and plans for medical care. These plans are made ahead of time in case you lose your ability to make decisions for yourself. Advance directives can apply to any medical decision, such as the treatments you want, and if you want to donate organs. What are the types of advance directives? There are many types of advance directives, and each state has rules about how to use them. You may choose a combination of any of the following:  · Living will: This is a written record of the treatment you want. You can also choose which treatments you do not want, which to limit, and which to stop at a certain time. This includes surgery, medicine, IV fluid, and tube feedings.    · Durable power of  for [facility]): This is a written record that states who you want to make healthcare choices for you when you are unable to make them for yourself. This person, called a proxy, is usually a family member or a friend. You may choose more than 1 proxy. · Do not resuscitate (DNR) order:  A DNR order is used in case your heart stops beating or you stop breathing. It is a request not to have certain forms of treatment, such as CPR. A DNR order may be included in other types of advance directives. · Medical directive: This covers the care that you want if you are in a coma, near death, or unable to make decisions for yourself. You can list the treatments you want for each condition. Treatment may include pain medicine, surgery, blood transfusions, dialysis, IV or tube feedings, and a ventilator (breathing machine). · Values history: This document has questions about your views, beliefs, and how you feel and think about life. This information can help others choose the care that you would choose. Why are advance directives important? An advance directive helps you control your care. Although spoken wishes may be used, it is better to have your wishes written down. Spoken wishes can be misunderstood, or not followed. Treatments may be given even if you do not want them. An advance directive may make it easier for your family to make difficult choices about your care. Alcohol Use and Your Health    Drinking too much can harm your health. Excessive alcohol use leads to about 88,000 death in the [facility] each year, and shortens the life of those who diet by almost 30 years. Further, excessive drinking cost the economy $249 billion in 2010. Most excessive drinkers are not alcohol dependent. Excessive alcohol use has immediate effects that increase the risk of many harmful health conditions. These are most often the result of binge drinking.   Over time, excessive alcohol use can lead to the development of chronic diseases and other series health problems. What is considered a "drink"? Excessive alcohol use includes:  · Binge Drinking: For women, 4 or more drinks consumed on one occasion. For men, 5 or more drinks consumed on one occasion. · Heavy Drinking: For women, 8 or more drinks per week. For men, 15 or more drinks per week  · Any alcohol used by pregnant women  · Any alcohol used by those under the age of 21 years    If you choose to drink, do so in moderation:  · Do not drink at all if you are under the age of 24, or if you are or may be pregnant, or have health problems that could be made worse by drinking.   · For women, up to 1 drink per day  · For men, up to 2 drinks a day    No one should begin drinking or drink more frequently based on potential health benefits    Short-Term Health Risks:  · Injuries: motor vehicle crashes, falls, drownings, burns  · Violence: homicide, suicide, sexual assault, intimate partner violence  · Alcohol poisoning  · Reproductive health: risky sexual behaviors, unintended prengnacy, sexually transmitted diseases, miscarriage, stillbirth, fetal alcohol syndrome    Long-Term Health Risks:  · Chronic diseases: high blood pressure, heart disease, stroke, liver disease, digestive problems  · Cancers: breast, mouth and throat, liver, colon  · Learning and memory problems: dementia, poor school performance  · Mental health: depression, anxiety, insomnia  · Social problems: lost productivity, family problems, unemployment  · Alcohol dependence    For support and more information:  · Substance Abuse and [address]  Web Address: https://[employer]/    · Alcoholics Anonymous        Web Address: http://www.stern.info/    https://www.cdc.gov/alcohol/fact-sheets/alcohol-use.htm     © Copyright [employer] 2018 Information is for End User's use only and may not be sold, redistributed or otherwise used for commercial purposes. All illustrations and images included in CareNotes® are the copyrighted property of [employer].A[employer]M., Inc. or [facility] Preventive Visit Patient Instructions  Thank you for completing your Welcome to Medicare Visit or Medicare Annual Wellness Visit today. Your next wellness visit will be due in one year (8/23/2024). The screening/preventive services that you may require over the next 5-10 years are detailed below. Some tests may not apply to you based off risk factors and/or age. Screening tests ordered at today's visit but not completed yet may show as past due. Also, please note that scanned in results may not display below. Preventive Screenings:  Service Recommendations Previous Testing/Comments   Colorectal Cancer Screening  · Colonoscopy    · Fecal Occult Blood Test (FOBT)/Fecal Immunochemical Test (FIT)  · Fecal DNA/Cologuard Test  · Flexible Sigmoidoscopy Age: 43-73 years old   Colonoscopy: every 10 years (May be performed more frequently if at higher risk)  OR  FOBT/FIT: every 1 year  OR  Cologuard: every 3 years  OR  Sigmoidoscopy: every 5 years  Screening may be recommended earlier than age 39 if at higher risk for colorectal cancer. Also, an individualized decision between you and your healthcare provider will decide whether screening between the ages of 77-80 would be appropriate.  Colonoscopy: 08/22/2018  FOBT/FIT: Not on file  Cologuard: Not on file  Sigmoidoscopy: Not on file    Screening Current     Prostate Cancer Screening Individualized decision between patient and health care provider in men between ages of 53-66   Medicare will cover every 12 months beginning on the day after your 50th birthday PSA: 0.3 ng/mL     Screening Current     Hepatitis C Screening Once for adults born between 1945 and 1965  More frequently in patients at high risk for Hepatitis C Hep C Antibody: 02/25/2021    Screening Current   Diabetes Screening 1-2 times per year if you're at risk for diabetes or have pre-diabetes Fasting glucose: 131 mg/dL (4/20/2023)  A1C: 5.8 (8/23/2023)  Screening Current   Cholesterol Screening Once every 5 years if you don't have a lipid disorder. May order more often based on risk factors. Lipid panel: 04/20/2023  Screening Not Indicated  History Lipid Disorder      Other Preventive Screenings Covered by Medicare:  6. Abdominal Aortic Aneurysm (AAA) Screening: covered once if your at risk. You're considered to be at risk if you have a family history of AAA or a male between the age of 70-76 who smoking at least 100 cigarettes in your lifetime. 7. Lung Cancer Screening: covers low dose CT scan once per year if you meet all of the following conditions: (1) Age 48-67; (2) No signs or symptoms of lung cancer; (3) Current smoker or have quit smoking within the last 15 years; (4) You have a tobacco smoking history of at least 20 pack years (packs per day x number of years you smoked); (5) You get a written order from a healthcare provider. 8. Glaucoma Screening: covered annually if you're considered high risk: (1) You have diabetes OR (2) Family history of glaucoma OR (3)  aged 48 and older OR (3)  American aged 72 and older  5. Osteoporosis Screening: covered every 2 years if you meet one of the following conditions: (1) Have a vertebral abnormality; (2) On glucocorticoid therapy for more than 3 months; (3) Have primary hyperparathyroidism; (4) On osteoporosis medications and need to assess response to drug therapy. 10. HIV Screening: covered annually if you're between the age of 14-79. Also covered annually if you are younger than 13 and older than 72 with risk factors for HIV infection. For pregnant patients, it is covered up to 3 times per pregnancy.     Immunizations:  Immunization Recommendations   Influenza Vaccine Annual influenza vaccination during flu season is recommended for all persons aged >= 6 months who do not have contraindications Pneumococcal Vaccine   * Pneumococcal conjugate vaccine = PCV13 (Prevnar 13), PCV15 (Vaxneuvance), PCV20 (Prevnar 20)  * Pneumococcal polysaccharide vaccine = PPSV23 (Pneumovax) Adults 2364 years old: 1-3 doses may be recommended based on certain risk factors  Adults 72 years old: 1-2 doses may be recommended based off what pneumonia vaccine you previously received   Hepatitis B Vaccine 3 dose series if at intermediate or high risk (ex: diabetes, end stage renal disease, liver disease)   Tetanus (Td) Vaccine - COST NOT COVERED BY MEDICARE PART B Following completion of primary series, a booster dose should be given every 10 years to maintain immunity against tetanus. Td may also be given as tetanus wound prophylaxis. Tdap Vaccine - COST NOT COVERED BY MEDICARE PART B Recommended at least once for all adults. For pregnant patients, recommended with each pregnancy. Shingles Vaccine (Shingrix) - COST NOT COVERED BY MEDICARE PART B  2 shot series recommended in those aged 48 and above     Health Maintenance Due:      Topic Date Due   • Colorectal Cancer Screening  08/22/2028   • Hepatitis C Screening  Completed     Immunizations Due:      Topic Date Due   • COVID-19 Vaccine (4 - Pfizer series) 01/01/2022   • Influenza Vaccine (1) 09/01/2023     Advance Directives   What are advance directives? Advance directives are legal documents that state your wishes and plans for medical care. These plans are made ahead of time in case you lose your ability to make decisions for yourself. Advance directives can apply to any medical decision, such as the treatments you want, and if you want to donate organs. What are the types of advance directives? There are many types of advance directives, and each state has rules about how to use them. You may choose a combination of any of the following:  · Living will: This is a written record of the treatment you want.  You can also choose which treatments you do not want, which to limit, and which to stop at a certain time. This includes surgery, medicine, IV fluid, and tube feedings. · Durable power of  for healthcare [facility]): This is a written record that states who you want to make healthcare choices for you when you are unable to make them for yourself. This person, called a proxy, is usually a family member or a friend. You may choose more than 1 proxy. · Do not resuscitate (DNR) order:  A DNR order is used in case your heart stops beating or you stop breathing. It is a request not to have certain forms of treatment, such as CPR. A DNR order may be included in other types of advance directives. · Medical directive: This covers the care that you want if you are in a coma, near death, or unable to make decisions for yourself. You can list the treatments you want for each condition. Treatment may include pain medicine, surgery, blood transfusions, dialysis, IV or tube feedings, and a ventilator (breathing machine). · Values history: This document has questions about your views, beliefs, and how you feel and think about life. This information can help others choose the care that you would choose. Why are advance directives important? An advance directive helps you control your care. Although spoken wishes may be used, it is better to have your wishes written down. Spoken wishes can be misunderstood, or not followed. Treatments may be given even if you do not want them. An advance directive may make it easier for your family to make difficult choices about your care. Alcohol Use and Your Health    Drinking too much can harm your health. Excessive alcohol use leads to about 88,000 death in the [facility] each year, and shortens the life of those who diet by almost 30 years. Further, excessive drinking cost the economy $249 billion in 2010. Most excessive drinkers are not alcohol dependent.     Excessive alcohol use has immediate effects that increase the risk of many harmful health conditions. These are most often the result of binge drinking. Over time, excessive alcohol use can lead to the development of chronic diseases and other series health problems. What is considered a "drink"? Excessive alcohol use includes:  · Binge Drinking: For women, 4 or more drinks consumed on one occasion. For men, 5 or more drinks consumed on one occasion. · Heavy Drinking: For women, 8 or more drinks per week. For men, 15 or more drinks per week  · Any alcohol used by pregnant women  · Any alcohol used by those under the age of 21 years    If you choose to drink, do so in moderation:  · Do not drink at all if you are under the age of 24, or if you are or may be pregnant, or have health problems that could be made worse by drinking.   · For women, up to 1 drink per day  · For men, up to 2 drinks a day    No one should begin drinking or drink more frequently based on potential health benefits    Short-Term Health Risks:  · Injuries: motor vehicle crashes, falls, drownings, burns  · Violence: homicide, suicide, sexual assault, intimate partner violence  · Alcohol poisoning  · Reproductive health: risky sexual behaviors, unintended prengnacy, sexually transmitted diseases, miscarriage, stillbirth, fetal alcohol syndrome    Long-Term Health Risks:  · Chronic diseases: high blood pressure, heart disease, stroke, liver disease, digestive problems  · Cancers: breast, mouth and throat, liver, colon  · Learning and memory problems: dementia, poor school performance  · Mental health: depression, anxiety, insomnia  · Social problems: lost productivity, family problems, unemployment  · Alcohol dependence    For support and more information:  · Substance Abuse and [address]  Web Address: https://[employer]/    · Alcoholics Anonymous        Web Address: http://www.stern.info/    https://www.cdc.gov/alcohol/fact-sheets/alcohol-use.htm     © Copyright [employer] 2018 Information is for End User's use only and may not be sold, redistributed or otherwise used for commercial purposes.  All illustrations and images included in CareNotes® are the copyrighted property of [employer] or [address] [place] [address]

## 2023-08-23 NOTE — ASSESSMENT & PLAN NOTE
- nocturia could be related to BPH  - last PSA was 0.3 in 12/2022  - discussed with patient about current PSA recommendations and shared decision making was used, patient would like to still monitor PSA

## 2023-08-31 ENCOUNTER — PROCEDURE VISIT (OUTPATIENT)
Dept: FAMILY MEDICINE CLINIC | Facility: OTHER | Age: 73
End: 2023-08-31
Payer: MEDICARE

## 2023-08-31 VITALS — HEIGHT: 68 IN | BODY MASS INDEX: 23.11 KG/M2

## 2023-08-31 DIAGNOSIS — M99.03 SOMATIC DYSFUNCTION OF SPINE, LUMBAR: ICD-10-CM

## 2023-08-31 DIAGNOSIS — G57.02 PIRIFORMIS SYNDROME OF LEFT SIDE: Primary | ICD-10-CM

## 2023-08-31 DIAGNOSIS — M99.02 SOMATIC DYSFUNCTION OF SPINE, THORACIC: ICD-10-CM

## 2023-08-31 DIAGNOSIS — Z23 ENCOUNTER FOR IMMUNIZATION: ICD-10-CM

## 2023-08-31 DIAGNOSIS — M79.605 LEFT LEG PAIN: ICD-10-CM

## 2023-08-31 DIAGNOSIS — M99.05 SOMATIC DYSFUNCTION OF PELVIS REGION: ICD-10-CM

## 2023-08-31 PROCEDURE — 90662 IIV NO PRSV INCREASED AG IM: CPT

## 2023-08-31 PROCEDURE — 99213 OFFICE O/P EST LOW 20 MIN: CPT | Performed by: FAMILY MEDICINE

## 2023-08-31 PROCEDURE — G0008 ADMIN INFLUENZA VIRUS VAC: HCPCS

## 2023-08-31 NOTE — PROGRESS NOTES
The Assessment/Plan     This is a 68 y.o. male who presents for OMT follow-up for:  1. Piriformis syndrome of left side        2. Left leg pain        3. Somatic dysfunction of spine, thoracic        4. Somatic dysfunction of spine, lumbar        5. Somatic dysfunction of pelvis region        6. Encounter for immunization  influenza vaccine, high-dose, PF 0.7 mL (FLUZONE HIGH-DOSE)           1. Patient tolerated OMT well for the above problems,  advised patient to drink fluids and can use NSAID for soreness after treatment     2. OMT Follow up in 2 weeks. Jenn Cifuentes is a 68 y.o. male and is here for a OMT follow up. The patient reports chronic ongoing lower back pain with left leg pain radiating downwards. He reports having received shots in his back for pain control between L3/L4 without relief. He is unaware of any specific inciting event, denies any trauma to the lower back. Imaging in the form of an MRI obtained in 2021 remarks on degenerative changes in the lumbar spine with neural foraminal narrowing at L3-L4 and L4-L5 on the left and narrowing of the thecal sac at L3-L4. There is signs of left L3 as well as left L5 nerve root impingement. Is the patient taking Pain medication? yes  Has the patient completed physical therapy for this condition? yes  Did Patient symptoms improve from last OMT appointment? First appointment     The following portions of the patient's history were reviewed and updated as appropriate: allergies, current medications, past family history, past medical history, past social history, past surgical history and problem list.    Review of Systems  Review of Systems   Constitutional: Negative for chills and fever. HENT: Negative for ear pain and sore throat. Eyes: Negative for pain and visual disturbance. Respiratory: Negative for cough and shortness of breath. Cardiovascular: Negative for chest pain and palpitations.    Gastrointestinal: Negative for abdominal pain and vomiting. Genitourinary: Negative for dysuria and hematuria. Musculoskeletal: Positive for back pain and myalgias. Negative for arthralgias. Skin: Negative for color change and rash. Neurological: Negative for seizures and syncope. All other systems reviewed and are negative. Objective     OMT Exam     OMT    Performed by: Kevin Balderas DO  Authorized by: Kevin Balderas DO  Universal Protocol:  Procedure performed by:  Consent: Verbal consent obtained. Consent given by: patient  Patient identity confirmed: verbally with patient        Procedure Details:     Region evaluated and treated:  Lumbar, Sacrum/Pelvis and Thoracic    Thoracic Information  Thoracic Region: T10 - T12  Thoracic T10 - T12 details:     Examination Method:  Tissue Texture Change, Stability, Laxity, Effusions, Tone, Asymmetry, Misalignment, Crepitation, Defects, Masses, Tenderness, Pain and Range of Motion, Contracture    Severity:  Moderate    Osteopathic Findings:  Hypertonicity of the lower thoracic paraspinal muscles R > L  Right posterior T12 tender point    Treatment Method:  Soft Tissue Treatment, Myofascial Release Treatment, High Velocity, Low Amplitude Treatment and Counterstrain Treatment    Response:  Improved - The somatic dysfunction is improved but not completely resolved. Lumbar details:     Examination Method:  Tissue Texture Change, Stability, Laxity, Effusions, Tone, Asymmetry, Misalignment, Crepitation, Defects, Masses, Tenderness, Pain and Range of Motion, Contracture    Severity:  Severe    Osteopathic Findings:  Hypertonicity of the lumbar paraspinal muscles R > L      Treatment Method:  Soft Tissue Treatment, Myofascial Release Treatment and High Velocity, Low Amplitude Treatment    Response:  Improved - The somatic dysfunction is improved but not completely resolved.     Sacrum/Pelvis details:     Examination Method:  Asymmetry, Misalignment, Crepitation, Defects, Masses, Tenderness, Pain, Tissue Texture Change, Stability, Laxity, Effusions, Tone and Range of Motion, Contracture    Severity:  Moderate    Osteopathic Findings:  Tight gluteus medius muscles L > R    Treatment Method:  Soft Tissue Treatment, Myofascial Release Treatment and Direct Treatment    Response:  Improved - The somatic dysfunction is improved but not completely resolved.     Total Regions Treated:  3

## 2023-09-15 ENCOUNTER — PROCEDURE VISIT (OUTPATIENT)
Dept: FAMILY MEDICINE CLINIC | Facility: OTHER | Age: 73
End: 2023-09-15
Payer: MEDICARE

## 2023-09-15 DIAGNOSIS — M99.02 SOMATIC DYSFUNCTION OF SPINE, THORACIC: ICD-10-CM

## 2023-09-15 DIAGNOSIS — G89.29 CHRONIC LEFT-SIDED LOW BACK PAIN WITHOUT SCIATICA: ICD-10-CM

## 2023-09-15 DIAGNOSIS — M54.16 LUMBAR RADICULITIS: Primary | ICD-10-CM

## 2023-09-15 DIAGNOSIS — M54.50 CHRONIC LEFT-SIDED LOW BACK PAIN WITHOUT SCIATICA: ICD-10-CM

## 2023-09-15 DIAGNOSIS — M99.03 SOMATIC DYSFUNCTION OF SPINE, LUMBAR: ICD-10-CM

## 2023-09-15 DIAGNOSIS — M99.05 SOMATIC DYSFUNCTION OF PELVIS REGION: ICD-10-CM

## 2023-09-15 DIAGNOSIS — G57.02 PIRIFORMIS SYNDROME OF LEFT SIDE: ICD-10-CM

## 2023-09-15 PROCEDURE — 98926 OSTEOPATH MANJ 3-4 REGIONS: CPT | Performed by: FAMILY MEDICINE

## 2023-09-15 PROCEDURE — 99213 OFFICE O/P EST LOW 20 MIN: CPT | Performed by: FAMILY MEDICINE

## 2023-09-15 RX ORDER — METHYLPREDNISOLONE 4 MG/1
TABLET ORAL
Qty: 21 EACH | Refills: 0 | Status: SHIPPED | OUTPATIENT
Start: 2023-09-15

## 2023-09-15 RX ORDER — BACLOFEN 10 MG/1
10 TABLET ORAL 3 TIMES DAILY
Qty: 42 TABLET | Refills: 0 | Status: SHIPPED | OUTPATIENT
Start: 2023-09-15 | End: 2023-09-29

## 2023-09-15 NOTE — PROGRESS NOTES
The Assessment/Plan     This is a 68 y.o. male who presents for OMT follow-up for:  1. Lumbar radiculitis  baclofen 10 mg tablet   - Patient noting significant improvement in symptoms immediately following last OMT session but exacerbation of the his back back pain within 24-48 hours  - Notes back pain is 10/10, improved today but worst last night  - Patient notes left lumbar back pain with radiation along the L3, L4 dermatome across the anterior thigh  - Given dermatomal radiation of the pain suspect lumbar radiculitis   - No red flag symptoms     Plan  - Continue OMT  - Start Medrol dose pack  - Start baclofen 10 mg TID  - Advised patient to be cautious while on muscle relaxants due to their sedative effect  methylPREDNISolone 4 MG tablet therapy pack      2. Chronic left-sided low back pain without sciatica        3. Piriformis syndrome of left side        4. Somatic dysfunction of spine, thoracic        5. Somatic dysfunction of spine, lumbar        6. Somatic dysfunction of pelvis region             1. Patient tolerated OMT well for the above problems,  advised patient to drink fluids and can use NSAID for soreness after treatment     2. OMT Follow up in 2 weeks. Promise العراقي is a 68 y.o. male and is here for a OMT follow up. The patient reports pain radiating to the front of his left thigh (L3-L4 dermatome). Notes pain was 10/10 last night. He reports initial symptom improvement follow last OMT session but return of symptoms with exacerbation within 24 hours. He denies any weakness or change in gait at this time. Is the patient taking Pain medication? yes  Has the patient completed physical therapy for this condition?  no  Did Patient symptoms improve from last OMT appointment? no    The following portions of the patient's history were reviewed and updated as appropriate: allergies, current medications, past family history, past medical history, past social history, past surgical history and problem list.    Review of Systems  Review of Systems   Constitutional: Negative for chills and fever. HENT: Negative for ear pain and sore throat. Eyes: Negative for pain and visual disturbance. Respiratory: Negative for cough and shortness of breath. Cardiovascular: Negative for chest pain and palpitations. Gastrointestinal: Negative for abdominal pain and vomiting. Genitourinary: Negative for dysuria and hematuria. Musculoskeletal: Positive for back pain. Negative for arthralgias. Skin: Negative for color change and rash. Neurological: Negative for seizures and syncope. All other systems reviewed and are negative. Objective     OMT Exam     OMT    Performed by: Calista Curling, DO  Authorized by: Calista Curling, DO  Universal Protocol:  Procedure performed by:  Consent: Verbal consent obtained. Consent given by: patient  Patient identity confirmed: verbally with patient        Procedure Details:     Region evaluated and treated:  Thoracic, Lumbar and Sacrum/Pelvis    Thoracic Information  Thoracic Region: T10 - T12  Thoracic T10 - T12 details:     Examination Method:  Tissue Texture Change, Stability, Laxity, Effusions, Tone, Asymmetry, Misalignment, Crepitation, Defects, Masses, Tenderness, Pain and Range of Motion, Contracture    Severity:  Severe    Osteopathic Findings:  Significant hypertonicity of the lower thoracic paraspinal muscles L > R     Treatment Method:  Soft Tissue Treatment, Myofascial Release Treatment, Muscle Energy Treatment and Counterstrain Treatment    Response:  Improved - The somatic dysfunction is improved but not completely resolved.     Lumbar details:     Examination Method:  Tissue Texture Change, Stability, Laxity, Effusions, Tone, Range of Motion, Contracture, Tenderness, Pain and Asymmetry, Misalignment, Crepitation, Defects, Masses    Severity:  Severe    Osteopathic Findings:  Significant hypertonicity of the lumbar paraspinal muscles L > R     Treatment Method:  Myofascial Release Treatment, Muscle Energy Treatment, High Velocity, Low Amplitude Treatment and Counterstrain Treatment    Response:  Improved - The somatic dysfunction is improved but not completely resolved. Sacrum/Pelvis details:     Examination Method:  Tenderness, Pain and Range of Motion, Contracture    Severity:  Mild    Osteopathic Findings:  Mild hypertonicity of the left piriformis muscle     Treatment Method:  Soft Tissue Treatment and Myofascial Release Treatment    Response:  Improved - The somatic dysfunction is improved but not completely resolved. Total Regions Treated:  3  Attending provider present in exam room for procedure:  Yes

## 2023-09-15 NOTE — LETTER
September 18, 2023     Evelyn Hi  [address] [place] [id number]    Patient: Daryl Quan   YOB: 1950   Date of Visit: 9/15/2023       To whom it may concern,    Elpidio Horvath is under my professional care. Due to a medical exacerbation patient was unable to travel during the weeks of September 10th to the 24th    If you have questions, please do not hesitate to call me.       Sincerely,    Radha Gonzalez DO        CC: No Recipients

## 2023-09-18 ENCOUNTER — TELEPHONE (OUTPATIENT)
Dept: FAMILY MEDICINE CLINIC | Facility: OTHER | Age: 73
End: 2023-09-18

## 2023-09-18 PROBLEM — M54.16 LUMBAR RADICULITIS: Status: ACTIVE | Noted: 2023-09-18

## 2023-09-18 PROBLEM — M54.50 CHRONIC LEFT-SIDED LOW BACK PAIN WITHOUT SCIATICA: Status: ACTIVE | Noted: 2023-09-18

## 2023-09-18 PROBLEM — G89.29 CHRONIC LEFT-SIDED LOW BACK PAIN WITHOUT SCIATICA: Status: ACTIVE | Noted: 2023-09-18

## 2023-09-18 NOTE — TELEPHONE ENCOUNTER
Pt called inquiring about status of letter for him to submit to insurance regarding claim for not being able to go on his cruise for medical reasoning.  (corrected dates)  The correct dates for the cruise are September 17-24. He is also inquiring if you could add a second paragraph for Gerry Peña (his significant other), that she is his caregiver and also had to miss cruise to provide care. Please let me know when completed. Thank you.

## 2023-10-16 ENCOUNTER — PROCEDURE VISIT (OUTPATIENT)
Dept: FAMILY MEDICINE CLINIC | Facility: OTHER | Age: 73
End: 2023-10-16

## 2023-10-16 DIAGNOSIS — M54.41 CHRONIC BILATERAL LOW BACK PAIN WITH BILATERAL SCIATICA: Primary | ICD-10-CM

## 2023-10-16 DIAGNOSIS — M99.05 SOMATIC DYSFUNCTION OF PELVIS REGION: ICD-10-CM

## 2023-10-16 DIAGNOSIS — M54.42 CHRONIC BILATERAL LOW BACK PAIN WITH BILATERAL SCIATICA: Primary | ICD-10-CM

## 2023-10-16 DIAGNOSIS — M54.16 LUMBAR RADICULITIS: ICD-10-CM

## 2023-10-16 DIAGNOSIS — G89.29 CHRONIC BILATERAL LOW BACK PAIN WITH BILATERAL SCIATICA: Primary | ICD-10-CM

## 2023-10-16 DIAGNOSIS — M99.03 SOMATIC DYSFUNCTION OF SPINE, LUMBAR: ICD-10-CM

## 2023-10-16 DIAGNOSIS — M99.02 SOMATIC DYSFUNCTION OF SPINE, THORACIC: ICD-10-CM

## 2023-10-16 NOTE — PROGRESS NOTES
Assessment/Plan     This is a 68 y.o. male who presents for OMT follow-up for:  1. Chronic bilateral low back pain with bilateral sciatica  Ambulatory referral to Spine & Pain Management    OMT    Ambulatory Referral to Physical Therapy      2. Lumbar radiculitis  Ambulatory referral to Spine & Pain Management    OMT      3. Somatic dysfunction of spine, thoracic        4. Somatic dysfunction of spine, lumbar        5. Somatic dysfunction of pelvis region            Plan:   1. Patient tolerated OMT well for the above problems,  advised patient to drink fluids and can use NSAID for soreness after treatment     2. OMT Follow up in 2 weeks. 3. Given the chronicity of Shaun's low back pain, we recommended that he additionally follow with our Spine & Pain Management clinic for evaluation and possible treatment. Yaa Delacruz is a 68 y.o. male and is here for a OMT follow up for chronic back pain affecting the L3-L5 nerve roots. The patient reports improvement of back/leg pain the evening following the last OMT visit 4wks ago, specifically with methylprednisolone. Over the following 48hrs, his pain started to gradually return. He notes minimal relief with diclofenac 50mg. Morris Rodriguez tends to keep active working on various projects around his home 3/7 days per week. Lately, his low back pain is predominatnly in the evenings radiating to the L3-L4 LE extremity distributions. Is the patient taking Pain medication? yes  Has the patient completed physical therapy for this condition? no  Did Patient symptoms improve from last OMT appointment?  yes    The following portions of the patient's history were reviewed and updated as appropriate: allergies, current medications, past family history, past medical history, past social history, past surgical history, and problem list.    Review of Systems  Do you have pain that bothers you in your daily life? yes  Review of Systems   Gastrointestinal: Negative for constipation and diarrhea. Endocrine: Negative for polyuria. Genitourinary:  Negative for difficulty urinating and dysuria. Musculoskeletal:  Positive for arthralgias, back pain and gait problem. Skin:  Negative for color change and rash. Neurological:  Negative for dizziness, weakness, light-headedness and headaches. Objective     OMT Exam   OMT    Performed by: Prachi Howell DO  Authorized by: Prachi Howell DO  Universal Protocol:  Consent: Verbal consent obtained. Risks and benefits: risks, benefits and alternatives were discussed  Consent given by: patient  Patient identity confirmed: verbally with patient      Procedure Details:     Region evaluated and treated:  Lumbar, Sacrum/Pelvis and Thoracic    Thoracic Information  Thoracic Region: T10 - T12  Thoracic T10 - T12 details:     Examination Method:  Tissue Texture Change, Stability, Laxity, Effusions, Tone, Asymmetry, Misalignment, Crepitation, Defects, Masses, Range of Motion, Contracture and Tenderness, Pain    Severity:  Moderate    Osteopathic Findings:  Hypertonicity of the thoracic paraspinal muscles R > L  Posterior R T10 tender point     Treatment Method:  High Velocity, Low Amplitude Treatment, Muscle Energy Treatment, Myofascial Release Treatment and Soft Tissue Treatment    Response:  Improved - The somatic dysfunction is improved but not completely resolved.     Lumbar details:     Examination Method:  Tissue Texture Change, Stability, Laxity, Effusions, Tone, Asymmetry, Misalignment, Crepitation, Defects, Masses, Range of Motion, Contracture and Tenderness, Pain    Severity:  Severe    Osteopathic Findings:  Hypertonicity of the lumbar paraspinal muscles b/l L > R   Posterior L3-4 tender point     Treatment Method:  Soft Tissue Treatment, Direct Treatment, Myofascial Release Treatment and Muscle Energy Treatment    Response:  Improved - The somatic dysfunction is improved but not completely resolved. Sacrum/Pelvis details:     Examination Method:  Tissue Texture Change, Stability, Laxity, Effusions, Tone, Tenderness, Pain, Asymmetry, Misalignment, Crepitation, Defects, Masses and Range of Motion, Contracture    Severity:  Moderate    Osteopathic Findings:  B/l SI joint tenderness     Treatment Method:  Direct Treatment, Counterstrain Treatment and High Velocity, Low Amplitude Treatment    Response:  Improved - The somatic dysfunction is improved but not completely resolved.     Total Regions Treated:  3    Thoracic T10-12:  Somatic Dysfunction:  Tissue Texture Changes, Asymmetry , Restriction, and Tenderness  Severity :  3  Osteopathic Findings: as detailed below  Treatment Method: HVLA, MFR, and CS  Response: improved  Lumbar:  Somatic Dysfunction:  Tissue Texture Changes, Asymmetry , Restriction, and Tenderness  Sacrum:  Somatic Dysfunction:  Tissue Texture Changes, Asymmetry , Restriction, and Tenderness  Severity :  3  Osteopathic Findings: as detailed below  Treatment Method: HVLA, ST, MFR, and CS  Response: improved        Daryl Choudhury, MS, OMS-III

## 2023-10-21 PROBLEM — M54.42 CHRONIC BILATERAL LOW BACK PAIN WITH BILATERAL SCIATICA: Status: ACTIVE | Noted: 2023-09-18

## 2023-10-21 PROBLEM — M54.41 CHRONIC BILATERAL LOW BACK PAIN WITH BILATERAL SCIATICA: Status: ACTIVE | Noted: 2023-09-18

## 2023-10-27 ENCOUNTER — LAB (OUTPATIENT)
Dept: LAB | Facility: AMBULARY SURGERY CENTER | Age: 73
End: 2023-10-27
Payer: MEDICARE

## 2023-10-27 DIAGNOSIS — R73.03 PREDIABETES: ICD-10-CM

## 2023-10-27 DIAGNOSIS — Z12.5 SCREENING FOR PROSTATE CANCER: ICD-10-CM

## 2023-10-27 DIAGNOSIS — Z13.6 SCREENING FOR CARDIOVASCULAR CONDITION: ICD-10-CM

## 2023-10-27 DIAGNOSIS — R35.1 NOCTURIA: ICD-10-CM

## 2023-10-27 LAB
ALBUMIN SERPL BCP-MCNC: 4.3 G/DL (ref 3.5–5)
ALP SERPL-CCNC: 46 U/L (ref 34–104)
ALT SERPL W P-5'-P-CCNC: 19 U/L (ref 7–52)
ANION GAP SERPL CALCULATED.3IONS-SCNC: 11 MMOL/L
AST SERPL W P-5'-P-CCNC: 20 U/L (ref 13–39)
BILIRUB SERPL-MCNC: 0.42 MG/DL (ref 0.2–1)
BUN SERPL-MCNC: 24 MG/DL (ref 5–25)
CALCIUM SERPL-MCNC: 9.4 MG/DL (ref 8.4–10.2)
CHLORIDE SERPL-SCNC: 102 MMOL/L (ref 96–108)
CO2 SERPL-SCNC: 27 MMOL/L (ref 21–32)
CREAT SERPL-MCNC: 1.04 MG/DL (ref 0.6–1.3)
GFR SERPL CREATININE-BSD FRML MDRD: 71 ML/MIN/1.73SQ M
GLUCOSE P FAST SERPL-MCNC: 125 MG/DL (ref 65–99)
POTASSIUM SERPL-SCNC: 4.1 MMOL/L (ref 3.5–5.3)
PROT SERPL-MCNC: 6.7 G/DL (ref 6.4–8.4)
PSA SERPL-MCNC: 0.24 NG/ML (ref 0–4)
SODIUM SERPL-SCNC: 140 MMOL/L (ref 135–147)

## 2023-10-27 PROCEDURE — G0103 PSA SCREENING: HCPCS

## 2023-10-27 PROCEDURE — 80053 COMPREHEN METABOLIC PANEL: CPT

## 2023-10-27 PROCEDURE — 36415 COLL VENOUS BLD VENIPUNCTURE: CPT

## 2023-10-29 ENCOUNTER — TELEPHONE (OUTPATIENT)
Dept: OTHER | Facility: OTHER | Age: 73
End: 2023-10-29

## 2023-10-29 ENCOUNTER — TELEPHONE (OUTPATIENT)
Dept: FAMILY MEDICINE CLINIC | Facility: OTHER | Age: 73
End: 2023-10-29

## 2023-10-29 DIAGNOSIS — E78.5 HYPERLIPIDEMIA, UNSPECIFIED HYPERLIPIDEMIA TYPE: Primary | ICD-10-CM

## 2023-10-29 NOTE — TELEPHONE ENCOUNTER
Spoke with patient about his lab results, including his elevated glucose level. Discussed lifestyle management for this. Patient wishes to avoid medications. He is motivated to reduce carbohydrate intake and exercise more once he feels better from his back pain. He is pursuing OMT and PT with medications for this. Informed patient that I ordered a new lipid panel as his previous one had . Advised the patient to get this done due to recent self decreasing of his cholesterol medication. All questions were answered.

## 2023-10-29 NOTE — TELEPHONE ENCOUNTER
Pt called in stating he missed a call from Dr. Sia Head regarding his results. Paged the dr and she said she'll call him back.

## 2023-10-29 NOTE — RESULT ENCOUNTER NOTE
Attempted to call patient on mobile and home phone. Left vm on home phone to call office back about lab results. Please call the patient to inform him that his CMP was unremarkable except for an elevated glucose of 125. Please advise him to continue reducing simple carbohydrate containing foods at this time. His PSA was within normal limits. Also, it appears his lipid panel lab order , so I will order that again, and please remind him to get this done. Thank you!     Dr. Loyde Paget

## 2023-10-30 ENCOUNTER — LAB (OUTPATIENT)
Dept: LAB | Facility: AMBULARY SURGERY CENTER | Age: 73
End: 2023-10-30
Payer: MEDICARE

## 2023-10-30 ENCOUNTER — TELEPHONE (OUTPATIENT)
Dept: PHYSICAL THERAPY | Facility: OTHER | Age: 73
End: 2023-10-30

## 2023-10-30 DIAGNOSIS — E78.5 HYPERLIPIDEMIA, UNSPECIFIED HYPERLIPIDEMIA TYPE: ICD-10-CM

## 2023-10-30 LAB
CHOLEST SERPL-MCNC: 173 MG/DL
HDLC SERPL-MCNC: 48 MG/DL
LDLC SERPL CALC-MCNC: 102 MG/DL (ref 0–100)
TRIGL SERPL-MCNC: 115 MG/DL

## 2023-10-30 PROCEDURE — 36415 COLL VENOUS BLD VENIPUNCTURE: CPT

## 2023-10-30 PROCEDURE — 80061 LIPID PANEL: CPT

## 2023-10-30 RX ORDER — ROSUVASTATIN CALCIUM 20 MG/1
20 TABLET, COATED ORAL DAILY
Qty: 180 TABLET | Refills: 1 | Status: SHIPPED | OUTPATIENT
Start: 2023-10-30

## 2023-10-30 NOTE — TELEPHONE ENCOUNTER
Patient filled out an online request form for [facility] on 10/29/23 2:09PM    I called the patient on 10/30/23 at 8:40AM, to explain comp spine. After speaking to the patient, and checking his chart the patient has a referral for PT and for PM ready to schedule, for back pain and sciatica pain. The patient was unable to write any phone numbers down since he was driving at the time. He said he will figure it out, and schedule with PT.      No comp spine ref to close

## 2023-10-30 NOTE — RESULT ENCOUNTER NOTE
Called patient to discuss lab results. Lipid panel unremarkable except for LDL cholesterol of 102. Previous LDL on 4/20/2023 was 78. Patient reported self reducing rosuvastatin from 40 mg to 20 mg. He would like to continue taking 20 mg instead and incorporate more lifestyle modifications, which we discussed. Shared decision making used and sent in new prescription of rosuvastatin 20 mg daily. Can recheck lipid panel at annual physical. Patient agreeable to plan and no other questions at this time.

## 2023-10-31 ENCOUNTER — PROCEDURE VISIT (OUTPATIENT)
Dept: FAMILY MEDICINE CLINIC | Facility: OTHER | Age: 73
End: 2023-10-31
Payer: MEDICARE

## 2023-10-31 ENCOUNTER — EVALUATION (OUTPATIENT)
Dept: PHYSICAL THERAPY | Facility: REHABILITATION | Age: 73
End: 2023-10-31
Payer: MEDICARE

## 2023-10-31 VITALS — BODY MASS INDEX: 23.11 KG/M2 | HEIGHT: 68 IN

## 2023-10-31 DIAGNOSIS — G89.29 CHRONIC BILATERAL LOW BACK PAIN WITH BILATERAL SCIATICA: ICD-10-CM

## 2023-10-31 DIAGNOSIS — M54.41 CHRONIC BILATERAL LOW BACK PAIN WITH BILATERAL SCIATICA: Primary | ICD-10-CM

## 2023-10-31 DIAGNOSIS — M54.41 CHRONIC BILATERAL LOW BACK PAIN WITH BILATERAL SCIATICA: ICD-10-CM

## 2023-10-31 DIAGNOSIS — M99.05 SOMATIC DYSFUNCTION OF PELVIS REGION: ICD-10-CM

## 2023-10-31 DIAGNOSIS — M99.03 SOMATIC DYSFUNCTION OF SPINE, LUMBAR: ICD-10-CM

## 2023-10-31 DIAGNOSIS — M54.42 CHRONIC BILATERAL LOW BACK PAIN WITH BILATERAL SCIATICA: ICD-10-CM

## 2023-10-31 DIAGNOSIS — M99.02 SOMATIC DYSFUNCTION OF SPINE, THORACIC: ICD-10-CM

## 2023-10-31 DIAGNOSIS — G89.29 CHRONIC BILATERAL LOW BACK PAIN WITH BILATERAL SCIATICA: Primary | ICD-10-CM

## 2023-10-31 DIAGNOSIS — R73.03 PREDIABETES: ICD-10-CM

## 2023-10-31 DIAGNOSIS — M54.42 CHRONIC BILATERAL LOW BACK PAIN WITH BILATERAL SCIATICA: Primary | ICD-10-CM

## 2023-10-31 LAB — SL AMB POCT GLUCOSE BLD: 126

## 2023-10-31 PROCEDURE — 82948 REAGENT STRIP/BLOOD GLUCOSE: CPT | Performed by: FAMILY MEDICINE

## 2023-10-31 PROCEDURE — 99213 OFFICE O/P EST LOW 20 MIN: CPT | Performed by: FAMILY MEDICINE

## 2023-10-31 PROCEDURE — 97161 PT EVAL LOW COMPLEX 20 MIN: CPT | Performed by: PHYSICAL THERAPIST

## 2023-10-31 PROCEDURE — 97112 NEUROMUSCULAR REEDUCATION: CPT | Performed by: PHYSICAL THERAPIST

## 2023-10-31 RX ORDER — GABAPENTIN 100 MG/1
100 CAPSULE ORAL 3 TIMES DAILY
COMMUNITY

## 2023-10-31 NOTE — PROGRESS NOTES
PT Evaluation     Today's date: 10/31/2023  Patient name: Aisha Gayle  : 1950  MRN: [id number]  Referring provider: Carlos Carlin MD  Dx:   Encounter Diagnosis     ICD-10-CM    1. Chronic bilateral low back pain with bilateral sciatica  M54.42 Ambulatory Referral to Physical Therapy    M54.41     G89.29                      Assessment  Assessment details: Aisha Gayle is a 68 y.o. male present with:   Chronic bilateral low back pain with bilateral sciatica    Joselito Fregoso has the above listed impairments and will benefit from skilled Physical Therapist management to improve deficits to return to prior level of function. Will treat Shaun via stabilization based progression as well as piriformis syndrome treatment due to irritation within left piriformis region.    Impairments: abnormal muscle firing, abnormal or restricted ROM, activity intolerance, impaired physical strength, lacks appropriate home exercise program and pain with function    Symptom irritability: lowUnderstanding of Dx/Px/POC: good   Prognosis: good    Goals  Impairment Goals  - Decrease pain 0/10  - Improve ROM to 110 degrees knee flexion via ely's  - Increase strength to 5/5 throughout    Functional Goals  - Return to Prior Level of Function  - Increase Functional Status Measure to: 72  - Patient will be independent with HEP  -Patient will be able to return to walking his dog without pain    Plan  Patient would benefit from: skilled PT  Planned therapy interventions: joint mobilization, manual therapy, patient education, postural training, activity modification, abdominal trunk stabilization, body mechanics training, flexibility, functional ROM exercises, graded exercise, home exercise program, neuromuscular re-education, strengthening, stretching, therapeutic activities and therapeutic exercise  Frequency: 2x week  Duration in weeks: 8  Plan of Care beginning date: 10/31/2023  Plan of Care expiration date: 2023  Treatment plan discussed with: patient      Subjective Evaluation    History of Present Illness  Mechanism of injury: Shaun reports chronic history of low back pain. Notes having injection placed [id number], with limited affect. Notes in the morning his back is about 2-3/10, able to walk his dog about 1 mile without issue. As the day progresses, notes his pain becomes more severe, this patient most pronounced in anterior aspect of left thigh. Did a steroid taper with great reduction of his pain the first night of full dosage and then pain came back within a few days. Has had OMT interventions performed by PCP office, improvement for a few hours however pain returns, denies any HEP. Recurrent probem    Patient Goals  Patient goals for therapy: increased strength, improved balance, increased motion, decreased pain and independence with ADLs/IADLs  Patient goal: Return to walking dog w/o pain, treadmill  Pain  Current pain rating: 3  At worst pain ratin  Quality: dull ache and tight    Social Support    Employment status: not working      Objective     Palpation   Left   Hypertonic in the piriformis. Tenderness of the piriformis. Right   No palpable tenderness to the piriformis.      Active Range of Motion     Lumbar   Flexion:  WFL  Extension:  Restriction level: minimal  Left lateral flexion:  Restriction level: minimal  Right lateral flexion:  Restriction level: minimal  Left rotation:  with pain Restriction level: minimal  Right rotation:  with pain Restriction level: minimal    Joint Play     Hypomobile: L1, L2, L3 and L4     Pain: L1, L2, L3 and L4     Strength/Myotome Testing     Lumbar   Left   Heel walk: normal  Toe walk: normal    Right   Heel walk: normal  Toe walk: normal    Left Hip   Planes of Motion   Flexion: WFL  Extension: 4  Abduction: 4-    Right Hip   Planes of Motion   Flexion: WFL  Extension: 4  Abduction: 4+    Left Knee   Flexion: WFL  Extension: WFL    Right Knee   Flexion: WFL  Extension: [place] [place]    Tests     Lumbar     Left   Positive femoral stretch. Negative crossed SLR, passive SLR and slump test.     Right   Negative crossed SLR, femoral stretch, passive SLR and slump test.     Left Pelvic Girdle/Sacrum   Negative: active SLR test.     Right Pelvic Girdle/Sacrum   Negative: active SLR test.     Left Hip   Positive FADIR and piriformis. Negative JARON. Right Hip   Negative JARON.                     Precautions: HTN      Daily Treatment Diary      Manual 10/31/23              nv                         Ther-Ex                    bike [employer] lumbar spine flx             Seated fig 4 stretch* 5x10"            MET ext L, flx r* 10x5"                         Neuro-Re-ed             ADIM* 10x5"                   ADIM marches* 2x8                   ADIM heel taps                    Bridges w march* 3x10                   Clamshells* BTB 3x10                                Low Rows w ppt nv                                       Standing ppt             Ther-Act             Leg Press                                       Goblet squat w ppt nv                   Pallof Press w ppt                                        Jesse Mendoza walks w PPT                                                                                                                                                                                             Modalities                                                 *=on hep  [id number]@

## 2023-10-31 NOTE — PROGRESS NOTES
The Assessment/Plan     This is a 68 y.o. male who presents for OMT follow-up for:  1. Chronic bilateral low back pain with bilateral sciatica        2. Somatic dysfunction of spine, lumbar        3. Somatic dysfunction of spine, thoracic        4. Somatic dysfunction of pelvis region        5. Prediabetes  POCT blood glucose           1. Patient tolerated OMT well for the above problems,  advised patient to drink fluids and can use NSAID for soreness after treatment     2. OMT Follow up in 2 weeks. Perla Carlson is a 68 y.o. male and is here for a OMT follow up. The patient reports   Ongoing low back pain that radiates to the front of his thigh on the left. Is the patient taking Pain medication? yes  Has the patient completed physical therapy for this condition? no  Did Patient symptoms improve from last OMT appointment? no    The following portions of the patient's history were reviewed and updated as appropriate: allergies, current medications, past family history, past medical history, past social history, past surgical history, and problem list.    Review of Systems  Review of Systems   Gastrointestinal:  Negative for diarrhea. Genitourinary:  Negative for urgency. Musculoskeletal:  Positive for back pain and myalgias. Skin:  Negative for color change and rash. Neurological:  Negative for dizziness, weakness, light-headedness and headaches. Objective     OMT Exam     OMT    Performed by: Kevin Balderas DO  Authorized by: Kevin Balderas DO  Universal Protocol:  Procedure performed by:  Consent: Verbal consent obtained.   Patient identity confirmed: verbally with patient      Procedure Details:     Region evaluated and treated:  Thoracic, Lumbar and Sacrum/Pelvis    Thoracic Information  Thoracic Region: T10 - T12  Thoracic T10 - T12 details:     Examination Method:  Asymmetry, Misalignment, Crepitation, Defects, Masses, Tissue Texture Change, Stability, Laxity, Effusions, Tone, Range of Motion, Contracture and Tenderness, Pain    Severity:  Severe    Osteopathic Findings:  - T10-T12 paraspinal muscle hypertonicity L > R    Treatment Method:  Muscle Energy Treatment, Myofascial Release Treatment, Soft Tissue Treatment and High Velocity, Low Amplitude Treatment    Response:  Improved - The somatic dysfunction is improved but not completely resolved. Lumbar details:     Examination Method:  Tissue Texture Change, Stability, Laxity, Effusions, Tone, Asymmetry, Misalignment, Crepitation, Defects, Masses, Range of Motion, Contracture and Tenderness, Pain    Severity:  Severe    Osteopathic Findings:  - Left lateral L4/UPL5 tender point     Treatment Method:  Counterstrain Treatment, Muscle Energy Treatment and Myofascial Release Treatment    Response:  Resolved - The somatic dysfunction is completely resolved without evidence of it ever having been present. Sacrum/Pelvis details:     Examination Method:  Tissue Texture Change, Stability, Laxity, Effusions, Tone, Asymmetry, Misalignment, Crepitation, Defects, Masses, Range of Motion, Contracture and Tenderness, Pain    Severity:  Moderate    Osteopathic Findings:  - SI joint tenderness L > R     Treatment Method:  Articulatory Treatment, Counterstrain Treatment and Ligamentous Articular Strain, Balanced Ligamentous Tension Treatment    Response:  Improved - The somatic dysfunction is improved but not completely resolved. Total Regions Treated:  3  Attending provider present in exam room for procedure:  No

## 2023-11-02 ENCOUNTER — OFFICE VISIT (OUTPATIENT)
Dept: PHYSICAL THERAPY | Facility: REHABILITATION | Age: 73
End: 2023-11-02
Payer: MEDICARE

## 2023-11-02 DIAGNOSIS — M54.41 CHRONIC BILATERAL LOW BACK PAIN WITH BILATERAL SCIATICA: Primary | ICD-10-CM

## 2023-11-02 DIAGNOSIS — M54.42 CHRONIC BILATERAL LOW BACK PAIN WITH BILATERAL SCIATICA: Primary | ICD-10-CM

## 2023-11-02 DIAGNOSIS — G89.29 CHRONIC BILATERAL LOW BACK PAIN WITH BILATERAL SCIATICA: Primary | ICD-10-CM

## 2023-11-02 PROCEDURE — 97110 THERAPEUTIC EXERCISES: CPT | Performed by: PHYSICAL THERAPIST

## 2023-11-02 PROCEDURE — 97140 MANUAL THERAPY 1/> REGIONS: CPT | Performed by: PHYSICAL THERAPIST

## 2023-11-02 NOTE — PROGRESS NOTES
Daily Note     Today's date: 2023  Patient name: Benji Villegas  : 1950  MRN: [id number]  Referring provider: Tim Nguyen  Dx:   Encounter Diagnosis     ICD-10-CM    1. Chronic bilateral low back pain with bilateral sciatica  M54.42     M54.41     G89.29           Start Time: 1400  Stop Time: 1445  Total time in clinic (min): 45 minutes    Subjective: Shaun reports that his back is okay today. Notes being sore after last OMT session with PCP for about a day, but notes feeling okay now. Objective: See treatment diary below      Assessment: Tolerated treatment well. Patient demonstrated fatigue post treatment, exhibited good technique with therapeutic exercises, and would benefit from continued PT. Plan: Continue per plan of care.                Precautions: HTN      Daily Treatment Diary      Manual 10/31/23  11           TPR R Piriformis nv 8'                        Ther-Ex                    Bike (endurance) nv nv                  [employer] lumbar spine flx             Seated fig 4 stretch* 5x10" Supine 5x10"x2           MET ext L, flx r* 10x5"            Prone quad stretch rocky  3x30" ea                         Neuro-Re-ed             ADIM* 10x5"                   ADIM marches* 2x8 dc                  ADIM heel taps  3x8                  Bridges w march* 3x10 3x10                   Clamshells* BTB 3x10 BTB 3x12                  Lateral band walks  GTB 3 laps           Low Rows w ppt nv                                       Standing ppt             Ther-Act             Leg Press               Step ups w fwd lean for glute drive   2L81 8"                        Goblet squat w ppt nv nv                  Pallof Press w ppt                                        Marcellus Elif walks w PPT                                                                                                                                                                                             Modalities *=on hep  [email or web address]@

## 2023-11-06 ENCOUNTER — OFFICE VISIT (OUTPATIENT)
Dept: PHYSICAL THERAPY | Facility: REHABILITATION | Age: 73
End: 2023-11-06
Payer: MEDICARE

## 2023-11-06 DIAGNOSIS — G89.29 CHRONIC BILATERAL LOW BACK PAIN WITH BILATERAL SCIATICA: Primary | ICD-10-CM

## 2023-11-06 DIAGNOSIS — M54.41 CHRONIC BILATERAL LOW BACK PAIN WITH BILATERAL SCIATICA: Primary | ICD-10-CM

## 2023-11-06 DIAGNOSIS — M54.42 CHRONIC BILATERAL LOW BACK PAIN WITH BILATERAL SCIATICA: Primary | ICD-10-CM

## 2023-11-06 PROCEDURE — 97110 THERAPEUTIC EXERCISES: CPT | Performed by: PHYSICAL THERAPIST

## 2023-11-06 PROCEDURE — 97530 THERAPEUTIC ACTIVITIES: CPT | Performed by: PHYSICAL THERAPIST

## 2023-11-06 NOTE — PROGRESS NOTES
Daily Note     Today's date: 2023  Patient name: Moses Lindsey  : 1950  MRN: [id number]  Referring provider: Kierra Walters*  Dx:   Encounter Diagnosis     ICD-10-CM    1. Chronic bilateral low back pain with bilateral sciatica  M54.42     M54.41     G89.29                      Subjective: Shaun reports that his back feels okay, notes it was sore with extra activity this weekend but "Not as bad" today. Objective: See treatment diary below      Assessment: Tolerated treatment well. Patient demonstrated fatigue post treatment, exhibited good technique with therapeutic exercises, and would benefit from continued PT. Plan: Continue per plan of care.          Precautions: HTN      Daily Treatment Diary      Manual 10/31/23  11/2 11/6          TPR R Piriformis nv 8'                        Ther-Ex                    Bike (endurance) nv nv 5' lvl 1                 Ball Rolls lumbar spine flx             Seated fig 4 stretch* 5x10" Supine 5x10"x2           MET ext L, flx r* 10x5"            Prone quad stretch rocky  3x30" ea  3x30" ea                       Neuro-Re-ed             ADIM* 10x5"                   ADIM marches* 2x8 dc                  ADIM heel taps  3x8 3x10                 Bridges w march* 3x10 3x10  3x12                 Clamshells* BTB 3x10 BTB 3x12 BTB 3x12                 Lateral band walks  GTB 3 laps GTB 3 laps          Low Rows w ppt nv                                       Standing ppt             Ther-Act             Leg Press               Step ups w fwd lean for glute drive   6U11 8" 5U72 8"                       Goblet squat w ppt nv nv 3x10 15#                 Pallof Press w ppt                                        Tequila Booty walks w PPT                                                                                                                                                                                             Modalities *=on hep  [email or web address]@

## 2023-11-16 ENCOUNTER — OFFICE VISIT (OUTPATIENT)
Dept: PHYSICAL THERAPY | Facility: REHABILITATION | Age: 73
End: 2023-11-16
Payer: MEDICARE

## 2023-11-16 DIAGNOSIS — M54.42 CHRONIC BILATERAL LOW BACK PAIN WITH BILATERAL SCIATICA: Primary | ICD-10-CM

## 2023-11-16 DIAGNOSIS — M54.41 CHRONIC BILATERAL LOW BACK PAIN WITH BILATERAL SCIATICA: Primary | ICD-10-CM

## 2023-11-16 DIAGNOSIS — G89.29 CHRONIC BILATERAL LOW BACK PAIN WITH BILATERAL SCIATICA: Primary | ICD-10-CM

## 2023-11-16 PROCEDURE — 97110 THERAPEUTIC EXERCISES: CPT | Performed by: PHYSICAL THERAPIST

## 2023-11-16 PROCEDURE — 97530 THERAPEUTIC ACTIVITIES: CPT | Performed by: PHYSICAL THERAPIST

## 2023-11-16 NOTE — PROGRESS NOTES
Daily Note     Today's date: 2023  Patient name: Staci Suarez  : 1950  MRN: [id number]  Referring provider: Vibha Choi*  Dx:   Encounter Diagnosis     ICD-10-CM    1. Chronic bilateral low back pain with bilateral sciatica  M54.42     M54.41     G89.29                      Subjective: Mehdi Cordero reports that his low back is sore. Continues to have discomfort when walking his dog. Objective: See treatment diary below      Assessment: Tolerated treatment well. Patient demonstrated fatigue post treatment, exhibited good technique with therapeutic exercises, and would benefit from continued PT. Plan: Continue per plan of care.          Precautions: HTN      Daily Treatment Diary      Manual 10/31/23  11/2 11/6 11/16         TPR R Piriformis nv 8'                        Ther-Ex                    Bike (endurance) nv nv 5' lvl 1  5' lvl 1               Ball Rolls lumbar spine flx             Seated fig 4 stretch* 5x10" Supine 5x10"x2           MET ext L, flx r* 10x5"            Prone quad stretch rocky  3x30" ea  3x30" ea 3x30" ea                      Neuro-Re-ed             ADIM* 10x5"                   ADIM marches* 2x8 dc                  ADIM heel taps  3x8 3x10  3x10  dc             ADIM bicycles     nv        Bridges w march* 3x10 3x10  3x12  3x12               Clamshells* BTB 3x10 BTB 3x12 BTB 3x12  np               Lateral band walks  GTB 3 laps GTB 3 laps GTB 3 laps Inc resist        Low Rows w ppt nv                                       Standing ppt             Ther-Act             Leg Press               Step ups w fwd lean for glute drive   3S04 8" 8U83 8" 2x10 8"                      Goblet squat w ppt nv nv 3x10 15#  3x10 15#               Pallof Press w ppt                                        [place] Grosser walks w PPT Modalities                                                 *=on [facility]  [email or web address]@

## 2023-11-20 ENCOUNTER — PROCEDURE VISIT (OUTPATIENT)
Dept: FAMILY MEDICINE CLINIC | Facility: OTHER | Age: 73
End: 2023-11-20
Payer: MEDICARE

## 2023-11-20 DIAGNOSIS — M99.02 SOMATIC DYSFUNCTION OF SPINE, THORACIC: ICD-10-CM

## 2023-11-20 DIAGNOSIS — M19.90 ARTHRITIS: ICD-10-CM

## 2023-11-20 DIAGNOSIS — M54.41 CHRONIC BILATERAL LOW BACK PAIN WITH BILATERAL SCIATICA: Primary | ICD-10-CM

## 2023-11-20 DIAGNOSIS — G89.29 CHRONIC BILATERAL LOW BACK PAIN WITH BILATERAL SCIATICA: Primary | ICD-10-CM

## 2023-11-20 DIAGNOSIS — M54.42 CHRONIC BILATERAL LOW BACK PAIN WITH BILATERAL SCIATICA: Primary | ICD-10-CM

## 2023-11-20 DIAGNOSIS — M99.03 SOMATIC DYSFUNCTION OF SPINE, LUMBAR: ICD-10-CM

## 2023-11-20 DIAGNOSIS — M99.05 SOMATIC DYSFUNCTION OF PELVIS REGION: ICD-10-CM

## 2023-11-20 DIAGNOSIS — M54.16 LEFT LUMBAR RADICULITIS: ICD-10-CM

## 2023-11-20 PROCEDURE — 99213 OFFICE O/P EST LOW 20 MIN: CPT | Performed by: FAMILY MEDICINE

## 2023-11-20 PROCEDURE — 98926 OSTEOPATH MANJ 3-4 REGIONS: CPT | Performed by: FAMILY MEDICINE

## 2023-11-20 RX ORDER — PREDNISONE 20 MG/1
TABLET ORAL
Qty: 11 TABLET | Refills: 0 | Status: SHIPPED | OUTPATIENT
Start: 2023-11-20 | End: 2023-11-29

## 2023-11-20 RX ORDER — GABAPENTIN 300 MG/1
300 CAPSULE ORAL 3 TIMES DAILY
Qty: 90 CAPSULE | Refills: 0 | Status: SHIPPED | OUTPATIENT
Start: 2023-11-20 | End: 2023-12-20

## 2023-11-20 NOTE — PROGRESS NOTES
The Assessment/Plan     This is a 68 y.o. male who presents for OMT follow-up for:  1. Chronic bilateral low back pain with bilateral sciatica  gabapentin (Neurontin) 300 mg capsule    OMT      2. Arthritis  diclofenac sodium (VOLTAREN) 50 mg EC tablet    OMT      3. Left lumbar radiculitis  predniSONE 20 mg tablet    OMT      4. Somatic dysfunction of spine, thoracic  OMT      5. Somatic dysfunction of spine, lumbar  OMT      6. Somatic dysfunction of pelvis region  OMT           1. Patient tolerated OMT well for the above problems,  advised patient to drink fluids and can use NSAID for soreness after treatment     2. OMT Follow up in 2 weeks. Rosendo Tran is a 68 y.o. male and is here for a OMT follow up. The patient reports notes starting PT and notes some improvement in symptoms but no resolution. He reports pain comes on when walking; "the more I walk, the worse it gets". Patient noting the most relief with medrol dose pack he was prescribed previously. Is the patient taking Pain medication? yes  Has the patient completed physical therapy for this condition? Currently undertaking  Did Patient symptoms improve from last OMT appointment? yes    The following portions of the patient's history were reviewed and updated as appropriate: allergies, current medications, past family history, past medical history, past social history, past surgical history, and problem list.    Review of Systems  Review of Systems   Cardiovascular:  Negative for chest pain. Gastrointestinal:  Negative for diarrhea. Genitourinary:  Negative for flank pain. Musculoskeletal:  Positive for back pain and myalgias. Negative for joint swelling and neck pain. Neurological:  Negative for dizziness, weakness, light-headedness and headaches.          Objective     OMT Exam     OMT    Performed by: Norma Wynn DO  Authorized by: Norma Wynn DO  Universal Protocol:  Procedure performed by:  Consent: Verbal consent obtained. Consent given by: patient  Patient identity confirmed: verbally with patient      Procedure Details:     Region evaluated and treated:  Thoracic, Lumbar and Sacrum/Pelvis    Thoracic Information  Thoracic Region: T10 - T12  Thoracic T10 - T12 details:     Examination Method:  Tissue Texture Change, Stability, Laxity, Effusions, Tone, Asymmetry, Misalignment, Crepitation, Defects, Masses, Range of Motion, Contracture and Tenderness, Pain    Severity:  Moderate    Osteopathic Findings:  B/l paraspinal muscle hypertonicity L > R    Treatment Method:  Soft Tissue Treatment, Myofascial Release Treatment, Muscle Energy Treatment and High Velocity, Low Amplitude Treatment    Response:  Improved - The somatic dysfunction is improved but not completely resolved. Lumbar details:     Examination Method:  Tissue Texture Change, Stability, Laxity, Effusions, Tone, Asymmetry, Misalignment, Crepitation, Defects, Masses, Range of Motion, Contracture and Tenderness, Pain    Severity:  Severe    Osteopathic Findings:  Bilateral L4/UPL5 tender point    Treatment Method:  Counterstrain Treatment, Myofascial Release Treatment, Muscle Energy Treatment, Soft Tissue Treatment and High Velocity, Low Amplitude Treatment    Response:  Improved - The somatic dysfunction is improved but not completely resolved. Sacrum/Pelvis details:     Examination Method:  Tissue Texture Change, Stability, Laxity, Effusions, Tone, Asymmetry, Misalignment, Crepitation, Defects, Masses, Range of Motion, Contracture and Tenderness, Pain    Severity:  Mild    Osteopathic Findings:  B/l SI joint tenderness     Treatment Method:  Soft Tissue Treatment, Ligamentous Articular Strain, Balanced Ligamentous Tension Treatment and Counterstrain Treatment    Response:  Improved - The somatic dysfunction is improved but not completely resolved.     Total Regions Treated:  3  Attending provider present in exam room for procedure: Yes

## 2023-11-21 ENCOUNTER — OFFICE VISIT (OUTPATIENT)
Dept: PHYSICAL THERAPY | Facility: REHABILITATION | Age: 73
End: 2023-11-21
Payer: MEDICARE

## 2023-11-21 DIAGNOSIS — M54.42 CHRONIC BILATERAL LOW BACK PAIN WITH BILATERAL SCIATICA: Primary | ICD-10-CM

## 2023-11-21 DIAGNOSIS — G89.29 CHRONIC BILATERAL LOW BACK PAIN WITH BILATERAL SCIATICA: Primary | ICD-10-CM

## 2023-11-21 DIAGNOSIS — M54.41 CHRONIC BILATERAL LOW BACK PAIN WITH BILATERAL SCIATICA: Primary | ICD-10-CM

## 2023-11-21 PROCEDURE — 97530 THERAPEUTIC ACTIVITIES: CPT | Performed by: PHYSICAL THERAPIST

## 2023-11-21 PROCEDURE — 97110 THERAPEUTIC EXERCISES: CPT | Performed by: PHYSICAL THERAPIST

## 2023-11-21 NOTE — PROGRESS NOTES
Daily Note     Today's date: 2023  Patient name: Aniceto Moritz  : 1950  MRN: [id number]  Referring provider: Gasper Quinteros*  Dx:   Encounter Diagnosis     ICD-10-CM    1. 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 Denies any other issues at this time. Objective: See treatment diary below      Assessment: Tolerated treatment well. Patient demonstrated fatigue post treatment, exhibited good technique with therapeutic exercises, and would benefit from continued PT. Noted feeling looser by the end of the evening. Plan: Continue per plan of care.          Precautions: HTN      Daily Treatment Diary      Manual 10/31/23  11/2 11/6 11/16 11/21        TPR R Piriformis nv 8'                        Ther-Ex                    Bike (endurance) nv nv 5' lvl 1  5' lvl 1  5' lvl 1             Ball Rolls lumbar spine flx             Seated fig 4 stretch* 5x10" Supine 5x10"x2   4x15s        MET ext L, flx r* 10x5"            Prone quad stretch rocky  3x30" ea  3x30" ea 3x30" ea 4x30"                     Neuro-Re-ed             ADIM* 10x5"                   ADIM marches* 2x8 dc                  ADIM heel taps  3x8 3x10  3x10  dc             ADIM bicycles     nv        Bridges w march* 3x10 3x10  3x12  3x12  3x12             Clamshells* BTB 3x10 BTB 3x12 BTB 3x12  np  Purple 3x10             Lateral band walks  GTB 3 laps GTB 3 laps GTB 3 laps BTB 3 laps        Low Rows w ppt nv                                       Standing ppt             Ther-Act             Leg Press               Step ups w fwd lean for glute drive   9F93 8" 8C41 8" 2x10 8" 3x10 8"                     Goblet squat w ppt nv nv 3x10 15#  3x10 15#  3x10 15#             Pallof Press w ppt Bertrand ramirez w PPT                                                                                                                                                                                             Modalities                                                 *=on hep  [email or web address]@

## 2023-11-23 DIAGNOSIS — I10 BENIGN ESSENTIAL HYPERTENSION: ICD-10-CM

## 2023-11-24 ENCOUNTER — TELEPHONE (OUTPATIENT)
Dept: FAMILY MEDICINE CLINIC | Facility: OTHER | Age: 73
End: 2023-11-24

## 2023-11-24 RX ORDER — AMLODIPINE BESYLATE 2.5 MG/1
2.5 TABLET ORAL DAILY
Qty: 90 TABLET | Refills: 1 | Status: SHIPPED | OUTPATIENT
Start: 2023-11-24

## 2023-11-24 NOTE — TELEPHONE ENCOUNTER
Patient came in and said he would like to the methylprednisolone 4 mg dospak since that works better for him then then prednisone 20 mg    He does have the prescription for the prednisone and he is going to finish it up but again he would like to have the other medication     I did explain he may have to come in for an appointment to discuss the change    Please advise   Thank you

## 2023-11-28 ENCOUNTER — OFFICE VISIT (OUTPATIENT)
Dept: PHYSICAL THERAPY | Facility: REHABILITATION | Age: 73
End: 2023-11-28
Payer: MEDICARE

## 2023-11-28 DIAGNOSIS — M54.41 CHRONIC BILATERAL LOW BACK PAIN WITH BILATERAL SCIATICA: Primary | ICD-10-CM

## 2023-11-28 DIAGNOSIS — M54.42 CHRONIC BILATERAL LOW BACK PAIN WITH BILATERAL SCIATICA: Primary | ICD-10-CM

## 2023-11-28 DIAGNOSIS — G89.29 CHRONIC BILATERAL LOW BACK PAIN WITH BILATERAL SCIATICA: Primary | ICD-10-CM

## 2023-11-28 PROCEDURE — 97530 THERAPEUTIC ACTIVITIES: CPT | Performed by: PHYSICAL THERAPIST

## 2023-11-28 PROCEDURE — 97110 THERAPEUTIC EXERCISES: CPT | Performed by: PHYSICAL THERAPIST

## 2023-11-28 NOTE — PROGRESS NOTES
Daily Note     Today's date: 2023  Patient name: Joyce Rice  : 1950  MRN: [id number]  Referring provider: Jada Grande*  Dx:   Encounter Diagnosis     ICD-10-CM    1. Chronic bilateral low back pain with bilateral sciatica  M54.42     M54.41     G89.29                      Subjective: Shaun reports that his back continues to bother him with walking his dog and relief when sitting down. Notes taking different medication which has helped      Objective: See treatment diary below      Assessment: Tolerated treatment well. Patient demonstrated fatigue post treatment, exhibited good technique with therapeutic exercises, and would benefit from continued PT      Plan: Continue per plan of care.          Precautions: HTN      Daily Treatment Diary      Manual 10/31/23  11/2 11/6 11/16 11/21 11/28       TPR R Piriformis nv 8'                        Ther-Ex                    Bike (endurance) nv nv 5' lvl 1  5' lvl 1  5' lvl 1  5' lvl 1           Seated lumbar spine flx hands on ankles*      5x10s       Seated fig 4 stretch* 5x10" Supine 5x10"x2   4x15s 4x15 s       MET ext L, flx r* 10x5"            Prone quad stretch rocky  3x30" ea  3x30" ea 3x30" ea 4x30" 4x30"                    Neuro-Re-ed             ADIM* 10x5"                   ADIM marches* 2x8 dc                  ADIM heel taps  3x8 3x10  3x10  dc             ADIM bicycles     nv        Bridges w march* 3x10 3x10  3x12  3x12  3x12  3x12           Clamshells* BTB 3x10 BTB 3x12 BTB 3x12  np  Purple 3x10  Purple 3x10           Lateral band walks  GTB 3 laps GTB 3 laps GTB 3 laps BTB 3 laps BTB 4 laps       Low Rows w ppt nv                                       Standing ppt             Ther-Act             Leg Press               Step ups w fwd lean for glute drive   2M71 8" 7L17 8" 2x10 8" 3x10 8" 3x12 8"                    Goblet squat w ppt nv nv 3x10 15#  3x10 15#  3x10 15#  3x12 15#           Pallof Press w ppt Bertrand ramirez w PPT                                                                                                                                                                                             Modalities                                                 *=on hep  [id number]@

## 2023-11-28 NOTE — TELEPHONE ENCOUNTER
Patient was placed on a higher taper of prednisone than is available in a medrol dose pack given his desire for longer term of relief. Placing him on a medrol dose pack at this point would provide no further relief in this context.

## 2023-12-05 ENCOUNTER — OFFICE VISIT (OUTPATIENT)
Dept: PHYSICAL THERAPY | Facility: REHABILITATION | Age: 73
End: 2023-12-05
Payer: MEDICARE

## 2023-12-05 DIAGNOSIS — M54.42 CHRONIC BILATERAL LOW BACK PAIN WITH BILATERAL SCIATICA: Primary | ICD-10-CM

## 2023-12-05 DIAGNOSIS — G89.29 CHRONIC BILATERAL LOW BACK PAIN WITH BILATERAL SCIATICA: Primary | ICD-10-CM

## 2023-12-05 DIAGNOSIS — M54.41 CHRONIC BILATERAL LOW BACK PAIN WITH BILATERAL SCIATICA: Primary | ICD-10-CM

## 2023-12-05 PROCEDURE — 97112 NEUROMUSCULAR REEDUCATION: CPT | Performed by: PHYSICAL THERAPIST

## 2023-12-05 PROCEDURE — 97110 THERAPEUTIC EXERCISES: CPT | Performed by: PHYSICAL THERAPIST

## 2023-12-05 PROCEDURE — 97530 THERAPEUTIC ACTIVITIES: CPT | Performed by: PHYSICAL THERAPIST

## 2023-12-05 NOTE — PROGRESS NOTES
Daily Note     Today's date: 2023  Patient name: Daryl Quan  : 1950  MRN: [id number]  Referring provider: Adithya Palmer*  Dx:   Encounter Diagnosis     ICD-10-CM    1. Chronic bilateral low back pain with bilateral sciatica  M54.42     M54.41     G89.29                      Subjective: Jarvis Villalpando reports that his back is about the same, denies changes with ambulation even when performing the seated stretches prior to. Objective: See treatment diary below      Assessment: Tolerated treatment well. Patient demonstrated fatigue post treatment, exhibited good technique with therapeutic exercises, and would benefit from continued PT. Plan: Continue per plan of care.          Precautions: HTN      Daily Treatment Diary      Manual 10/31/23  11/2 11/6 11/16 11/21 11/28 12/5      TPR R Piriformis nv 8'                        Ther-Ex                    Bike (endurance) nv nv 5' lvl 1  5' lvl 1  5' lvl 1  5' lvl 1  5' lvl 1         Seated lumbar spine flx hands on ankles*      5x10s 5x10s      Seated fig 4 stretch* 5x10" Supine 5x10"x2   4x15s 4x15 s Supine 5x10s      MET ext L, flx r* 10x5"            Prone quad stretch rocky  3x30" ea  3x30" ea 3x30" ea 4x30" 4x30" 4x30s                   Neuro-Re-ed             ADIM* 10x5"                   ADIM marches* 2x8 dc                  ADIM heel taps  3x8 3x10  3x10  dc             ADIM bicycles     nv        Bridges w march* 3x10 3x10  3x12  3x12  3x12  3x12  3x12         Clamshells* BTB 3x10 BTB 3x12 BTB 3x12  np  Purple 3x10  Purple 3x10  Purple 3x10         Lateral band walks  GTB 3 laps GTB 3 laps GTB 3 laps BTB 3 laps BTB 4 laps BTB 4 laps      Low Rows w ppt nv                                       Standing ppt             Ther-Act             Leg Press               Step ups w fwd lean for glute drive   1J77 8" 4P64 8" 2x10 8" 3x10 8" 3x12 8" 3x12 8" w high knee                   Goblet squat w ppt nv nv 3x10 15#  3x10 15#  3x10 15#  3x12 15#  3x12 15#         Pallof Press w ppt                                        Hermelindo Dickinson walks w PPT                                                                                                                                                                                             Modalities                                                 *=on hep  [email or web address]@

## 2023-12-11 ENCOUNTER — OFFICE VISIT (OUTPATIENT)
Dept: FAMILY MEDICINE CLINIC | Facility: OTHER | Age: 73
End: 2023-12-11
Payer: MEDICARE

## 2023-12-11 DIAGNOSIS — M99.02 SOMATIC DYSFUNCTION OF SPINE, THORACIC: ICD-10-CM

## 2023-12-11 DIAGNOSIS — G89.29 CHRONIC BILATERAL LOW BACK PAIN WITH BILATERAL SCIATICA: Primary | ICD-10-CM

## 2023-12-11 DIAGNOSIS — M99.03 SOMATIC DYSFUNCTION OF SPINE, LUMBAR: ICD-10-CM

## 2023-12-11 DIAGNOSIS — M99.05 SOMATIC DYSFUNCTION OF PELVIS REGION: ICD-10-CM

## 2023-12-11 DIAGNOSIS — M54.41 CHRONIC BILATERAL LOW BACK PAIN WITH BILATERAL SCIATICA: Primary | ICD-10-CM

## 2023-12-11 DIAGNOSIS — M54.42 CHRONIC BILATERAL LOW BACK PAIN WITH BILATERAL SCIATICA: Primary | ICD-10-CM

## 2023-12-11 PROCEDURE — 99213 OFFICE O/P EST LOW 20 MIN: CPT | Performed by: FAMILY MEDICINE

## 2023-12-11 NOTE — PROGRESS NOTES
The Assessment/Plan     This is a 73 y.o. male who presents for OMT follow-up for:  1. Chronic bilateral low back pain with bilateral sciatica  OMT      2. Somatic dysfunction of spine, thoracic  OMT      3. Somatic dysfunction of spine, lumbar  OMT      4. Somatic dysfunction of pelvis region  OMT           1. Patient tolerated OMT well for the above problems,  advised patient to drink fluids and can use NSAID for soreness after treatment     2. OMT Follow up in 2 weeks.    Subjective     Shaun Mohr is a 73 y.o. male and is here for a OMT follow up. The patient reports back pain is still present. As the day progresses the pain becomes more apparent. HE notes sometimes when walking the dog in the evening he cannot get around the block. 8-9/10 pain. Getting the pain most evenings 7/10 most times 5/10.     Is the patient taking Pain medication? yes  Has the patient completed physical therapy for this condition? no  Did Patient symptoms improve from last OMT appointment?  Temporarily     The following portions of the patient's history were reviewed and updated as appropriate: allergies, current medications, past family history, past medical history, past social history, past surgical history, and problem list.    Review of Systems  Review of Systems   Constitutional:  Negative for chills and fever.   HENT:  Negative for ear pain and sore throat.    Eyes:  Negative for pain and visual disturbance.   Respiratory:  Negative for cough and shortness of breath.    Cardiovascular:  Negative for chest pain and palpitations.   Gastrointestinal:  Negative for abdominal pain and vomiting.   Genitourinary:  Negative for dysuria and hematuria.   Musculoskeletal:  Positive for back pain and myalgias. Negative for arthralgias.   Skin:  Negative for color change and rash.   Neurological:  Negative for dizziness, weakness, light-headedness and headaches.   All other systems reviewed and are negative.        Objective     OMT Exam      OMT    Performed by: Fan Cortes DO  Authorized by: Fan Cortes DO  Universal Protocol:  Procedure performed by:  Consent: Verbal consent obtained.      Procedure Details:     Region evaluated and treated:  Lumbar, Thoracic and Sacrum/Pelvis    Thoracic Information  Thoracic Region: T10 - T12  Thoracic T10 - T12 details:     Examination Method:  Tissue Texture Change, Stability, Laxity, Effusions, Tone, Asymmetry, Misalignment, Crepitation, Defects, Masses, Range of Motion, Contracture and Tenderness, Pain    Severity:  Moderate    Osteopathic Findings:  B/l paraspinal muscle hypertonicity L > R  T10-T12 left tender point     Treatment Method:  Myofascial Release Treatment, Soft Tissue Treatment and High Velocity, Low Amplitude Treatment    Response:  Improved - The somatic dysfunction is improved but not completely resolved.    Lumbar details:     Examination Method:  Tissue Texture Change, Stability, Laxity, Effusions, Tone, Asymmetry, Misalignment, Crepitation, Defects, Masses, Range of Motion, Contracture and Tenderness, Pain    Severity:  Moderate    Osteopathic Findings:   Bilateral L4/UPL5 tender point    Treatment Method:  Myofascial Release Treatment, Soft Tissue Treatment and High Velocity, Low Amplitude Treatment    Response:  Improved - The somatic dysfunction is improved but not completely resolved.    Sacrum/Pelvis details:     Examination Method:  Tissue Texture Change, Stability, Laxity, Effusions, Tone, Asymmetry, Misalignment, Crepitation, Defects, Masses, Tenderness, Pain and Range of Motion, Contracture    Severity:  Mild    Osteopathic Findings:  B/l SI joint tenderness     Treatment Method:  Direct Treatment, Myofascial Release Treatment and Soft Tissue Treatment    Response:  Improved - The somatic dysfunction is improved but not completely resolved.    Total Regions Treated:  3  Attending provider present in exam room for procedure: Yes

## 2023-12-12 ENCOUNTER — OFFICE VISIT (OUTPATIENT)
Dept: PHYSICAL THERAPY | Facility: REHABILITATION | Age: 73
End: 2023-12-12
Payer: MEDICARE

## 2023-12-12 DIAGNOSIS — M54.42 CHRONIC BILATERAL LOW BACK PAIN WITH BILATERAL SCIATICA: Primary | ICD-10-CM

## 2023-12-12 DIAGNOSIS — G89.29 CHRONIC BILATERAL LOW BACK PAIN WITH BILATERAL SCIATICA: Primary | ICD-10-CM

## 2023-12-12 DIAGNOSIS — M54.41 CHRONIC BILATERAL LOW BACK PAIN WITH BILATERAL SCIATICA: Primary | ICD-10-CM

## 2023-12-12 PROCEDURE — 97110 THERAPEUTIC EXERCISES: CPT | Performed by: PHYSICAL THERAPIST

## 2023-12-12 PROCEDURE — 97530 THERAPEUTIC ACTIVITIES: CPT | Performed by: PHYSICAL THERAPIST

## 2023-12-12 PROCEDURE — 97112 NEUROMUSCULAR REEDUCATION: CPT | Performed by: PHYSICAL THERAPIST

## 2023-12-12 NOTE — PROGRESS NOTES
Daily Note     Today's date: 2023  Patient name: Peggy Sears  : 1950  MRN: [id number]  Referring provider: Valjean Gosselin*  Dx:   Encounter Diagnosis     ICD-10-CM    1. Chronic bilateral low back pain with bilateral sciatica  M54.42     M54.41     G89.29           Start Time: 0915  Stop Time: 1000  Total time in clinic (min): 45 minutes    Subjective: Shaun reports that his back still bothers him on the left side with prolonged standing and ambulation. Objective: See treatment diary below      Assessment: Tolerated treatment well. Patient demonstrated fatigue post treatment, exhibited good technique with therapeutic exercises, and would benefit from continued PT. Progressed quadriceps stretching this session. Will be seeing pain mgmt . Plan: Continue per plan of care.          Precautions: HTN      Daily Treatment Diary      Manual 10/31/23  11/2 11/6 11/16 11/21 11/28 12/5 12/12     TPR R Piriformis nv 8'                        Ther-Ex                    Bike (endurance) nv nv 5' lvl 1  5' lvl 1  5' lvl 1  5' lvl 1  5' lvl 1  5' lvl 1       Seated lumbar spine flx hands on ankles*      5x10s 5x10s np     Seated fig 4 stretch* 5x10" Supine 5x10"x2   4x15s 4x15 s Supine 5x10s Supine 5x10s     MET ext L, flx r* 10x5"            Prone quad stretch rocky  3x30" ea  3x30" ea 3x30" ea 4x30" 4x30" 4x30s 4x30s w half foam                  Neuro-Re-ed             ADIM* 10x5"                   ADIM marches* 2x8 dc                  ADIM heel taps  3x8 3x10  3x10  dc             ADIM bicycles     nv        Bridges w march* 3x10 3x10  3x12  3x12  3x12  3x12  3x12  3x12       Clamshells* BTB 3x10 BTB 3x12 BTB 3x12  np  Purple 3x10  Purple 3x10  Purple 3x10  Purple 3x10       Lateral band walks  GTB 3 laps GTB 3 laps GTB 3 laps BTB 3 laps BTB 4 laps BTB 4 laps BTB 4 laps     Low Rows w ppt nv                                       Standing ppt             Ther-Act             Leg Press Step ups w fwd lean for glute drive   [id number] 8" 5J74 8" 2x10 8" 3x10 8" 3x12 8" 3x12 8" w high knee 3x12 8" w high knee                  Goblet squat w ppt nv nv 3x10 15#  3x10 15#  3x10 15#  3x12 15#  3x12 15#  3x12 15#       Pallof Press w ppt                                        Tequila Booty walks w PPT                                                                                                                                                                                             Modalities                                                 *=on hep  [email or web address]@

## 2023-12-18 ENCOUNTER — TELEPHONE (OUTPATIENT)
Dept: PAIN MEDICINE | Facility: CLINIC | Age: 73
End: 2023-12-18

## 2024-01-02 ENCOUNTER — OFFICE VISIT (OUTPATIENT)
Dept: FAMILY MEDICINE CLINIC | Facility: OTHER | Age: 74
End: 2024-01-02
Payer: MEDICARE

## 2024-01-02 ENCOUNTER — OFFICE VISIT (OUTPATIENT)
Dept: PHYSICAL THERAPY | Facility: REHABILITATION | Age: 74
End: 2024-01-02
Payer: MEDICARE

## 2024-01-02 VITALS
WEIGHT: 152 LBS | HEART RATE: 63 BPM | DIASTOLIC BLOOD PRESSURE: 58 MMHG | BODY MASS INDEX: 23.04 KG/M2 | HEIGHT: 68 IN | SYSTOLIC BLOOD PRESSURE: 102 MMHG | RESPIRATION RATE: 16 BRPM | TEMPERATURE: 98.2 F | OXYGEN SATURATION: 95 %

## 2024-01-02 DIAGNOSIS — M54.16 LUMBAR RADICULITIS: Primary | ICD-10-CM

## 2024-01-02 DIAGNOSIS — M54.42 CHRONIC BILATERAL LOW BACK PAIN WITH BILATERAL SCIATICA: ICD-10-CM

## 2024-01-02 DIAGNOSIS — M19.90 ARTHRITIS: ICD-10-CM

## 2024-01-02 DIAGNOSIS — M54.41 CHRONIC BILATERAL LOW BACK PAIN WITH BILATERAL SCIATICA: ICD-10-CM

## 2024-01-02 DIAGNOSIS — M54.41 CHRONIC BILATERAL LOW BACK PAIN WITH BILATERAL SCIATICA: Primary | ICD-10-CM

## 2024-01-02 DIAGNOSIS — Z79.1 NSAID LONG-TERM USE: ICD-10-CM

## 2024-01-02 DIAGNOSIS — G89.29 CHRONIC BILATERAL LOW BACK PAIN WITH BILATERAL SCIATICA: Primary | ICD-10-CM

## 2024-01-02 DIAGNOSIS — G89.29 CHRONIC BILATERAL LOW BACK PAIN WITH BILATERAL SCIATICA: ICD-10-CM

## 2024-01-02 DIAGNOSIS — M54.42 CHRONIC BILATERAL LOW BACK PAIN WITH BILATERAL SCIATICA: Primary | ICD-10-CM

## 2024-01-02 PROCEDURE — 97112 NEUROMUSCULAR REEDUCATION: CPT

## 2024-01-02 PROCEDURE — 97530 THERAPEUTIC ACTIVITIES: CPT

## 2024-01-02 PROCEDURE — 97110 THERAPEUTIC EXERCISES: CPT

## 2024-01-02 PROCEDURE — 99213 OFFICE O/P EST LOW 20 MIN: CPT | Performed by: FAMILY MEDICINE

## 2024-01-02 RX ORDER — PANTOPRAZOLE SODIUM 20 MG/1
20 TABLET, DELAYED RELEASE ORAL
Qty: 30 TABLET | Refills: 5 | Status: SHIPPED | OUTPATIENT
Start: 2024-01-02 | End: 2024-06-30

## 2024-01-02 RX ORDER — GABAPENTIN 300 MG/1
300 CAPSULE ORAL 3 TIMES DAILY
Qty: 90 CAPSULE | Refills: 2 | Status: SHIPPED | OUTPATIENT
Start: 2024-01-02 | End: 2024-04-01

## 2024-01-02 RX ORDER — METHOCARBAMOL 500 MG/1
500 TABLET, FILM COATED ORAL 4 TIMES DAILY
Qty: 28 TABLET | Refills: 0 | Status: SHIPPED | OUTPATIENT
Start: 2024-01-02 | End: 2024-01-09

## 2024-01-02 RX ORDER — METHYLPREDNISOLONE 4 MG/1
TABLET ORAL
Qty: 21 EACH | Refills: 0 | Status: SHIPPED | OUTPATIENT
Start: 2024-01-02

## 2024-01-02 NOTE — PROGRESS NOTES
"Daily Note     Today's date: 2024  Patient name: Shaun Mohr  : 1950  MRN: [id number]  Referring provider: Fan Cortes  Dx:   Encounter Diagnosis     ICD-10-CM    1. Chronic bilateral low back pain with bilateral sciatica  M54.42     M54.41     G89.29                      Subjective: Patient noted no new complaints.       Objective: See treatment diary below      Assessment: Tolerated treatment well. Patient exhibited good technique with therapeutic exercises and would benefit from continued PT. Increased reps for clamshells to 3 x 12 instead of 3 x 10 without patient complaints and correct form.       Plan: Continue per plan of care.      Precautions: HTN      Daily Treatment Diary      Manual 10/31/23  11/2 11/6 11/16 11/21 11/28 12/5 12/12 1/2    TPR R Piriformis nv 8'                        Ther-Ex                    Bike (endurance) nv nv 5' lvl 1  5' lvl 1  5' lvl 1  5' lvl 1  5' lvl 1  5' lvl 1  5' lvl 1     Seated lumbar spine flx hands on ankles*      5x10s 5x10s np 10s x 5     Seated fig 4 stretch* 5x10\" Supine 5x10\"x2   4x15s 4x15 s Supine 5x10s Supine 5x10s Supine 5x 10s    MET ext L, flx r* 10x5\"            Prone quad stretch rocky  3x30\" ea  3x30\" ea 3x30\" ea 4x30\" 4x30\" 4x30s 4x30s w half foam 4 x 30s w/half foam                 Neuro-Re-ed             ADIM* 10x5\"                   ADIM marches* 2x8 dc                  ADIM heel taps  3x8 3x10  3x10  dc             ADIM bicycles     nv    3 x 8    Bridges w march* 3x10 3x10  3x12  3x12  3x12  3x12  3x12  3x12  3 x 12     Clamshells* BTB 3x10 BTB 3x12 BTB 3x12  np  Purple 3x10  Purple 3x10  Purple 3x10  Purple 3x10  purple   3 x 12     Lateral band walks  GTB 3 laps GTB 3 laps GTB 3 laps BTB 3 laps BTB 4 laps BTB 4 laps BTB 4 laps BTB   4 laps    Low Rows w ppt nv                                       Standing ppt             Ther-Act             Leg Press               Step ups w fwd lean for glute drive   2x10 8\" 2x10 8\" " "2x10 8\" 3x10 8\" 3x12 8\" 3x12 8\" w high knee 3x12 8\" w high knee 3x12 8\" w high knee                 Goblet squat w ppt nv nv 3x10 15#  3x10 15#  3x10 15#  3x12 15#  3x12 15#  3x12 15#  3 x 12 15#     Pallof Press w ppt               blue TB 2 x 10                         Thurston walks w PPT                                                                                                                                                                                             Modalities                                                 *=on hep  [email or web address]@                                          "

## 2024-01-02 NOTE — PATIENT INSTRUCTIONS
Would recommend taking Vit B complexes with your NSAID's as there is evidence that taken together can improve pain control.

## 2024-01-02 NOTE — PROGRESS NOTES
Assessment/Plan:    Lumbar radiculitis  - Patient notes acute on chronic exacerbation of back pain occurring 12/18 while walking at a rest stop after an extended period of driving up to [place]  - Patient notes pain 10/10 with onset of radiation and weakness down b/l legs   - Patient notes return of normal sensation and denies any radiation down legs at follow up   - Patient is currently finishing up PT and is scheduling with [facility]     Plan  - Medrol dose pack  - Robaxin; consider switch to Flexeril   - Gabapentin 100 mg TID   - Voltaren 50 mg BID PRN   - Start prophylactic PPI  - F/u [facility]       Diagnoses and all orders for this visit:    Lumbar radiculitis  -     methylPREDNISolone 4 MG tablet therapy pack; Use as directed on package  -     methocarbamol (ROBAXIN) 500 mg tablet; Take 1 tablet (500 mg total) by mouth 4 (four) times a day for 7 days    Chronic bilateral low back pain with bilateral sciatica  -     gabapentin (Neurontin) 300 mg capsule; Take 1 capsule (300 mg total) by mouth 3 (three) times a day    Arthritis  -     diclofenac sodium (VOLTAREN) 50 mg EC tablet; Take 2 tablets (100 mg total) by mouth 2 (two) times a day    NSAID long-term use  -     pantoprazole (PROTONIX) 20 mg tablet; Take 1 tablet (20 mg total) by mouth daily before breakfast          Subjective:      Patient ID: Shaun Mohr is a 73 y.o. male.    73 yoM presenting with CC of acute on chronic back pain that came on 12/18. Patient notes while driving up to [place], came to a rest stop and back pain came on radiating down his legs b/l while out walking. Notes loss of strength in legs b/l as well that lasted for a couple of days. Denies any numbness. Noted change in gait. Pain currently 4-5/10 in severity without radiation. Pain up to 10/10 at its worse. Pain exacerbated with long drives, bending down and sometimes twisting motions. Currently without any red flag symptoms.         The following portions  "of the patient's history were reviewed and updated as appropriate: allergies, current medications, past family history, past medical history, past social history, past surgical history, and problem list.    Review of Systems   Constitutional:  Negative for activity change.   HENT:  Negative for congestion and rhinorrhea.    Eyes:  Negative for visual disturbance.   Respiratory:  Negative for shortness of breath and wheezing.    Cardiovascular:  Negative for chest pain and palpitations.   Gastrointestinal:  Negative for constipation, diarrhea, nausea and vomiting.   Genitourinary:  Negative for decreased urine volume, dysuria and hematuria.   Musculoskeletal:  Positive for back pain and gait problem.   Skin:  Negative for color change and rash.   Neurological:  Positive for weakness. Negative for dizziness, light-headedness and headaches.         Objective:      /58   Pulse 63   Temp 98.2 °F (36.8 °C)   Resp 16   Ht 5' 8\" (1.727 m)   Wt 68.9 kg (152 lb)   SpO2 95%   BMI 23.11 kg/m²          Physical Exam  Constitutional:       General: He is not in acute distress.     Appearance: Normal appearance. He is not ill-appearing.   HENT:      Head: Normocephalic and atraumatic.      Right Ear: External ear normal.      Left Ear: External ear normal.      Nose: Nose normal.   Eyes:      Extraocular Movements: Extraocular movements intact.      Conjunctiva/sclera: Conjunctivae normal.   Cardiovascular:      Rate and Rhythm: Normal rate and regular rhythm.      Pulses: Normal pulses.      Heart sounds: Normal heart sounds.   Pulmonary:      Effort: Pulmonary effort is normal.      Breath sounds: Normal breath sounds. No wheezing, rhonchi or rales.   Abdominal:      General: Abdomen is flat. There is no distension.      Palpations: Abdomen is soft. There is no mass.      Tenderness: There is no abdominal tenderness. There is no guarding.   Musculoskeletal:      Right lower leg: No edema.      Left lower leg: No " edema.   Skin:     General: Skin is warm and dry.   Neurological:      Mental Status: He is alert.      Sensory: No sensory deficit.      Motor: No weakness.      Gait: Gait abnormal.      Deep Tendon Reflexes: Reflexes normal.

## 2024-01-03 ENCOUNTER — APPOINTMENT (OUTPATIENT)
Dept: PHYSICAL THERAPY | Facility: REHABILITATION | Age: 74
End: 2024-01-03
Payer: MEDICARE

## 2024-01-08 NOTE — ASSESSMENT & PLAN NOTE
- Patient notes acute on chronic exacerbation of back pain occurring 12/18 while walking at a rest stop after an extended period of driving up to [place]  - Patient notes pain 10/10 with onset of radiation and weakness down b/l legs   - Patient notes return of normal sensation and denies any radiation down legs at follow up   - Patient is currently finishing up PT and is scheduling with [facility] management     Plan  - Medrol dose pack  - Robaxin; consider switch to Flexeril   - Gabapentin 100 mg TID   - Voltaren 50 mg BID PRN   - Start prophylactic PPI  - F/u [facility]

## 2024-01-09 ENCOUNTER — OFFICE VISIT (OUTPATIENT)
Dept: PHYSICAL THERAPY | Facility: REHABILITATION | Age: 74
End: 2024-01-09
Payer: MEDICARE

## 2024-01-09 DIAGNOSIS — M54.41 CHRONIC BILATERAL LOW BACK PAIN WITH BILATERAL SCIATICA: Primary | ICD-10-CM

## 2024-01-09 DIAGNOSIS — G89.29 CHRONIC BILATERAL LOW BACK PAIN WITH BILATERAL SCIATICA: Primary | ICD-10-CM

## 2024-01-09 DIAGNOSIS — M54.42 CHRONIC BILATERAL LOW BACK PAIN WITH BILATERAL SCIATICA: Primary | ICD-10-CM

## 2024-01-09 PROCEDURE — 97110 THERAPEUTIC EXERCISES: CPT | Performed by: PHYSICAL THERAPIST

## 2024-01-09 PROCEDURE — 97112 NEUROMUSCULAR REEDUCATION: CPT | Performed by: PHYSICAL THERAPIST

## 2024-01-09 PROCEDURE — 97530 THERAPEUTIC ACTIVITIES: CPT | Performed by: PHYSICAL THERAPIST

## 2024-01-09 NOTE — PROGRESS NOTES
"Daily Note     Today's date: 2024  Patient name: Shaun Mohr  : 1950  MRN: [id number]  Referring provider: Fan Cortes*  Dx:   Encounter Diagnosis     ICD-10-CM    1. Chronic bilateral low back pain with bilateral sciatica  M54.42     M54.41     G89.29           Start Time: 0915  Stop Time: 1000  Total time in clinic (min): 45 minutes    Subjective: Shaun reports that his back continues to be about the same, injection was rescheduled       Objective: See treatment diary below      Assessment: Tolerated treatment well. Patient demonstrated fatigue post treatment, exhibited good technique with therapeutic exercises, and would benefit from continued PT.       Plan: Continue per plan of care.         Precautions: HTN      Daily Treatment Diary      Manual    TPR R Piriformis           Cross body TFL stretch        done              Ther-Ex                  Bike (endurance) 5' lvl 1  5' lvl 1  5' lvl 1  5' lvl 1  5' lvl 1  5' lvl 1  5' lvl 1  5' lvl 1   Seated lumbar spine flx hands on ankles*    5x10s 5x10s np 10s x 5  nv   Seated fig 4 stretch*   4x15s 4x15 s Supine 5x10s Supine 5x10s Supine 5x 10s    MET ext L, flx r*           Prone quad stretch rocky 3x30\" ea 3x30\" ea 4x30\" 4x30\" 4x30s 4x30s w half foam 4 x 30s w/half foam 4x30s   TFL stretch w strap        5x10s              Neuro-Re-ed           ADIM*                  ADIM marches*                  ADIM heel taps 3x10  3x10  dc             ADIM bicycles   nv    3 x 8 3x10 slow   Bridges w march* 3x12  3x12  3x12  3x12  3x12  3x12  3 x 12  3x15   Clamshells* BTB 3x12  np  Purple 3x10  Purple 3x10  Purple 3x10  Purple 3x10  purple   3 x 12  purple   3 x 15   Lateral band walks GTB 3 laps GTB 3 laps BTB 3 laps BTB 4 laps BTB 4 laps BTB 4 laps BTB   4 laps Purple 3 laps   Low Rows w ppt                                    Standing ppt           Ther-Act           Leg Press             Step ups w fwd " "lean for glute drive  2x10 8\" 2x10 8\" 3x10 8\" 3x12 8\" 3x12 8\" w high knee 3x12 8\" w high knee 3x12 8\" w high knee 8\" 3x15              Goblet squat w ppt 3x10 15#  3x10 15#  3x10 15#  3x12 15#  3x12 15#  3x12 15#  3 x 12 15#  3x15 15#   Pallof Press w ppt             blue TB 2 x 10 nv                      Thurston walks w PPT                                                                                                                                                               Modalities                                         *=on hep  [email or web address]@                                               "

## 2024-01-16 ENCOUNTER — OFFICE VISIT (OUTPATIENT)
Dept: PHYSICAL THERAPY | Facility: REHABILITATION | Age: 74
End: 2024-01-16
Payer: MEDICARE

## 2024-01-16 DIAGNOSIS — M54.42 CHRONIC BILATERAL LOW BACK PAIN WITH BILATERAL SCIATICA: Primary | ICD-10-CM

## 2024-01-16 DIAGNOSIS — M54.41 CHRONIC BILATERAL LOW BACK PAIN WITH BILATERAL SCIATICA: Primary | ICD-10-CM

## 2024-01-16 DIAGNOSIS — G89.29 CHRONIC BILATERAL LOW BACK PAIN WITH BILATERAL SCIATICA: Primary | ICD-10-CM

## 2024-01-16 PROCEDURE — 97110 THERAPEUTIC EXERCISES: CPT | Performed by: PHYSICAL THERAPIST

## 2024-01-16 PROCEDURE — 97112 NEUROMUSCULAR REEDUCATION: CPT | Performed by: PHYSICAL THERAPIST

## 2024-01-16 PROCEDURE — 97530 THERAPEUTIC ACTIVITIES: CPT | Performed by: PHYSICAL THERAPIST

## 2024-01-16 PROCEDURE — 97140 MANUAL THERAPY 1/> REGIONS: CPT | Performed by: PHYSICAL THERAPIST

## 2024-01-16 NOTE — PROGRESS NOTES
"Daily Note     Today's date: 2024  Patient name: Shaun Mohr  : 1950  MRN: [id number]  Referring provider: Fan Cortes*  Dx:   Encounter Diagnosis     ICD-10-CM    1. Chronic bilateral low back pain with bilateral sciatica  M54.42     M54.41     G89.29                      Subjective: Shaun reports that his back continues to be sore at the end of the night, notes taking a new medication as prescribed by PCP which has helped slightly.       Objective: See treatment diary below      Assessment: Tolerated treatment well. Patient demonstrated fatigue post treatment, exhibited good technique with therapeutic exercises, and would benefit from continued PT.  Clicking during bicycles, reduced via hip IR.       Plan: Continue per plan of care.         Precautions: HTN      Daily Treatment Diary      Manual    TPR R Piriformis            Cross body TFL stretch        done                Ther-Ex                   Bike (endurance) 5' lvl 1  5' lvl 1  5' lvl 1  5' lvl 1  5' lvl 1  5' lvl 1  5' lvl 1  5' lvl 1 5' lvl 1   Seated lumbar spine flx hands on ankles*    5x10s 5x10s np 10s x 5  nv    Seated fig 4 stretch*   4x15s 4x15 s Supine 5x10s Supine 5x10s Supine 5x 10s  Supine 10x5s   MET ext L, flx r*            Prone quad stretch rocky 3x30\" ea 3x30\" ea 4x30\" 4x30\" 4x30s 4x30s w half foam 4 x 30s w/half foam 4x30s 4x30s   TFL stretch w strap        5x10s 5x10s               Neuro-Re-ed            ADIM*                   ADIM marches*                   ADIM heel taps 3x10  3x10  dc              ADIM bicycles   nv    3 x 8 3x10 slow 3x10   Bridges w march* 3x12  3x12  3x12  3x12  3x12  3x12  3 x 12  3x15 3x15   Clamshells* BTB 3x12  np  Purple 3x10  Purple 3x10  Purple 3x10  Purple 3x10  purple   3 x 12  purple   3 x 15 Purple 3x15   Lateral band walks GTB 3 laps GTB 3 laps BTB 3 laps BTB 4 laps BTB 4 laps BTB 4 laps BTB   4 laps Purple 3 laps Purple 3 " "laps   Low Rows w ppt                                      Standing ppt            Ther-Act            Leg Press              Step ups w fwd lean for glute drive  2x10 8\" 2x10 8\" 3x10 8\" 3x12 8\" 3x12 8\" w high knee 3x12 8\" w high knee 3x12 8\" w high knee 8\" 3x15                Goblet squat w ppt 3x10 15#  3x10 15#  3x10 15#  3x12 15#  3x12 15#  3x12 15#  3 x 12 15#  3x15 15# 3x12 20#   Pallof Press w ppt             blue TB 2 x 10 nv  BTB 2x10                      Thurston walks w PPT                                                                                                                                                                       Modalities                                           *=on hep  [email or web address]@                                                   "

## 2024-02-01 ENCOUNTER — OFFICE VISIT (OUTPATIENT)
Dept: PHYSICAL THERAPY | Facility: REHABILITATION | Age: 74
End: 2024-02-01
Payer: MEDICARE

## 2024-02-01 DIAGNOSIS — M54.42 CHRONIC BILATERAL LOW BACK PAIN WITH BILATERAL SCIATICA: Primary | ICD-10-CM

## 2024-02-01 DIAGNOSIS — G89.29 CHRONIC BILATERAL LOW BACK PAIN WITH BILATERAL SCIATICA: Primary | ICD-10-CM

## 2024-02-01 DIAGNOSIS — M54.41 CHRONIC BILATERAL LOW BACK PAIN WITH BILATERAL SCIATICA: Primary | ICD-10-CM

## 2024-02-01 PROCEDURE — 97112 NEUROMUSCULAR REEDUCATION: CPT

## 2024-02-01 PROCEDURE — 97140 MANUAL THERAPY 1/> REGIONS: CPT

## 2024-02-01 PROCEDURE — 97110 THERAPEUTIC EXERCISES: CPT

## 2024-02-01 NOTE — PROGRESS NOTES
"Daily Note     Today's date: 2024  Patient name: Shaun Mohr  : 1950  MRN: [id number]  Referring provider: Fan Cortes  Dx:   Encounter Diagnosis     ICD-10-CM    1. Chronic bilateral low back pain with bilateral sciatica  M54.42     M54.41     G89.29                      Subjective: Pt reports he is doing well, the medication he has been on has been helping decrease the pain intensity in his back. The pain has been at a 8/10 at worst as opposed to it being 10/10 at worst prior to medication.       Objective: See treatment diary below      Assessment: Tolerated treatment well. Pt performed all exercises without issue, continue to progress to tolerance. Pt showed good form with exercises, would benefit from continued focus on stretching and stability/strengthening exercises. Patient demonstrated fatigue post treatment, exhibited good technique with therapeutic exercises, and would benefit from continued PT      Plan: Continue per plan of care.      Precautions: HTN      Daily Treatment Diary      Manual    TPR R Piriformis             Cross body TFL stretch        done                  Ther-Ex                    Bike (endurance) 5' lvl 1  5' lvl 1  5' lvl 1  5' lvl 1  5' lvl 1  5' lvl 1  5' lvl 1  5' lvl 1 5' lvl 1 5' lvl 1   Seated lumbar spine flx hands on ankles*    5x10s 5x10s np 10s x 5  nv     Seated fig 4 stretch*   4x15s 4x15 s Supine 5x10s Supine 5x10s Supine 5x 10s  Supine 10x5s Supine 10x5s   MET ext L, flx r*             Prone quad stretch rocky 3x30\" ea 3x30\" ea 4x30\" 4x30\" 4x30s 4x30s w half foam 4 x 30s w/half foam 4x30s 4x30s 4x30\"   TFL stretch w strap        5x10s 5x10s 5x10s                Neuro-Re-ed             ADIM*                    ADIM marches*                    ADIM heel taps 3x10  3x10  dc               ADIM bicycles   nv    3 x 8 3x10 slow 3x10 3x10   Bridges w march* 3x12  3x12  3x12  3x12  3x12  3x12  3 x 12  " "3x15 3x15 3x15   Clamshells* BTB 3x12  np  Purple 3x10  Purple 3x10  Purple 3x10  Purple 3x10  purple   3 x 12  purple   3 x 15 Purple 3x15 Purple 3x15   Lateral band walks GTB 3 laps GTB 3 laps BTB 3 laps BTB 4 laps BTB 4 laps BTB 4 laps BTB   4 laps Purple 3 laps Purple 3 laps Purple 3 laps   Low Rows w ppt                                        Standing ppt             Ther-Act             Leg Press               Step ups w fwd lean for glute drive  2x10 8\" 2x10 8\" 3x10 8\" 3x12 8\" 3x12 8\" w high knee 3x12 8\" w high knee 3x12 8\" w high knee 8\" 3x15  8\" 3x15                Goblet squat w ppt 3x10 15#  3x10 15#  3x10 15#  3x12 15#  3x12 15#  3x12 15#  3 x 12 15#  3x15 15# 3x12 20# 3x12 20#   Pallof Press w ppt             blue TB 2 x 10 nv  BTB 2x10 BTB 2x10 ea                       Thurston walks w PPT                                                                                                                                                                               Modalities                                             *=on hep  [email or web address]@                                                    "

## 2024-02-05 NOTE — PROGRESS NOTES
Assessment  1. Intervertebral disc disorder with radiculopathy of lumbar region    2. Lumbar stenosis with neurogenic claudication    3. Lumbar radiculitis        Plan  The patient's symptoms, history/physical are consistent with pain as a result of his multilevel lumbar spinal stenosis.  At this time, I am going to order an updated MRI of the lumbar spine to evaluate for the worsening symptoms he has been experiencing and then we will discuss treatment moving forward which may include referral for neurosurgical evaluation for possible microdiscectomy versus an epidural steroid injection.    For now, I will not make any changes to his current medication regimen.    My impressions and treatment recommendations were discussed in detail with the patient who verbalized understanding and had no further questions.  Discharge instructions were provided. I personally saw and examined the patient and I agree with the above discussed plan of care.    Orders Placed This Encounter   Procedures   • MRI lumbar spine without contrast     Standing Status:   Future     Standing Expiration Date:   2/9/2028     Scheduling Instructions:      There is no preparation for this test. Please leave your jewelry and valuables at home, wedding rings are the exception. All patients will be required to change into a hospital gown and pants.  Street clothes are not permitted in the MRI.  Magnetic nail polish must be removed prior to arrival for your test. Please bring your insurance cards, a form of photo ID and a list of your medications with you. Arrive 15 minutes prior to your appointment time in order to register. Please bring any prior CT or MRI studies of this area that were not performed at a [facility].            To schedule this appointment, please contact Central Scheduling at [phone].            Prior to your appointment, please make sure you complete the MRI Screening Form when you e-Check in for your appointment.  This will be available starting 7 days before your appointment in [place]. You may receive an e-mail with an activation code if you do not have a Pinterest account. If you do not have access to a device, we will complete your screening at your appointment.     Order Specific Question:   What is the patient's sedation requirement? If Medication for Claustrophobia is selected, order medication at this point.     Answer:   No Sedation     Order Specific Question:   Does this procedure require the 3T MRI at [place] or [place]?     Answer:   No     Order Specific Question:   Release to patient through [place]     Answer:   Immediate     Order Specific Question:   Is order priority selected as STAT?     Answer:   No     Order Specific Question:   Reason for Exam (FREE TEXT)     Answer:   worseing lbp into legs worse on the left     No orders of the defined types were placed in this encounter.      History of Present Illness    Shaun Mohr is a 73 y.o. male referred by Dr. Cortes for back and leg pain which has been present for more than 2 years.  Symptoms are moderate to severe rated 5-10/10 on a numeric rating scale and felt intermittently worse at night described to be shooting with numbness and paresthesias radiating into the anterior thighs.  Symptoms are aggravated with walking.  Treatment history has included prior injections which were not helpful.  Physical therapy has been moderately helpful.  He is on diclofenac and gabapentin which are helpful.    I have personally reviewed and/or updated the patient's past medical history, past surgical history, family history, social history, current medications, allergies, and vital signs today.     Review of Systems   Constitutional:  Negative for fever and unexpected weight change.   HENT:  Negative for trouble swallowing.    Eyes:  Negative for visual disturbance.   Respiratory:  Negative for shortness of breath and wheezing.    Cardiovascular:  Negative for chest pain and  palpitations.   Gastrointestinal:  Negative for constipation, diarrhea, nausea and vomiting.   Endocrine: Negative for cold intolerance, heat intolerance and polydipsia.   Genitourinary:  Negative for difficulty urinating and frequency.   Musculoskeletal:  Negative for arthralgias, gait problem, joint swelling and myalgias.   Skin:  Negative for rash.   Neurological:  Negative for dizziness, seizures, syncope, weakness and headaches.   Hematological:  Does not bruise/bleed easily.   Psychiatric/Behavioral:  Negative for dysphoric mood.    All other systems reviewed and are negative.      Patient Active Problem List   Diagnosis   • Hyperlipidemia   • Prediabetes   • Left leg pain   • Asbestos exposure   • Allergic rhinitis   • Conductive hearing loss   • Benign essential hypertension   • Dysfunction of eustachian tube   • History of smoking   • Pulmonary nodules   • Lumbar disc herniation with radiculopathy   • Spondylolisthesis of lumbar region   • Piriformis syndrome of left side   • Nocturia   • Lumbar radiculitis   • Chronic bilateral low back pain with bilateral sciatica       Past Medical History:   Diagnosis Date   • Hernia, inguinal, right 2/24/2021   • Hypertension    • Irregular heart beat    • Lung nodule        Past Surgical History:   Procedure Laterality Date   • BUNIONECTOMY Right    • COLONOSCOPY  2018   • HAND SURGERY Right     cyst excision   • MD RPR 1ST INGUN HRNA AGE 6 YRS/> REDUCIBLE Right 03/15/2021    Procedure: INGUINAL HERNIA REPAIR;  Surgeon: Shaun Pineda DO;  Location: [facility];  Service: General   • SHOULDER SURGERY Right 2018       Family History   Problem Relation Age of Onset   • Diabetes Mother    • Heart attack Father        Social History     Occupational History   • Not on file   Tobacco Use   • Smoking status: Former     Current packs/day: 0.00     Average packs/day: 1 pack/day for 10.0 years (10.0 ttl pk-yrs)     Types: Cigarettes     Start date: 1969     Quit date: 1979  "    Years since quittin.1   • Smokeless tobacco: Never   Vaping Use   • Vaping status: Never Used   Substance and Sexual Activity   • Alcohol use: Yes     Alcohol/week: 1.0 standard drink of alcohol     Types: 1 Glasses of wine per week     Comment: a glass of wine or two a night    • Drug use: Never   • Sexual activity: Yes     Partners: Female       Current Outpatient Medications on File Prior to Visit   Medication Sig   • amLODIPine (NORVASC) 2.5 mg tablet TAKE 1 TABLET DAILY   • Ascorbic Acid (vitamin C) 1000 MG tablet Take 1,000 mg by mouth daily   • aspirin 81 mg chewable tablet Chew 81 mg daily at bedtime    • Cholecalciferol (Vitamin D-3) 25 MCG (1000 UT) CAPS Take 1 capsule by mouth   • Diclofenac Sodium (VOLTAREN) 1 % APPLY 2 GRAMS EXTERNALLY TO THE AFFECTED AREA FOUR TIMES DAILY   • diclofenac sodium (VOLTAREN) 50 mg EC tablet Take 2 tablets (100 mg total) by mouth 2 (two) times a day   • finasteride (PROSCAR) 5 mg tablet TAKE 1/2 TABLET(2.5 MG) BY MOUTH DAILY AT BEDTIME   • gabapentin (Neurontin) 300 mg capsule Take 1 capsule (300 mg total) by mouth 3 (three) times a day   • methylPREDNISolone 4 MG tablet therapy pack Use as directed on package   • Multiple Vitamin (MULTIVITAMIN ADULT PO) Take 1 tablet by mouth daily   • pantoprazole (PROTONIX) 20 mg tablet Take 1 tablet (20 mg total) by mouth daily before breakfast   • rosuvastatin (CRESTOR) 20 MG tablet Take 1 tablet (20 mg total) by mouth daily   • Turmeric 400 MG CAPS Take 1 capsule by mouth daily   • methocarbamol (ROBAXIN) 500 mg tablet Take 1 tablet (500 mg total) by mouth 4 (four) times a day for 7 days     No current facility-administered medications on file prior to visit.       No Known Allergies    Physical Exam    /91   Pulse 69   Resp 16   Ht 5' 8\" (1.727 m)   Wt 69.9 kg (154 lb)   BMI 23.42 kg/m²     Constitutional: normal, well developed, well nourished, alert, in no distress and non-toxic and no overt pain " behavior.  Eyes: anicteric  HEENT: grossly intact  Neck: supple, symmetric, trachea midline and no masses   Pulmonary:even and unlabored  Cardiovascular:No edema or pitting edema present  Skin:Normal without rashes or lesions and well hydrated  Psychiatric:Mood and affect appropriate  Neurologic:Cranial Nerves II-XII grossly intact  Musculoskeletal:normal    Lumbar Spine Exam  Appearance:  Normal lordosis  Palpation/Tenderness:  no tenderness or spasm  Range of Motion:  Flexion:  Minimally limited  without pain  Extension:  Moderately limited  with pain  Motor Strength:  Left hip flexion:  5/5  Left hip extension:  5/5  Right hip flexion:  5/5  Right hip extension:  5/5  Left knee flexion:  5/5  Left knee extension:  5/5  Right knee flexion:  5/5  Right knee extension:  5/5  Left foot dorsiflexion:  5/5  Left foot plantar flexion:  5/5  Right foot dorsiflexion:  5/5  Right foot plantar flexion:  5/5  Special Tests:  Left Ryan's Maneuver:  negative  Right Ryan's Maneuver:  negative    Imaging    MRI of the lumbar spine without contrast (6/14/2021)    HISTORY: Back pain or radiculopathy, > 6 wks; Lumbar radiculopathy; lower back   pain and left-sided pain and numbness for one year     TECHNIQUE: Multiplanar and multisequence MR images of the lumbar spine were   obtained at [address] without intravenous contrast.     CONTRAST: None     COMPARISON: None     FINDINGS:   Minimal to mild heterogeneity of marrow signal noted throughout the lumbar spine   on the T1-weighted images. Diffuse dropout of the background signal seen on the   opposed phase images. Scattered rounded areas of high signal intensity are   suggestive of small islands of fat including within the superior aspect of T12   and within L4/L3. Small areas of endplate marrow edema signal at the inferior   endplate of L4 and superior endplate of L5 more pronounced on the right, favored   to represent fibrovascular type endplate degenerative changes.  Minimal anterior   disc osteophytes at multiple levels with subtle endplate marrow edema including   adjacent to the level of L2-L3, L3-L4 and L4-L5. No focal marrow edema or   cortical deformity of the visualized posterior elements.     Conus is located at the level of L1. No appreciable mass effect on the distal   cord/conus. Narrowing of the thecal sac is most pronounced at the level of   L3-L4.     Incidentally noted are areas of effacement between the spinous processes,   suggestive of fibrotic changes in the adjacent soft tissues. Paraspinal soft   tissues are otherwise unremarkable.     T11-12: Spinal canal is patent. Neural foramina are patent bilaterally.   T12-L1: Spinal canal is patent. Neural foramina are patent bilaterally.   L1-L2: Thecal sac is patent. Neural foramina are patent and laterally.   L2-L3: Small broad-based disc protrusion/extrusion eccentric towards the right   neural foramen with mild superior migration measuring 7 mm. Tiny annular fissure   on the right. Mild thickening of ligamentum flavum. Thecal sac is patent. Neural   foramina are patent bilaterally.   L3-L4: 4 to 5 mm of anterolisthesis of L3 on L4 more pronounced on the left.   Moderate size extruded disc fragment acentric towards the left extraforaminal   region measuring at least 1.1 cm anteroposterior x 1.0 cm craniocaudal   dimension, with a broad-based area of low signal intensity measuring at least   1.3 cm mediolateral dimension (series 4 image 4 and series 6 image 2),   inseparable from and likely impinging on/compressing the exiting left L3 nerve   root in the extraforaminal region. Superimposed small moderate broad-based disc   protrusion/extrusion eccentric towards the left with superimposed annular   fissure. Moderate to severe facet arthropathy with partial subluxation of the   facet joints, and moderate to marked thickening of ligamentum flavum more   pronounced on the left (series 6 image 3/4). These factors  contribute to   moderate to moderate/severe narrowing of the thecal sac in the bilateral   paracentral regions more pronounced on the left, and there is abutment with   possible impingement/compression of the traversing right and left L4 nerve roots   as well as the posterior left-sided nerve roots in the thecal sac. Moderate   narrowing of the right neural foramen. Moderate to severe narrowing of left   neural foramen.   L4-L5: Moderate to large broad-based disc protrusion/extrusion, including a   superiorly migrating component on the left neural foramen (series 4 image 4/5),   measuring 10 mm craniocaudal dimension, with a component also extending into the   right neural foramen measuring 9 mm craniocaudal dimension. High signal   intensity suggestive of annular fissure. Mild to moderate facet arthropathy and   moderate to marked thickening of ligamentum flavum. Moderate narrowing of the   thecal sac. Protrusion abuts and likely impinges on/compresses the traversing   left L5 nerve root and abuts and possibly impinges on the traversing right L5   nerve root in the thecal sac. Moderate/severe to severe narrowing of the neural   foramina bilaterally, with abutment and probable impingement/compression of the   exiting right and left L4 nerve roots.   L5-S1: 2 to 3 mm of anterolisthesis of L5 on S1. Small to moderate disc   protrusion/extrusion eccentric towards the right neural foramen with mild to   moderate superior migration measuring 8 mm and slight superior migration on the   left. Moderate to severe facet arthropathy and moderate to marked thickening of   the ligamentum flavum. Disc approximates the traversing right and left S1 nerve   roots in the thecal sac with equivocal abutment. Thecal sac is patent. Moderate   to severe narrowing of the right neural foramen with impingement/compression of   the exiting right L5 nerve root. Mild narrowing of left neural foramen with   equivocal abutment of the exiting left  L5 nerve root.     Please note that the above numbering system assumes 5 lumbar type vertebral   bodies.  Accurate numbering of the vertebral bodies would require imaging of the   entire thoracolumbar spine.                       IMPRESSION:     1. Multilevel degenerative disc disease, with neural foraminal narrowing most   present at L3-L4 and L4-L5 on the left, and narrowing of the thecal sac most   pronounced at L3-L4. Findings as follows:     L3-L4: Moderate size extruded disc fragment acentric towards the left   extraforaminal region measuring at least 1.1 x 1.0 x1.3 cm (series 4 image 4 and   series 6 image 2), inseparable from and likely impinging on/compressing the   exiting left L3 nerve root in the extraforaminal region. Superimposed small   moderate broad-based disc protrusion/extrusion eccentric towards the left with   superimposed annular fissure. These factors contribute to moderate to   moderate/severe narrowing of the thecal sac in the bilateral paracentral regions   more pronounced on the left, and there is abutment with possible   impingement/compression of the traversing right and left L4 nerve roots as well   as the posterior left-sided nerve roots in the thecal sac. Moderate narrowing of   the right neural foramen. Moderate to severe narrowing of left neural foramen.     L4-L5: Moderate to large broad-based disc protrusion/extrusion, including a   superiorly migrating component on the left neural foramen (series 4 image 4/5),   measuring 10 mm craniocaudal dimension, with a component also extending into the   right neural foramen measuring 9 mm craniocaudal dimension. High signal   intensity suggestive of annular fissure. Moderate narrowing of the thecal sac.   Protrusion abuts and likely impinges on/compresses the traversing left L5 nerve   root and abuts and possibly impinges on the traversing right L5 nerve root in   the thecal sac. Moderate/severe to severe narrowing of the neural foramina    bilaterally, with abutment and probable impingement/compression of the exiting   right and left L4 nerve roots.     L5-S1: Small to moderate disc protrusion/extrusion eccentric towards the right   neural foramen with mild to moderate superior migration measuring 8 mm and   slight superior migration on the left. Disc approximates the traversing right   and left S1 nerve roots in the thecal sac with equivocal abutment. Moderate to   severe narrowing of the right neural foramen with impingement/compression of the   exiting right L5 nerve root. Mild narrowing of left neural foramen with   equivocal abutment of the exiting left L5 nerve root.     2. Moderate to severe facet arthropathy most pronounced at L3-L4 and L5-S1, with   partial subluxation of the facet joints at L3-L4 (series 6 image 3/4).

## 2024-02-07 ENCOUNTER — OFFICE VISIT (OUTPATIENT)
Dept: PHYSICAL THERAPY | Facility: REHABILITATION | Age: 74
End: 2024-02-07
Payer: MEDICARE

## 2024-02-07 DIAGNOSIS — M54.42 CHRONIC BILATERAL LOW BACK PAIN WITH BILATERAL SCIATICA: Primary | ICD-10-CM

## 2024-02-07 DIAGNOSIS — G89.29 CHRONIC BILATERAL LOW BACK PAIN WITH BILATERAL SCIATICA: Primary | ICD-10-CM

## 2024-02-07 DIAGNOSIS — M54.41 CHRONIC BILATERAL LOW BACK PAIN WITH BILATERAL SCIATICA: Primary | ICD-10-CM

## 2024-02-07 PROCEDURE — 97110 THERAPEUTIC EXERCISES: CPT

## 2024-02-07 PROCEDURE — 97140 MANUAL THERAPY 1/> REGIONS: CPT | Performed by: PHYSICAL THERAPIST

## 2024-02-07 PROCEDURE — 97112 NEUROMUSCULAR REEDUCATION: CPT

## 2024-02-07 NOTE — PROGRESS NOTES
"Daily Note     Today's date: 2024  Patient name: Shaun Mohr  : 1950  MRN: [id number]  Referring provider: Fan Cortes*  Dx:   Encounter Diagnosis     ICD-10-CM    1. Chronic bilateral low back pain with bilateral sciatica  M54.42     M54.41     G89.29             Start Time: 1000  Stop Time: 1045  Total time in clinic (min): 45 minutes    Subjective: Pt reports he is doing ok. Still has pain at night. Pain 3-4/10 during the day that progresses. States he has an evaluation with pain management on Friday to assess pain.  Patient experiences radiating pain down anterior thigh when he walks; he will stop and rub it, which seems to help relieve symptoms temporarily allowing him to resume walking again for a bit.       Objective: See treatment diary below      Assessment: Tolerated treatment well with focus on manuals, stretching and stability/strengthening. Increases tightness noted during manuals  L>R to erectus spinal and gluteal/ piriformis regions performed by PT. Added SL leg press, Zone 1 to strengthen hs/glutes with good response. Patient benefits from cues to decrease tempo during TE's to improve muscular recruitment. Patient was fatigued post treatment and would benefit from skilled PT.    Plan: Continue per plan of care.      Precautions: HTN      Daily Treatment Diary      Manual    TPR R Piriformis           Done  PT   Cross body TFL stretch        done   Done  PT   LSP joint mobs           PT   Ther-Ex                     Bike (endurance) 5' lvl 1  5' lvl 1  5' lvl 1  5' lvl 1  5' lvl 1  5' lvl 1  5' lvl 1  5' lvl 1 5' lvl 1 5' lvl 1 5' lvl 1   Seated lumbar spine flx hands on ankles*    5x10s 5x10s np 10s x 5  nv      Seated fig 4 stretch*   4x15s 4x15 s Supine 5x10s Supine 5x10s Supine 5x 10s  Supine 10x5s Supine 10x5s Supine 10x5s   MET ext L, flx r*              Prone quad stretch rocky 3x30\" ea 3x30\" ea 4x30\" 4x30\" 4x30s " "4x30s w half foam 4 x 30s w/half foam 4x30s 4x30s 4x30\"    TFL stretch w strap        5x10s 5x10s 5x10s                  Neuro-Re-ed              ADIM*                     ADIM marches*                     ADIM heel taps 3x10  3x10  dc                ADIM bicycles   nv    3 x 8 3x10 slow 3x10 3x10 3x10   Bridges w march* 3x12  3x12  3x12  3x12  3x12  3x12  3 x 12  3x15 3x15 3x15 3x15   Clamshells* BTB 3x12  np  Purple 3x10  Purple 3x10  Purple 3x10  Purple 3x10  purple   3 x 12  purple   3 x 15 Purple 3x15 Purple 3x15 Purple 3x15   Lateral band walks GTB 3 laps GTB 3 laps BTB 3 laps BTB 4 laps BTB 4 laps BTB 4 laps BTB   4 laps Purple 3 laps Purple 3 laps Purple 3 laps Purple 4 laps   Low Rows w ppt                                          Standing ppt              Ther-Act              Leg Press    SL Press             SL    Zone 1  65#   3x10   Step ups w fwd lean for glute drive  2x10 8\" 2x10 8\" 3x10 8\" 3x12 8\" 3x12 8\" w high knee 3x12 8\" w high knee 3x12 8\" w high knee 8\" 3x15  8\" 3x15 8\" 3x15                 Goblet squat w ppt 3x10 15#  3x10 15#  3x10 15#  3x12 15#  3x12 15#  3x12 15#  3 x 12 15#  3x15 15# 3x12 20# 3x12 20# 3x12 20#   Pallof Press w ppt             blue TB 2 x 10 nv  BTB 2x10 BTB 2x10 ea BTB 2x10 ea                        [place] walks w PPT                                                                                                                                                                                       Modalities                                               *=on hep  PTOTTXEND@                                                    "

## 2024-02-09 ENCOUNTER — CONSULT (OUTPATIENT)
Dept: PAIN MEDICINE | Facility: CLINIC | Age: 74
End: 2024-02-09
Payer: MEDICARE

## 2024-02-09 ENCOUNTER — TRANSCRIBE ORDERS (OUTPATIENT)
Dept: PAIN MEDICINE | Facility: CLINIC | Age: 74
End: 2024-02-09

## 2024-02-09 VITALS
HEART RATE: 69 BPM | DIASTOLIC BLOOD PRESSURE: 91 MMHG | BODY MASS INDEX: 23.34 KG/M2 | SYSTOLIC BLOOD PRESSURE: 148 MMHG | RESPIRATION RATE: 16 BRPM | HEIGHT: 68 IN | WEIGHT: 154 LBS

## 2024-02-09 DIAGNOSIS — M48.062 LUMBAR STENOSIS WITH NEUROGENIC CLAUDICATION: ICD-10-CM

## 2024-02-09 DIAGNOSIS — M51.16 INTERVERTEBRAL DISC DISORDER WITH RADICULOPATHY OF LUMBAR REGION: Primary | ICD-10-CM

## 2024-02-09 DIAGNOSIS — M54.16 LUMBAR RADICULITIS: ICD-10-CM

## 2024-02-09 PROCEDURE — 99204 OFFICE O/P NEW MOD 45 MIN: CPT | Performed by: ANESTHESIOLOGY

## 2024-02-12 ENCOUNTER — TELEPHONE (OUTPATIENT)
Dept: PULMONOLOGY | Facility: CLINIC | Age: 74
End: 2024-02-12

## 2024-02-15 ENCOUNTER — HOSPITAL ENCOUNTER (OUTPATIENT)
Dept: RADIOLOGY | Facility: IMAGING CENTER | Age: 74
End: 2024-02-15
Payer: MEDICARE

## 2024-02-15 ENCOUNTER — OFFICE VISIT (OUTPATIENT)
Dept: PHYSICAL THERAPY | Facility: REHABILITATION | Age: 74
End: 2024-02-15
Payer: MEDICARE

## 2024-02-15 DIAGNOSIS — M51.16 INTERVERTEBRAL DISC DISORDER WITH RADICULOPATHY OF LUMBAR REGION: ICD-10-CM

## 2024-02-15 DIAGNOSIS — M54.42 CHRONIC BILATERAL LOW BACK PAIN WITH BILATERAL SCIATICA: Primary | ICD-10-CM

## 2024-02-15 DIAGNOSIS — G89.29 CHRONIC BILATERAL LOW BACK PAIN WITH BILATERAL SCIATICA: Primary | ICD-10-CM

## 2024-02-15 DIAGNOSIS — M48.062 LUMBAR STENOSIS WITH NEUROGENIC CLAUDICATION: ICD-10-CM

## 2024-02-15 DIAGNOSIS — M54.41 CHRONIC BILATERAL LOW BACK PAIN WITH BILATERAL SCIATICA: Primary | ICD-10-CM

## 2024-02-15 PROCEDURE — 97530 THERAPEUTIC ACTIVITIES: CPT | Performed by: PHYSICAL THERAPIST

## 2024-02-15 PROCEDURE — G1004 CDSM NDSC: HCPCS

## 2024-02-15 PROCEDURE — 97112 NEUROMUSCULAR REEDUCATION: CPT | Performed by: PHYSICAL THERAPIST

## 2024-02-15 PROCEDURE — 72148 MRI LUMBAR SPINE W/O DYE: CPT

## 2024-02-15 PROCEDURE — 97140 MANUAL THERAPY 1/> REGIONS: CPT | Performed by: PHYSICAL THERAPIST

## 2024-02-15 NOTE — PROGRESS NOTES
"Daily Note     Today's date: 2/15/2024  Patient name: Shaun Mohr  : 1950  MRN: [id number]  Referring provider: Fan Cortes*  Dx:   Encounter Diagnosis     ICD-10-CM    1. Chronic bilateral low back pain with bilateral sciatica  M54.42     M54.41     G89.29                      Subjective: Shaun reports that his back is more sore today, notes having to  his dog and shovel which caused some pain.       Objective: See treatment diary below      Assessment: Tolerated treatment fair. Patient demonstrated fatigue post treatment, exhibited good technique with therapeutic exercises, and would benefit from continued PT.  Reduced intensity of session.       Plan: Continue per plan of care.         Precautions: HTN      Daily Treatment Diary      Manual 11/6 11/16 11/21 11/28 12/5 12/12 1/2 1/9 1/16 2/1 2/7 2/15   TPR R Piriformis           Done  PT    Cross body TFL stretch        done   Done  PT done   LSP joint mobs           PT Done (ES TPR)   Ther-Ex                      Bike (endurance) 5' lvl 1  5' lvl 1  5' lvl 1  5' lvl 1  5' lvl 1  5' lvl 1  5' lvl 1  5' lvl 1 5' lvl 1 5' lvl 1 5' lvl 1    Seated lumbar spine flx hands on ankles*    5x10s 5x10s np 10s x 5  nv       Seated fig 4 stretch*   4x15s 4x15 s Supine 5x10s Supine 5x10s Supine 5x 10s  Supine 10x5s Supine 10x5s Supine 10x5s 10x5s   MET ext L, flx r*               Prone quad stretch rocky 3x30\" ea 3x30\" ea 4x30\" 4x30\" 4x30s 4x30s w half foam 4 x 30s w/half foam 4x30s 4x30s 4x30\"     TFL stretch w strap        5x10s 5x10s 5x10s  5x10s                  Neuro-Re-ed               ADIM*                      ADIM marches*                      ADIM heel taps 3x10  3x10  dc              3x8   ADIM bicycles   nv    3 x 8 3x10 slow 3x10 3x10 3x10    Bridges w march* 3x12  3x12  3x12  3x12  3x12  3x12  3 x 12  3x15 3x15 3x15 3x15 3x12 bridges   Clamshells* BTB 3x12  np  Purple 3x10  Purple 3x10  Purple 3x10  Purple 3x10  purple   3 x 12  " "purple   3 x 15 Purple 3x15 Purple 3x15 Purple 3x15 Purple 3x15   Lateral band walks GTB 3 laps GTB 3 laps BTB 3 laps BTB 4 laps BTB 4 laps BTB 4 laps BTB   4 laps Purple 3 laps Purple 3 laps Purple 3 laps Purple 4 laps    Low Rows w ppt                                            Standing ppt               Ther-Act               Leg Press    SL Press             SL    Zone 1  65#   3x10    Step ups w fwd lean for glute drive  2x10 8\" 2x10 8\" 3x10 8\" 3x12 8\" 3x12 8\" w high knee 3x12 8\" w high knee 3x12 8\" w high knee 8\" 3x15  8\" 3x15 8\" 3x15                   Goblet squat w ppt 3x10 15#  3x10 15#  3x10 15#  3x12 15#  3x12 15#  3x12 15#  3 x 12 15#  3x15 15# 3x12 20# 3x12 20# 3x12 20# STS 3x12   Pallof Press w ppt             blue TB 2 x 10 nv  BTB 2x10 BTB 2x10 ea BTB 2x10 ea                          [place] walks w PPT                                                                                                                                                                                               Modalities                                                 *=on hep  PTOTTXEND@                                                         "

## 2024-02-16 ENCOUNTER — TELEPHONE (OUTPATIENT)
Age: 74
End: 2024-02-16

## 2024-02-16 DIAGNOSIS — M54.16 LUMBAR RADICULOPATHY: ICD-10-CM

## 2024-02-16 DIAGNOSIS — M48.062 LUMBAR STENOSIS WITH NEUROGENIC CLAUDICATION: Primary | ICD-10-CM

## 2024-02-16 NOTE — TELEPHONE ENCOUNTER
Caller: lissa Dunn    Doctor: Hunter    Reason for call: pt called stating that he completed his MRI yesterday.  Once available please call pt    Call back#: [phone]

## 2024-02-19 ENCOUNTER — OFFICE VISIT (OUTPATIENT)
Dept: PHYSICAL THERAPY | Facility: REHABILITATION | Age: 74
End: 2024-02-19
Payer: MEDICARE

## 2024-02-19 DIAGNOSIS — M54.42 CHRONIC BILATERAL LOW BACK PAIN WITH BILATERAL SCIATICA: Primary | ICD-10-CM

## 2024-02-19 DIAGNOSIS — G89.29 CHRONIC BILATERAL LOW BACK PAIN WITH BILATERAL SCIATICA: Primary | ICD-10-CM

## 2024-02-19 DIAGNOSIS — M54.41 CHRONIC BILATERAL LOW BACK PAIN WITH BILATERAL SCIATICA: Primary | ICD-10-CM

## 2024-02-19 PROCEDURE — 97110 THERAPEUTIC EXERCISES: CPT | Performed by: PHYSICAL THERAPIST

## 2024-02-19 PROCEDURE — 97112 NEUROMUSCULAR REEDUCATION: CPT | Performed by: PHYSICAL THERAPIST

## 2024-02-19 PROCEDURE — 97530 THERAPEUTIC ACTIVITIES: CPT | Performed by: PHYSICAL THERAPIST

## 2024-02-19 NOTE — TELEPHONE ENCOUNTER
S/W pt.  Advised pt of the same.  Pt wants the referral. Pt verbalized understanding.  Please advise.

## 2024-02-19 NOTE — TELEPHONE ENCOUNTER
Please let patient know that I reviewed the MRI lumbar spine and he has moderate to severe stenosis at L3-4 secondary to facet cyst and also spondylosis/disc bulging.  He has a new disc herniation at L4-5 where he has moderate stenosis worse towards the right.  Would recommend consultation with Dr. Aguilar

## 2024-02-19 NOTE — PROGRESS NOTES
"Daily Note     Today's date: 2024  Patient name: Shaun Mohr  : 1950  MRN: [id number]  Referring provider: Fan Cortes  Dx:   Encounter Diagnosis     ICD-10-CM    1. Chronic bilateral low back pain with bilateral sciatica  M54.42     M54.41     G89.29                      Subjective: Shaun reports that his back is improved since last session since he wasn't as active, however he needs to bring dog to vet again today.       Objective: See treatment diary below      Assessment: Tolerated treatment well. Patient demonstrated fatigue post treatment, exhibited good technique with therapeutic exercises, and would benefit from continued PT      Plan: Continue per plan of care.         Precautions: HTN      Daily Treatment Diary      Manual 11/21 11/28 12/5 12/12 1/2 1/9 1/16 2/1 2/7 2/15 2/19   TPR R Piriformis         Done  PT     Cross body TFL stretch      done   Done  PT done np   LSP joint mobs         PT Done (ES TPR) np   Ther-Ex                    Bike (endurance)  5' lvl 1  5' lvl 1  5' lvl 1  5' lvl 1  5' lvl 1  5' lvl 1 5' lvl 1 5' lvl 1 5' lvl 1  np   Seated lumbar spine flx hands on ankles*  5x10s 5x10s np 10s x 5  nv        Seated fig 4 stretch* 4x15s 4x15 s Supine 5x10s Supine 5x10s Supine 5x 10s  Supine 10x5s Supine 10x5s Supine 10x5s 10x5s 5x10s   MET ext L, flx r*              Prone quad stretch rocky 4x30\" 4x30\" 4x30s 4x30s w half foam 4 x 30s w/half foam 4x30s 4x30s 4x30\"      TFL stretch w strap      5x10s 5x10s 5x10s  5x10s 5x10s                 Neuro-Re-ed              ADIM*                    ADIM marches*                    ADIM heel taps  dc              3x8 3x10   ADIM bicycles nv    3 x 8 3x10 slow 3x10 3x10 3x10     Bridges w march*  3x12  3x12  3x12  3x12  3 x 12  3x15 3x15 3x15 3x15 3x12 bridges 3x12 15#   Clamshells*  Purple 3x10  Purple 3x10  Purple 3x10  Purple 3x10  purple   3 x 12  purple   3 x 15 Purple 3x15 Purple 3x15 Purple 3x15 Purple 3x15 Purple 3x15 " "  Lateral band walks BTB 3 laps BTB 4 laps BTB 4 laps BTB 4 laps BTB   4 laps Purple 3 laps Purple 3 laps Purple 3 laps Purple 4 laps  np   Low Rows w ppt                                        Standing ppt              Ther-Act              Leg Press    SL Press           SL    Zone 1  65#   3x10  SL zone 1 65# 2x10 ea   Step ups w fwd lean for glute drive  3x10 8\" 3x12 8\" 3x12 8\" w high knee 3x12 8\" w high knee 3x12 8\" w high knee 8\" 3x15  8\" 3x15 8\" 3x15                   Goblet squat w ppt  3x10 15#  3x12 15#  3x12 15#  3x12 15#  3 x 12 15#  3x15 15# 3x12 20# 3x12 20# 3x12 20# STS 3x12 3x10 20#   Pallof Press w ppt          blue TB 2 x 10 nv  BTB 2x10 BTB 2x10 ea BTB 2x10 ea  BTB 2x10s5 ea                       Thurston walks w PPT                                                                                                                                                                        Modalities                                           *=on hep  PTOTTXEND@                                                             "

## 2024-02-27 ENCOUNTER — OFFICE VISIT (OUTPATIENT)
Dept: PHYSICAL THERAPY | Facility: REHABILITATION | Age: 74
End: 2024-02-27
Payer: MEDICARE

## 2024-02-27 DIAGNOSIS — M54.41 CHRONIC BILATERAL LOW BACK PAIN WITH BILATERAL SCIATICA: Primary | ICD-10-CM

## 2024-02-27 DIAGNOSIS — M54.42 CHRONIC BILATERAL LOW BACK PAIN WITH BILATERAL SCIATICA: Primary | ICD-10-CM

## 2024-02-27 DIAGNOSIS — G89.29 CHRONIC BILATERAL LOW BACK PAIN WITH BILATERAL SCIATICA: Primary | ICD-10-CM

## 2024-02-27 PROCEDURE — 97112 NEUROMUSCULAR REEDUCATION: CPT | Performed by: PHYSICAL THERAPIST

## 2024-02-27 PROCEDURE — 97530 THERAPEUTIC ACTIVITIES: CPT | Performed by: PHYSICAL THERAPIST

## 2024-02-27 PROCEDURE — 97110 THERAPEUTIC EXERCISES: CPT | Performed by: PHYSICAL THERAPIST

## 2024-02-27 NOTE — PROGRESS NOTES
"Daily Note     Today's date: 2024  Patient name: Shaun Mohr  : 1950  MRN: [id number]  Referring provider: Fan Cortes  Dx:   Encounter Diagnosis     ICD-10-CM    1. Chronic bilateral low back pain with bilateral sciatica  M54.42     M54.41     G89.29           Start Time: 1130  Stop Time: 1215  Total time in clinic (min): 45 minutes    Subjective: Shaun reports that his back is about the same, pain mgmt put referral in to neurosurgery based on MRI results.   Will be reaching out to their office as he has not heard back.     Objective: See treatment diary below      Assessment: Tolerated treatment well. Patient demonstrated fatigue post treatment, exhibited good technique with therapeutic exercises, and would benefit from continued PT      Plan: Continue per plan of care.         Precautions: HTN      Daily Treatment Diary      Manual 12/5 12/12 1/2 1/9 1/16 2/1 2/7 2/15 2/19 2/27   TPR R Piriformis       Done  PT      Cross body TFL stretch    done   Done  PT done np    LSP joint mobs       PT Done (ES TPR) np    Ther-Ex                 Bike (endurance)  5' lvl 1  5' lvl 1  5' lvl 1  5' lvl 1 5' lvl 1 5' lvl 1 5' lvl 1  np 5' lvl 1   Seated lumbar spine flx hands on ankles* 5x10s np 10s x 5  nv         Seated fig 4 stretch* Supine 5x10s Supine 5x10s Supine 5x 10s  Supine 10x5s Supine 10x5s Supine 10x5s 10x5s 5x10s 5x10s   MET ext L, flx r*             Prone quad stretch rocky 4x30s 4x30s w half foam 4 x 30s w/half foam 4x30s 4x30s 4x30\"       TFL stretch w strap    5x10s 5x10s 5x10s  5x10s 5x10s 5x10s                Neuro-Re-ed             ADIM*                 ADIM marches*                 ADIM heel taps            3x8 3x10 np   ADIM bicycles   3 x 8 3x10 slow 3x10 3x10 3x10      Bridges w march*  3x12  3x12  3 x 12  3x15 3x15 3x15 3x15 3x12 bridges 3x12 15# 3x12 15#   Clamshells*  Purple 3x10  Purple 3x10  purple   3 x 12  purple   3 x 15 Purple 3x15 Purple 3x15 Purple 3x15 Purple " "3x15 Purple 3x15 Purple 3x15   Lateral band walks BTB 4 laps BTB 4 laps BTB   4 laps Purple 3 laps Purple 3 laps Purple 3 laps Purple 4 laps  np    Low Rows w ppt                                  Standing ppt             Ther-Act             Leg Press    SL Press         SL    Zone 1  65#   3x10  SL zone 1 65# 2x10 ea 65# 3x12 ea    Step ups w fwd lean for glute drive  3x12 8\" w high knee 3x12 8\" w high knee 3x12 8\" w high knee 8\" 3x15  8\" 3x15 8\" 3x15                   Goblet squat w ppt  3x12 15#  3x12 15#  3 x 12 15#  3x15 15# 3x12 20# 3x12 20# 3x12 20# STS 3x12 3x10 20# 3x10 20#   Pallof Press w ppt      blue TB 2 x 10 nv  BTB 2x10 BTB 2x10 ea BTB 2x10 ea  BTB 2x10s5 ea np                    Thurston walks w PPT                                                                                                                                                Modalities                                     *=on hep  PTOTTXEND@                                                                 "

## 2024-03-04 ENCOUNTER — APPOINTMENT (OUTPATIENT)
Dept: PHYSICAL THERAPY | Facility: REHABILITATION | Age: 74
End: 2024-03-04
Payer: MEDICARE

## 2024-03-07 ENCOUNTER — OFFICE VISIT (OUTPATIENT)
Dept: PHYSICAL THERAPY | Facility: REHABILITATION | Age: 74
End: 2024-03-07
Payer: MEDICARE

## 2024-03-07 ENCOUNTER — HOSPITAL ENCOUNTER (OUTPATIENT)
Dept: RADIOLOGY | Facility: HOSPITAL | Age: 74
Discharge: HOME | End: 2024-03-07
Payer: MEDICARE

## 2024-03-07 ENCOUNTER — CONSULT (OUTPATIENT)
Dept: NEUROSURGERY | Facility: CLINIC | Age: 74
End: 2024-03-07
Payer: MEDICARE

## 2024-03-07 VITALS
RESPIRATION RATE: 17 BRPM | BODY MASS INDEX: 23.34 KG/M2 | WEIGHT: 154 LBS | HEART RATE: 60 BPM | HEIGHT: 68 IN | OXYGEN SATURATION: 98 % | DIASTOLIC BLOOD PRESSURE: 88 MMHG | SYSTOLIC BLOOD PRESSURE: 142 MMHG | TEMPERATURE: 98 F

## 2024-03-07 DIAGNOSIS — M54.42 CHRONIC BILATERAL LOW BACK PAIN WITH BILATERAL SCIATICA: Primary | ICD-10-CM

## 2024-03-07 DIAGNOSIS — M54.41 CHRONIC BILATERAL LOW BACK PAIN WITH BILATERAL SCIATICA: Primary | ICD-10-CM

## 2024-03-07 DIAGNOSIS — M54.16 LUMBAR RADICULOPATHY: ICD-10-CM

## 2024-03-07 DIAGNOSIS — Z01.818 PRE-PROCEDURAL EXAMINATION: Primary | ICD-10-CM

## 2024-03-07 DIAGNOSIS — G89.29 CHRONIC BILATERAL LOW BACK PAIN WITH BILATERAL SCIATICA: Primary | ICD-10-CM

## 2024-03-07 DIAGNOSIS — M48.062 LUMBAR STENOSIS WITH NEUROGENIC CLAUDICATION: ICD-10-CM

## 2024-03-07 PROCEDURE — 97110 THERAPEUTIC EXERCISES: CPT | Performed by: PHYSICAL THERAPIST

## 2024-03-07 PROCEDURE — 97112 NEUROMUSCULAR REEDUCATION: CPT | Performed by: PHYSICAL THERAPIST

## 2024-03-07 PROCEDURE — 97530 THERAPEUTIC ACTIVITIES: CPT | Performed by: PHYSICAL THERAPIST

## 2024-03-07 PROCEDURE — 99204 OFFICE O/P NEW MOD 45 MIN: CPT | Performed by: STUDENT IN AN ORGANIZED HEALTH CARE EDUCATION/TRAINING PROGRAM

## 2024-03-07 PROCEDURE — 72110 X-RAY EXAM L-2 SPINE 4/>VWS: CPT

## 2024-03-07 RX ORDER — CHLORHEXIDINE GLUCONATE ORAL RINSE 1.2 MG/ML
15 SOLUTION DENTAL ONCE
OUTPATIENT
Start: 2024-03-07 | End: 2024-03-07

## 2024-03-07 RX ORDER — NAPROXEN SOD/DIPHENHYDRAMINE 220MG-25MG
200 TABLET ORAL
COMMUNITY

## 2024-03-07 NOTE — PROGRESS NOTES
"Daily Note     Today's date: 3/7/2024  Patient name: Shaun Mohr  : 1950  MRN: [id number]  Referring provider: Fan Cortes*  Dx:   Encounter Diagnosis     ICD-10-CM    1. Chronic bilateral low back pain with bilateral sciatica  M54.42     M54.41     G89.29                      Subjective: Shaun reports that his back continues to be quite painful. Saw surgeon today who advised surgery, additiaonl imagain ordered, will be reaching out to schedule this procedure.       Objective: See treatment diary below      Assessment: Tolerated treatment fair. Patient demonstrated fatigue post treatment and exhibited good technique with therapeutic exercises.  Due to lack of resolution of symptoms nor continued improvement with PT, at this time, we'll place Shaun on hold until expiration of his POC. Contact info given for him to reach out once surgery scheduled and Surgeon orders provided.        Plan: Continue per plan of care.         Precautions: HTN      Daily Treatment Diary      Manual 12/5 12/12 1/2 1/9 1/16 2/1 2/7 2/15 2/19 2/27 3/7   TPR R Piriformis       Done  PT       Cross body TFL stretch    done   Done  PT done np     LSP joint mobs       PT Done (ES TPR) np     Ther-Ex                  Bike (endurance)  5' lvl 1  5' lvl 1  5' lvl 1  5' lvl 1 5' lvl 1 5' lvl 1 5' lvl 1  np 5' lvl 1    Seated lumbar spine flx hands on ankles* 5x10s np 10s x 5  nv          Seated fig 4 stretch* Supine 5x10s Supine 5x10s Supine 5x 10s  Supine 10x5s Supine 10x5s Supine 10x5s 10x5s 5x10s 5x10s 5x10s   MET ext L, flx r*              Prone quad stretch rocky 4x30s 4x30s w half foam 4 x 30s w/half foam 4x30s 4x30s 4x30\"        TFL stretch w strap    5x10s 5x10s 5x10s  5x10s 5x10s 5x10s 5x10s                 Neuro-Re-ed              ADIM*                  ADIM marches*                  ADIM heel taps            3x8 3x10 np    ADIM bicycles   3 x 8 3x10 slow 3x10 3x10 3x10       Bridges w march*  3x12  3x12  3 x 12  " "3x15 3x15 3x15 3x15 3x12 bridges 3x12 15# 3x12 15# 3x12 bw   Clamshells*  Purple 3x10  Purple 3x10  purple   3 x 12  purple   3 x 15 Purple 3x15 Purple 3x15 Purple 3x15 Purple 3x15 Purple 3x15 Purple 3x15 Purple 3x15   Lateral band walks BTB 4 laps BTB 4 laps BTB   4 laps Purple 3 laps Purple 3 laps Purple 3 laps Purple 4 laps  np     Low Rows w ppt                                    Standing ppt              Ther-Act              Leg Press    SL Press         SL    Zone 1  65#   3x10  SL zone 1 65# 2x10 ea 65# 3x12 ea     Step ups w fwd lean for glute drive  3x12 8\" w high knee 3x12 8\" w high knee 3x12 8\" w high knee 8\" 3x15  8\" 3x15 8\" 3x15                     Goblet squat w ppt  3x12 15#  3x12 15#  3 x 12 15#  3x15 15# 3x12 20# 3x12 20# 3x12 20# STS 3x12 3x10 20# 3x10 20# 3x10 bw   Pallof Press w ppt      blue TB 2 x 10 nv  BTB 2x10 BTB 2x10 ea BTB 2x10 ea  BTB 2x10s5 ea np                      Thurston walks w PPT                                                                                                                                                        Modalities                                       *=on hep  PTOTTXEND@                                                                     "

## 2024-03-07 NOTE — PROGRESS NOTES
Assessment/Plan:  74-year-old male presents to clinic for evaluation of lower extremity pain that is primarily worse with upright positioning or activity that has been present for several years and progressively getting worse over the last 6 months. MRI lumbar spine which showed grade 1 L3-4 spondylolisthesis and severe central canal stenosis at L3-4.  I reviewed the imaging with him and his wife.  Overall he has multiple areas of degenerative disc disease however the worst seems to be at L3-4 where he has a grade 1 spondylolisthesis and severe lumbar stenosis.  His symptoms fit more of a neurogenic claudication picture.  Although he does have areas of moderate to severe foraminal stenosis I think the majority of his symptoms are coming from the lumbar stenosis at L3-4.  He is currently undergoing physical therapy and it has not helped him thus far but he has not undergone an epidural steroid injection in the last year.  I discussed treatment options for him which included continued conservative management with physical therapy and an epidural steroid injection, MIS laminectomy at L3-4, L3-4 TLIF, and L3-S1 TLIF to treat all of the areas of foraminal stenosis.  Overall given his symptoms align more with neurogenic claudication rather than focal radiculopathy from foraminal stenosis, the lumbar stenosis at L3-4 is much worse on the left compared to the right and his symptoms are worse on the left, whereas his foraminal stenosis is bilateral, his lack of significant back pain, I recommended starting with a left-sided approach L3-4 MIS laminectomy for further treatment.  I stated that this would be reasonable to start with this and to see how much his symptoms resolve and if he still has leg pain or develops back pain in the future that are more in line with foraminal stenosis/radiculopathy/degenerative disc disease/disc collapse picture then it would be reasonable to do the larger fusion surgeries in the future.  I  discussed risk and benefits of the surgery along with alternatives, all questions were answered, he agreed to proceed and consent was obtained.  I discussed with him that prior to the surgery he needs a lumbar x-ray flex ex to evaluate for underlying instability particularly at L3-4 where he has a grade 1 spondylolisthesis.  If he does have underlying instability at this level then we would need to change his surgery to an L3-4 TLIF.    I spent 45 minutes obtaining history and physical exam, reviewing imaging, discussing treatment options, and coordinating care.        Subjective:      Patient ID: Shaun Mohr is a 74 y.o. male.    HPI    74-year-old male presents to clinic for evaluation of lower extremity pain that is primarily worse with upright positioning or activity that has been present for several years and progressively getting worse over the last 6 months.  He does not have any significant back pain and his symptoms are largely leg pain and they are largely on the left side.  He states that if he is sitting or at rest he has relatively little pain but he cannot walk more than 50 feet or so before he develops severe bilateral lower extremity pain, much worse on the left side.  The pain will radiate down his buttock and anterior thighs.  He has to rest for approximately 10 to 15 minutes and rub his thighs in order for the pain to subside before he can begin walking again.  He has tried an epidural steroid injection 2 years ago that did not give him relief but he has not tried any epidural steroid injections in the last year.  He is currently doing physical therapy and it is not helping his pain.  He had a MRI lumbar spine which showed grade 1 L3-4 spondylolisthesis and severe central canal stenosis at L3-4.  He also has degenerative disc disease at L3-4 and L4-5 and L5-S1 and foraminal stenosis.  He is referred to neurosurgery for further evaluation.    The following portions of the patient's history were  "reviewed and updated as appropriate: allergies, current medications, past family history, past medical history, past social history, past surgical history, and problem list.    Review of Systems      Objective:      /88 (BP Location: Right arm, Patient Position: Sitting, Cuff Size: Standard)   Pulse 60   Temp 98 °F (36.7 °C) (Temporal)   Resp 17   Ht 5' 8\" (1.727 m)   Wt 69.9 kg (154 lb)   SpO2 98%   BMI 23.42 kg/m²          Physical Exam    A&OX3  Gaze conjugate  EOMI  FS  TM  Fcx4  5/5 BUE  5/5 BLE  SILT  No clonus  No enamorado  No babinski  "

## 2024-03-11 ENCOUNTER — OFFICE VISIT (OUTPATIENT)
Dept: FAMILY MEDICINE CLINIC | Facility: OTHER | Age: 74
End: 2024-03-11
Payer: MEDICARE

## 2024-03-11 VITALS
HEART RATE: 80 BPM | BODY MASS INDEX: 23.61 KG/M2 | SYSTOLIC BLOOD PRESSURE: 128 MMHG | RESPIRATION RATE: 17 BRPM | OXYGEN SATURATION: 97 % | WEIGHT: 155.8 LBS | DIASTOLIC BLOOD PRESSURE: 70 MMHG | HEIGHT: 68 IN | TEMPERATURE: 97.8 F

## 2024-03-11 DIAGNOSIS — M54.41 CHRONIC BILATERAL LOW BACK PAIN WITH BILATERAL SCIATICA: Primary | ICD-10-CM

## 2024-03-11 DIAGNOSIS — M54.42 CHRONIC BILATERAL LOW BACK PAIN WITH BILATERAL SCIATICA: Primary | ICD-10-CM

## 2024-03-11 DIAGNOSIS — G89.29 CHRONIC BILATERAL LOW BACK PAIN WITH BILATERAL SCIATICA: Primary | ICD-10-CM

## 2024-03-11 PROCEDURE — 99213 OFFICE O/P EST LOW 20 MIN: CPT | Performed by: FAMILY MEDICINE

## 2024-03-11 PROCEDURE — G2211 COMPLEX E/M VISIT ADD ON: HCPCS | Performed by: FAMILY MEDICINE

## 2024-03-11 NOTE — PROGRESS NOTES
Assessment/Plan:    Chronic bilateral low back pain with bilateral sciatica  - Chronic ongoing low back pain with paresthesias down his legs without relief after PT, OMT, steroids, muscle relaxants, pain management  - MRI 2/15 noting new disc herniation at L4-5 with nerve impingement   - Patient planned to undergo back surgery on 3/26/24    Plan  - F/u with Neurosurgery  - Pro-op clearance with Dr. Rocio Virgen and all orders for this visit:    Chronic bilateral low back pain with bilateral sciatica          Subjective:      Patient ID: Shaun Mohr is a 74 y.o. male.    74 yoM presenting for follow up regarding ongoing low back pain not improved with trial of steroids, muscles relaxants, OMT, PT. Patient is now planning on proceeding with low back surgery on 3/26/24. Patient currently taking 2 Aleve per day, 300 Gabapentin TID which will he will hopefully be able to come off post surgery. He is without any other acute concerns or complaints. He is otherwise clear for surgery.         The following portions of the patient's history were reviewed and updated as appropriate: allergies, current medications, past family history, past medical history, past social history, past surgical history, and problem list.    Review of Systems   Constitutional:  Negative for chills and fever.   HENT:  Negative for ear pain and sore throat.    Eyes:  Negative for pain and visual disturbance.   Respiratory:  Negative for cough and shortness of breath.    Cardiovascular:  Negative for chest pain and palpitations.   Gastrointestinal:  Negative for abdominal pain and vomiting.   Genitourinary:  Negative for dysuria and hematuria.   Musculoskeletal:  Positive for back pain. Negative for arthralgias and gait problem.   Skin:  Negative for color change and rash.   Neurological:  Negative for dizziness, light-headedness and headaches.   All other systems reviewed and are negative.        Objective:      /70   Pulse 80    "Temp 97.8 °F (36.6 °C)   Resp 17   Ht 5' 8\" (1.727 m)   Wt 70.7 kg (155 lb 12.8 oz)   SpO2 97%   BMI 23.69 kg/m²          Physical Exam  Constitutional:       General: He is not in acute distress.     Appearance: Normal appearance. He is not ill-appearing.   HENT:      Head: Normocephalic and atraumatic.      Right Ear: External ear normal.      Left Ear: External ear normal.      Nose: Nose normal.   Eyes:      Extraocular Movements: Extraocular movements intact.      Conjunctiva/sclera: Conjunctivae normal.   Cardiovascular:      Rate and Rhythm: Normal rate and regular rhythm.      Pulses: Normal pulses.      Heart sounds: Normal heart sounds.   Pulmonary:      Effort: Pulmonary effort is normal.      Breath sounds: Normal breath sounds. No wheezing, rhonchi or rales.   Abdominal:      General: Abdomen is flat. There is no distension.      Palpations: Abdomen is soft. There is no mass.      Tenderness: There is no abdominal tenderness. There is no guarding.   Musculoskeletal:      Right lower leg: No edema.      Left lower leg: No edema.   Skin:     General: Skin is warm and dry.   Neurological:      Mental Status: He is alert.      Motor: No weakness.      Gait: Gait normal.           "

## 2024-03-12 ENCOUNTER — APPOINTMENT (OUTPATIENT)
Dept: LAB | Facility: CLINIC | Age: 74
End: 2024-03-12
Payer: MEDICARE

## 2024-03-12 DIAGNOSIS — Z01.818 PRE-PROCEDURAL EXAMINATION: ICD-10-CM

## 2024-03-12 DIAGNOSIS — M54.16 LUMBAR RADICULOPATHY: ICD-10-CM

## 2024-03-12 DIAGNOSIS — M48.062 LUMBAR STENOSIS WITH NEUROGENIC CLAUDICATION: ICD-10-CM

## 2024-03-12 LAB
ALBUMIN SERPL BCP-MCNC: 4.2 G/DL (ref 3.5–5)
ALP SERPL-CCNC: 46 U/L (ref 34–104)
ALT SERPL W P-5'-P-CCNC: 16 U/L (ref 7–52)
ANION GAP SERPL CALCULATED.3IONS-SCNC: 7 MMOL/L (ref 4–13)
APTT PPP: 38 SECONDS (ref 23–37)
AST SERPL W P-5'-P-CCNC: 20 U/L (ref 13–39)
BACTERIA UR QL AUTO: ABNORMAL /HPF
BASOPHILS # BLD AUTO: 0.04 THOUSANDS/ÂΜL (ref 0–0.1)
BASOPHILS NFR BLD AUTO: 1 % (ref 0–1)
BILIRUB SERPL-MCNC: 0.45 MG/DL (ref 0.2–1)
BILIRUB UR QL STRIP: NEGATIVE
BUN SERPL-MCNC: 27 MG/DL (ref 5–25)
CALCIUM SERPL-MCNC: 9.3 MG/DL (ref 8.4–10.2)
CHLORIDE SERPL-SCNC: 102 MMOL/L (ref 96–108)
CLARITY UR: CLEAR
CO2 SERPL-SCNC: 32 MMOL/L (ref 21–32)
COLOR UR: YELLOW
CREAT SERPL-MCNC: 1.22 MG/DL (ref 0.6–1.3)
EOSINOPHIL # BLD AUTO: 0.15 THOUSAND/ÂΜL (ref 0–0.61)
EOSINOPHIL NFR BLD AUTO: 3 % (ref 0–6)
ERYTHROCYTE [DISTWIDTH] IN BLOOD BY AUTOMATED COUNT: 14.2 % (ref 11.6–15.1)
EST. AVERAGE GLUCOSE BLD GHB EST-MCNC: 137 MG/DL
GFR SERPL CREATININE-BSD FRML MDRD: 58 ML/MIN/1.73SQ M
GLUCOSE P FAST SERPL-MCNC: 124 MG/DL (ref 65–99)
GLUCOSE UR STRIP-MCNC: NEGATIVE MG/DL
HBA1C MFR BLD: 6.4 %
HCT VFR BLD AUTO: 44.8 % (ref 36.5–49.3)
HGB BLD-MCNC: 14.5 G/DL (ref 12–17)
HGB UR QL STRIP.AUTO: NEGATIVE
HYALINE CASTS #/AREA URNS LPF: ABNORMAL /LPF
IMM GRANULOCYTES # BLD AUTO: 0.01 THOUSAND/UL (ref 0–0.2)
IMM GRANULOCYTES NFR BLD AUTO: 0 % (ref 0–2)
INR PPP: 1.03 (ref 0.84–1.19)
KETONES UR STRIP-MCNC: NEGATIVE MG/DL
LEUKOCYTE ESTERASE UR QL STRIP: NEGATIVE
LYMPHOCYTES # BLD AUTO: 0.96 THOUSANDS/ÂΜL (ref 0.6–4.47)
LYMPHOCYTES NFR BLD AUTO: 20 % (ref 14–44)
MCH RBC QN AUTO: 30.3 PG (ref 26.8–34.3)
MCHC RBC AUTO-ENTMCNC: 32.4 G/DL (ref 31.4–37.4)
MCV RBC AUTO: 94 FL (ref 82–98)
MONOCYTES # BLD AUTO: 0.53 THOUSAND/ÂΜL (ref 0.17–1.22)
MONOCYTES NFR BLD AUTO: 11 % (ref 4–12)
MUCOUS THREADS UR QL AUTO: ABNORMAL
NEUTROPHILS # BLD AUTO: 3.02 THOUSANDS/ÂΜL (ref 1.85–7.62)
NEUTS SEG NFR BLD AUTO: 65 % (ref 43–75)
NITRITE UR QL STRIP: NEGATIVE
NON-SQ EPI CELLS URNS QL MICRO: ABNORMAL /HPF
NRBC BLD AUTO-RTO: 0 /100 WBCS
PH UR STRIP.AUTO: 5.5 [PH]
PLATELET # BLD AUTO: 191 THOUSANDS/UL (ref 149–390)
PMV BLD AUTO: 12.3 FL (ref 8.9–12.7)
POTASSIUM SERPL-SCNC: 4.7 MMOL/L (ref 3.5–5.3)
PROT SERPL-MCNC: 6.3 G/DL (ref 6.4–8.4)
PROT UR STRIP-MCNC: ABNORMAL MG/DL
PROTHROMBIN TIME: 13.4 SECONDS (ref 11.6–14.5)
RBC # BLD AUTO: 4.79 MILLION/UL (ref 3.88–5.62)
RBC #/AREA URNS AUTO: ABNORMAL /HPF
SODIUM SERPL-SCNC: 141 MMOL/L (ref 135–147)
SP GR UR STRIP.AUTO: 1.03 (ref 1–1.03)
UROBILINOGEN UR STRIP-ACNC: <2 MG/DL
WBC # BLD AUTO: 4.71 THOUSAND/UL (ref 4.31–10.16)
WBC #/AREA URNS AUTO: ABNORMAL /HPF

## 2024-03-12 PROCEDURE — 85610 PROTHROMBIN TIME: CPT

## 2024-03-12 PROCEDURE — 85730 THROMBOPLASTIN TIME PARTIAL: CPT

## 2024-03-12 PROCEDURE — 81001 URINALYSIS AUTO W/SCOPE: CPT

## 2024-03-12 PROCEDURE — 85025 COMPLETE CBC W/AUTO DIFF WBC: CPT

## 2024-03-12 PROCEDURE — 80053 COMPREHEN METABOLIC PANEL: CPT

## 2024-03-12 PROCEDURE — 36415 COLL VENOUS BLD VENIPUNCTURE: CPT

## 2024-03-12 PROCEDURE — 83036 HEMOGLOBIN GLYCOSYLATED A1C: CPT

## 2024-03-14 ENCOUNTER — CONSULT (OUTPATIENT)
Dept: FAMILY MEDICINE CLINIC | Facility: OTHER | Age: 74
End: 2024-03-14
Payer: MEDICARE

## 2024-03-14 VITALS
TEMPERATURE: 97.6 F | RESPIRATION RATE: 18 BRPM | OXYGEN SATURATION: 98 % | DIASTOLIC BLOOD PRESSURE: 72 MMHG | SYSTOLIC BLOOD PRESSURE: 136 MMHG | HEIGHT: 68 IN | BODY MASS INDEX: 23.76 KG/M2 | WEIGHT: 156.8 LBS | HEART RATE: 66 BPM

## 2024-03-14 DIAGNOSIS — Z01.818 PRE-OP EVALUATION: Primary | ICD-10-CM

## 2024-03-14 DIAGNOSIS — I49.3 PVC'S (PREMATURE VENTRICULAR CONTRACTIONS): ICD-10-CM

## 2024-03-14 DIAGNOSIS — I10 BENIGN ESSENTIAL HYPERTENSION: ICD-10-CM

## 2024-03-14 PROCEDURE — 99213 OFFICE O/P EST LOW 20 MIN: CPT | Performed by: STUDENT IN AN ORGANIZED HEALTH CARE EDUCATION/TRAINING PROGRAM

## 2024-03-14 PROCEDURE — 93000 ELECTROCARDIOGRAM COMPLETE: CPT | Performed by: STUDENT IN AN ORGANIZED HEALTH CARE EDUCATION/TRAINING PROGRAM

## 2024-03-14 NOTE — PROGRESS NOTES
"PRE-OPERATIVE EXAMINATION  Shaun Mohr  1950    Shaun Mohr is a 74 y.o. male with lummbar radiculopathy who is planning to undergo L3-L4 laminectomy under general by Dr. Aguilar on 3/26/2024. The procedure is indicated for the following condition: lumbar radiculopathy. Patient has not had complications with anesthesia in the past.  Patient has all his teeth.     Patient reports known history of irregular heart beats diagnosed about 5 years ago which he states was evaluated and found to be benign.     ROS:   Chest pain: no   Palpitations: no  Shortness of breath: no  Shortness of breath with exertion: no  Orthopnea: no  Dizziness: no  Unexplained weight change: no    PMH:  CAD: no  HTN: yes  CKD: no  DM: no, patient is prediabetic diet controlled  History of CVA: no    He  reports that he quit smoking about 45 years ago. His smoking use included cigarettes. He started smoking about 55 years ago. He has a 10 pack-year smoking history. He has never used smokeless tobacco. He reports current alcohol use of about 1.0 standard drink of alcohol per week. He reports that he does not use drugs.    /72   Pulse 66   Temp 97.6 °F (36.4 °C)   Resp 18   Ht 5' 8\" (1.727 m)   Wt 71.1 kg (156 lb 12.8 oz)   SpO2 98%   BMI 23.84 kg/m²   Physical Exam    Revised Cardiac Risk Index (RCRI) for Pre-Operative Risk   (estimates risk of cardiac complications after noncardiac surgery)    High-risk surgery: No 0 / Yes +1  Intraperitoneal, intrathoracic, suprainguinal vascular  History of ischemic heart disease: No 0 / Yes +1  Hx of MI, (+) exercise test, current chest pain considered due to myocardial ischemia, use of nitrate therapy or ECG with pathological Q waves)  History of CHF: No 0 / Yes +1  Pulmonary edema, B/L rales or S3 gallop; LEDEZMA, orthopnea, PND, CXR showing pulmonary vascular redistribution)  History of cerebrovascular disease: No 0 / Yes +1  Prior TIA or stroke  Pre-operative treatment with insulin: No 0 " / Yes +1  Pre-operative creatinine >2 mg/dL: No 0 / Yes +1    RCRI Scorin points: Class I Risk, 3.9% 30-day risk of death, MI, or cardiac arrest  1 point: Class II Risk, 6.0% 30-day risk of death, MI, or cardiac arrest  2 points: Class III Risk, 10.1% 30-day risk of death, MI, or cardiac arrest  3 points: Class IV Risk, 15% 30-day risk of death, MI, or cardiac arrest  4 points: Class IV Risk, 15% 30-day risk of death, MI, or cardiac arrest  5 points: Class IV Risk, 15% 30-day risk of death, MI, or cardiac arrest  6 points: Class IV Risk, 15% 30-day risk of death, MI, or cardiac arrest    Lab Results   Component Value Date    CREATININE 1.22 2024       Shaun was seen today for pre-op exam.    Diagnoses and all orders for this visit:    Pre-op evaluation  -     POCT ECG        Recommendations:  Shaun Mohr is undergoing an elective Moderate Risk surgery, Lumbar Laminectomy. He is RCRI class I risk (0 points) with 3.9% 30-day risk of death, MI, or cardiac arrest. He may proceed with surgery as planned without further workup.

## 2024-03-15 ENCOUNTER — HOSPITAL ENCOUNTER (OUTPATIENT)
Dept: NON INVASIVE DIAGNOSTICS | Facility: CLINIC | Age: 74
Discharge: HOME | End: 2024-03-15
Payer: MEDICARE

## 2024-03-15 VITALS
HEART RATE: 65 BPM | BODY MASS INDEX: 23.76 KG/M2 | DIASTOLIC BLOOD PRESSURE: 72 MMHG | HEIGHT: 68 IN | WEIGHT: 156.75 LBS | SYSTOLIC BLOOD PRESSURE: 136 MMHG

## 2024-03-15 DIAGNOSIS — I49.3 PVC'S (PREMATURE VENTRICULAR CONTRACTIONS): ICD-10-CM

## 2024-03-15 PROCEDURE — 93306 TTE W/DOPPLER COMPLETE: CPT | Performed by: INTERNAL MEDICINE

## 2024-03-15 PROCEDURE — 93306 TTE W/DOPPLER COMPLETE: CPT

## 2024-03-17 LAB
AORTIC ROOT: 3.1 CM
APICAL FOUR CHAMBER EJECTION FRACTION: 62 %
ASCENDING AORTA: 2.9 CM
BSA FOR ECHO PROCEDURE: 1.84 M2
E WAVE DECELERATION TIME: 189 MS
E/A RATIO: 0.88
FRACTIONAL SHORTENING: 34 (ref 28–44)
INTERVENTRICULAR SEPTUM IN DIASTOLE (PARASTERNAL SHORT AXIS VIEW): 1 CM
INTERVENTRICULAR SEPTUM: 1 CM (ref 0.6–1.1)
LAAS-AP2: 18 CM2
LAAS-AP4: 17.7 CM2
LEFT ATRIUM AREA SYSTOLE SINGLE PLANE A4C: 18.6 CM2
LEFT ATRIUM SIZE: 3.9 CM
LEFT ATRIUM VOLUME (MOD BIPLANE): 54 ML
LEFT ATRIUM VOLUME INDEX (MOD BIPLANE): 29.3 ML/M2
LEFT INTERNAL DIMENSION IN SYSTOLE: 3.1 CM (ref 2.1–4)
LEFT VENTRICLE DIASTOLIC VOLUME (MOD BIPLANE): 58 ML
LEFT VENTRICLE DIASTOLIC VOLUME INDEX (MOD BIPLANE): 31.5 ML/M2
LEFT VENTRICLE SYSTOLIC VOLUME (MOD BIPLANE): 25 ML
LEFT VENTRICLE SYSTOLIC VOLUME INDEX (MOD BIPLANE): 13.6 ML/M2
LEFT VENTRICULAR INTERNAL DIMENSION IN DIASTOLE: 4.7 CM (ref 3.5–6)
LEFT VENTRICULAR POSTERIOR WALL IN END DIASTOLE: 1 CM
LEFT VENTRICULAR STROKE VOLUME: 63 ML
LV EF: 58 %
LVSV (TEICH): 63 ML
MV E'TISSUE VEL-SEP: 8 CM/S
MV PEAK A VEL: 0.59 M/S
MV PEAK E VEL: 52 CM/S
MV STENOSIS PRESSURE HALF TIME: 55 MS
MV VALVE AREA P 1/2 METHOD: 4
RIGHT ATRIUM AREA SYSTOLE A4C: 15.6 CM2
RIGHT VENTRICLE ID DIMENSION: 3.1 CM
SL CV LEFT ATRIUM LENGTH A2C: 5.3 CM
SL CV LV EF: 60
SL CV PED ECHO LEFT VENTRICLE DIASTOLIC VOLUME (MOD BIPLANE) 2D: 102 ML
SL CV PED ECHO LEFT VENTRICLE SYSTOLIC VOLUME (MOD BIPLANE) 2D: 39 ML
TR MAX PG: 26 MMHG
TR PEAK VELOCITY: 2.5 M/S
TRICUSPID ANNULAR PLANE SYSTOLIC EXCURSION: 2.5 CM
TRICUSPID VALVE PEAK REGURGITATION VELOCITY: 2.54 M/S

## 2024-03-21 ENCOUNTER — ANESTHESIA EVENT (OUTPATIENT)
Dept: PERIOP | Facility: HOSPITAL | Age: 74
End: 2024-03-21
Payer: MEDICARE

## 2024-03-21 RX ORDER — COVID-19 ANTIGEN TEST
1 KIT MISCELLANEOUS AS NEEDED
COMMUNITY
End: 2024-03-26

## 2024-03-25 NOTE — ASSESSMENT & PLAN NOTE
- Chronic ongoing low back pain with paresthesias down his legs without relief after PT, OMT, steroids, muscle relaxants, pain management  - MRI 2/15 noting new disc herniation at L4-5 with nerve impingement   - Patient planned to undergo back surgery on 3/26/24    Plan  - F/u with Neurosurgery  - Pro-op clearance with Dr. Chan

## 2024-03-26 ENCOUNTER — ANESTHESIA (OUTPATIENT)
Dept: PERIOP | Facility: HOSPITAL | Age: 74
End: 2024-03-26
Payer: MEDICARE

## 2024-03-26 ENCOUNTER — APPOINTMENT (OUTPATIENT)
Dept: RADIOLOGY | Facility: HOSPITAL | Age: 74
End: 2024-03-26
Payer: MEDICARE

## 2024-03-26 ENCOUNTER — DOCUMENTATION (OUTPATIENT)
Dept: NEUROSURGERY | Facility: CLINIC | Age: 74
End: 2024-03-26

## 2024-03-26 ENCOUNTER — HOSPITAL ENCOUNTER (OUTPATIENT)
Facility: HOSPITAL | Age: 74
Setting detail: OUTPATIENT SURGERY
Discharge: HOME | End: 2024-03-26
Attending: STUDENT IN AN ORGANIZED HEALTH CARE EDUCATION/TRAINING PROGRAM | Admitting: STUDENT IN AN ORGANIZED HEALTH CARE EDUCATION/TRAINING PROGRAM
Payer: MEDICARE

## 2024-03-26 VITALS
OXYGEN SATURATION: 94 % | WEIGHT: 151.01 LBS | SYSTOLIC BLOOD PRESSURE: 130 MMHG | DIASTOLIC BLOOD PRESSURE: 82 MMHG | RESPIRATION RATE: 20 BRPM | HEIGHT: 68 IN | TEMPERATURE: 97.8 F | BODY MASS INDEX: 22.89 KG/M2 | HEART RATE: 78 BPM

## 2024-03-26 DIAGNOSIS — M54.16 LUMBAR RADICULITIS: Primary | ICD-10-CM

## 2024-03-26 DIAGNOSIS — M43.16 SPONDYLOLISTHESIS OF LUMBAR REGION: ICD-10-CM

## 2024-03-26 PROCEDURE — 72100 X-RAY EXAM L-S SPINE 2/3 VWS: CPT

## 2024-03-26 PROCEDURE — 63047 LAM FACETEC & FORAMOT LUMBAR: CPT | Performed by: STUDENT IN AN ORGANIZED HEALTH CARE EDUCATION/TRAINING PROGRAM

## 2024-03-26 PROCEDURE — NC001 PR NO CHARGE: Performed by: STUDENT IN AN ORGANIZED HEALTH CARE EDUCATION/TRAINING PROGRAM

## 2024-03-26 RX ORDER — LIDOCAINE HYDROCHLORIDE AND EPINEPHRINE 10; 10 MG/ML; UG/ML
INJECTION, SOLUTION INFILTRATION; PERINEURAL AS NEEDED
Status: DISCONTINUED | OUTPATIENT
Start: 2024-03-26 | End: 2024-03-26 | Stop reason: HOSPADM

## 2024-03-26 RX ORDER — ONDANSETRON 2 MG/ML
4 INJECTION INTRAMUSCULAR; INTRAVENOUS ONCE AS NEEDED
Status: DISCONTINUED | OUTPATIENT
Start: 2024-03-26 | End: 2024-03-26 | Stop reason: HOSPADM

## 2024-03-26 RX ORDER — ALBUTEROL SULFATE 2.5 MG/3ML
2.5 SOLUTION RESPIRATORY (INHALATION) ONCE AS NEEDED
Status: DISCONTINUED | OUTPATIENT
Start: 2024-03-26 | End: 2024-03-26 | Stop reason: HOSPADM

## 2024-03-26 RX ORDER — OXYCODONE HYDROCHLORIDE 5 MG/1
5 TABLET ORAL EVERY 4 HOURS PRN
Qty: 12 TABLET | Refills: 0 | Status: SHIPPED | OUTPATIENT
Start: 2024-03-26 | End: 2024-04-05

## 2024-03-26 RX ORDER — ASPIRIN 81 MG/1
81 TABLET, CHEWABLE ORAL
COMMUNITY
Start: 2024-04-02

## 2024-03-26 RX ORDER — CHLORHEXIDINE GLUCONATE ORAL RINSE 1.2 MG/ML
15 SOLUTION DENTAL ONCE
Status: COMPLETED | OUTPATIENT
Start: 2024-03-26 | End: 2024-03-26

## 2024-03-26 RX ORDER — ROCURONIUM BROMIDE 10 MG/ML
INJECTION, SOLUTION INTRAVENOUS AS NEEDED
Status: DISCONTINUED | OUTPATIENT
Start: 2024-03-26 | End: 2024-03-26

## 2024-03-26 RX ORDER — OXYCODONE HYDROCHLORIDE 5 MG/1
5 TABLET ORAL EVERY 4 HOURS PRN
Status: COMPLETED | OUTPATIENT
Start: 2024-03-26 | End: 2024-03-26

## 2024-03-26 RX ORDER — DEXAMETHASONE SODIUM PHOSPHATE 10 MG/ML
INJECTION, SOLUTION INTRAMUSCULAR; INTRAVENOUS AS NEEDED
Status: DISCONTINUED | OUTPATIENT
Start: 2024-03-26 | End: 2024-03-26

## 2024-03-26 RX ORDER — HYDROMORPHONE HCL IN WATER/PF 6 MG/30 ML
0.2 PATIENT CONTROLLED ANALGESIA SYRINGE INTRAVENOUS
Status: DISCONTINUED | OUTPATIENT
Start: 2024-03-26 | End: 2024-03-26 | Stop reason: HOSPADM

## 2024-03-26 RX ORDER — SODIUM CHLORIDE, SODIUM LACTATE, POTASSIUM CHLORIDE, CALCIUM CHLORIDE 600; 310; 30; 20 MG/100ML; MG/100ML; MG/100ML; MG/100ML
INJECTION, SOLUTION INTRAVENOUS CONTINUOUS PRN
Status: DISCONTINUED | OUTPATIENT
Start: 2024-03-26 | End: 2024-03-26

## 2024-03-26 RX ORDER — ONDANSETRON 2 MG/ML
INJECTION INTRAMUSCULAR; INTRAVENOUS AS NEEDED
Status: DISCONTINUED | OUTPATIENT
Start: 2024-03-26 | End: 2024-03-26

## 2024-03-26 RX ORDER — PROPOFOL 10 MG/ML
INJECTION, EMULSION INTRAVENOUS AS NEEDED
Status: DISCONTINUED | OUTPATIENT
Start: 2024-03-26 | End: 2024-03-26

## 2024-03-26 RX ORDER — OXYCODONE HYDROCHLORIDE 5 MG/1
5 TABLET ORAL EVERY 4 HOURS PRN
Status: DISCONTINUED | OUTPATIENT
Start: 2024-03-26 | End: 2024-03-26

## 2024-03-26 RX ORDER — FENTANYL CITRATE/PF 50 MCG/ML
25 SYRINGE (ML) INJECTION
Status: DISCONTINUED | OUTPATIENT
Start: 2024-03-26 | End: 2024-03-26 | Stop reason: HOSPADM

## 2024-03-26 RX ORDER — BUPIVACAINE HYDROCHLORIDE 5 MG/ML
INJECTION, SOLUTION EPIDURAL; INTRACAUDAL AS NEEDED
Status: DISCONTINUED | OUTPATIENT
Start: 2024-03-26 | End: 2024-03-26 | Stop reason: HOSPADM

## 2024-03-26 RX ORDER — CEFAZOLIN SODIUM 2 G/50ML
2000 SOLUTION INTRAVENOUS ONCE
Status: COMPLETED | OUTPATIENT
Start: 2024-03-26 | End: 2024-03-26

## 2024-03-26 RX ORDER — LIDOCAINE HYDROCHLORIDE 10 MG/ML
INJECTION, SOLUTION EPIDURAL; INFILTRATION; INTRACAUDAL; PERINEURAL AS NEEDED
Status: DISCONTINUED | OUTPATIENT
Start: 2024-03-26 | End: 2024-03-26

## 2024-03-26 RX ORDER — FENTANYL CITRATE 50 UG/ML
INJECTION, SOLUTION INTRAMUSCULAR; INTRAVENOUS AS NEEDED
Status: DISCONTINUED | OUTPATIENT
Start: 2024-03-26 | End: 2024-03-26

## 2024-03-26 RX ORDER — SODIUM CHLORIDE, SODIUM LACTATE, POTASSIUM CHLORIDE, CALCIUM CHLORIDE 600; 310; 30; 20 MG/100ML; MG/100ML; MG/100ML; MG/100ML
100 INJECTION, SOLUTION INTRAVENOUS CONTINUOUS
OUTPATIENT
Start: 2024-03-26

## 2024-03-26 RX ADMIN — ROCURONIUM BROMIDE 50 MG: 10 INJECTION, SOLUTION INTRAVENOUS at 07:31

## 2024-03-26 RX ADMIN — SODIUM CHLORIDE, SODIUM LACTATE, POTASSIUM CHLORIDE, AND CALCIUM CHLORIDE: .6; .31; .03; .02 INJECTION, SOLUTION INTRAVENOUS at 06:47

## 2024-03-26 RX ADMIN — LIDOCAINE HYDROCHLORIDE 5 ML: 10 INJECTION, SOLUTION EPIDURAL; INFILTRATION; INTRACAUDAL; PERINEURAL at 07:31

## 2024-03-26 RX ADMIN — SUGAMMADEX 200 MG: 100 INJECTION, SOLUTION INTRAVENOUS at 09:22

## 2024-03-26 RX ADMIN — SODIUM CHLORIDE 250 ML: 0.9 INJECTION, SOLUTION INTRAVENOUS at 06:31

## 2024-03-26 RX ADMIN — FENTANYL CITRATE 50 MCG: 50 INJECTION INTRAMUSCULAR; INTRAVENOUS at 09:08

## 2024-03-26 RX ADMIN — ONDANSETRON 4 MG: 2 INJECTION INTRAMUSCULAR; INTRAVENOUS at 07:31

## 2024-03-26 RX ADMIN — DEXAMETHASONE SODIUM PHOSPHATE 10 MG: 10 INJECTION, SOLUTION INTRAMUSCULAR; INTRAVENOUS at 07:31

## 2024-03-26 RX ADMIN — PROPOFOL 180 MG: 10 INJECTION, EMULSION INTRAVENOUS at 07:31

## 2024-03-26 RX ADMIN — CEFAZOLIN SODIUM 2000 MG: 2 SOLUTION INTRAVENOUS at 07:38

## 2024-03-26 RX ADMIN — FENTANYL CITRATE 50 MCG: 50 INJECTION INTRAMUSCULAR; INTRAVENOUS at 07:31

## 2024-03-26 RX ADMIN — OXYCODONE HYDROCHLORIDE 5 MG: 5 TABLET ORAL at 10:25

## 2024-03-26 RX ADMIN — CHLORHEXIDINE GLUCONATE 0.12% ORAL RINSE 15 ML: 1.2 LIQUID ORAL at 06:24

## 2024-03-26 NOTE — OP NOTE
OPERATIVE REPORT  PATIENT NAME: Shaun Mohr    :  1950  MRN: [id number]  Pt Location:  OR ROOM 03    SURGERY DATE: 3/26/2024    Surgeons and Role:     * Bo Aguilar MD - Primary    Preop Diagnosis:  Lumbar stenosis with neurogenic claudication [M48.062]    Post-Op Diagnosis Codes:     * Lumbar stenosis with neurogenic claudication [M48.062]    Procedure(s):  Left sided approach for L3-4 MIS laminectomy  Use of intraoperative fluoro  Use of intraoperative microscope    Specimen(s):  * No specimens in log *    Estimated Blood Loss:   Minimal    Drains:  * No LDAs found *    Anesthesia Type:   General    Operative Indications:  Lumbar stenosis with neurogenic claudication [M48.062]    Complications:   None    Procedure and Technique:  Patient brought back to main operating room and general endotracheal esthesia was induced.  Appropriate lines were placed.  Preoperative antibiotics given were Ancef.  He is placed prone on the operating room table all pressure points were appropriately padded.  Lumbar area was prepped and draped in usual sterile fashion timeout was performed.  Intraoperative fluoroscopy was used to confirm the L3-4 area.  A 1.5 cm linear incision approximately 1 cm lateral to the left of midline was made using 10 blade scalpel dissection was carried down to the fascia using monopolar electrocautery.  Fascia was divided using monopolar electrocautery.  Blunt finger dissection was used to dissect the paraspinal muscles off of the L3-4 facet on the left side.  MIS tubes were inserted and dilated to 18 mm in width and secured to the table.  Intraoperative fluoroscopy was used to confirm the L3-4 level on the left side.  Monopolar electrocautery was used to dissect the remainder of the paraspinal muscle away from the L3 and L4 lamina and medial L3-4 facet.  M8 drill bit was used to complete the L3 laminectomy, L4 laminectomy, and the medial facetectomy at L3-4 on the left side.  Upgoing  curette was used to dissect the dura away from the underlying ligamentum flavum.  Kerrison rongeur was used to resect the ligamentum flavum bilaterally.  Upon initial resection of the ligamentum the dura was noted to be indented by the buckling of the ligamentum flavum and the dura was noted to be very flat at the end of the decompression.  Kerrison rongeur was used to complete the ligamentum flavum resection bilaterally as well as complete the laminectomy superiorly and inferiorly.  Blunt nerve hook was used to feel for the medial and lateral edges as well as the superior and inferior edges to ensure that the dura was well decompressed.  Floseal was used for hemostasis as well as thrombin-soaked cotton patties.  After I was happy with the decompression irrigation was used to washout the wound.  The tubes systems were removed and the fascia was closed with 0 Vicryl sutures, dermis was closed with 2-0 Vicryl sutures, and the skin was closed with 4 oh strata fix.  After procedure was done a counts performed and all sponge instrument needle counts verified to be correct.  Patient was transported to PACU in stable condition.      I was present for the entirety of the case.     Patient Disposition:  PACU         SIGNATURE: Bo Aguilar MD  DATE: March 26, 2024  TIME: 9:51 AM

## 2024-03-26 NOTE — ANESTHESIA POSTPROCEDURE EVALUATION
Post-Op Assessment Note    CV Status:  Stable  Pain Score: 0    Pain management: adequate       Mental Status:  Alert and awake   Hydration Status:  Euvolemic   PONV Controlled:  Controlled   Airway Patency:  Patent     Post Op Vitals Reviewed: Yes    No anethesia notable event occurred.    Staff: Anesthesiologist, CRNA               BP   145/75   Temp   98   Pulse  87   Resp   18   SpO2   94

## 2024-03-26 NOTE — H&P
"H&P reviewed. After examining the patient I find no changes in the patients condition since the H&P had been written.    Patient personally seen and examined.  Neurological examination unchanged compared to last office/progress note, with the following exceptions:    /85   Pulse 62   Temp 97.5 °F (36.4 °C) (Temporal)   Resp 16   Ht 5' 8\" (1.727 m)   Wt 68.5 kg (151 lb 0.2 oz)   SpO2 97%   BMI 22.96 kg/m²      A&OX3  Gaze conjugate  EOMI  FS  TM  Fcx4  5/5 BUE  5/5 BLE  SILT  No clonus  No enamorado  No babinski.  Regular cardiac rate and rhythm.  No respiratory distress.  Abdomen nontender.  Normocephalic.    Post operative instructions and medications have been reviewed with the patient and family.    Assessment and Plan:    All questions have been answered to the patient and family satisfaction.  Plan to proceed with left sided approach for L3-4 MIS laminectomy. They are in agreement with proceeding.  "

## 2024-03-26 NOTE — ANESTHESIA PREPROCEDURE EVALUATION
Procedure:  LEFT SIDED APPROACH L3-4 MIS LAMINECTOMY (Left: Spine Lumbar)    Relevant Problems   ANESTHESIA (within normal limits)      CARDIO   (+) Benign essential hypertension   (+) Hyperlipidemia      PULMONARY (within normal limits)   (-) Sleep apnea   (-) Smoking   (-) URI (upper respiratory infection)      Ear/Nose/Throat   (+) Allergic rhinitis      Rheumatology   (+) Lumbar disc herniation with radiculopathy      Other   (+) Prediabetes      Physical Exam    Airway    Mallampati score: II  TM Distance: >3 FB  Neck ROM: full     Dental   No notable dental hx     Cardiovascular      Pulmonary      Other Findings      Lab Results   Component Value Date    WBC 4.71 03/12/2024    HGB 14.5 03/12/2024     03/12/2024     Lab Results   Component Value Date    SODIUM 141 03/12/2024    K 4.7 03/12/2024    BUN 27 (H) 03/12/2024    CREATININE 1.22 03/12/2024    EGFR 58 03/12/2024     Lab Results   Component Value Date    PTT 38 (H) 03/12/2024      Lab Results   Component Value Date    INR 1.03 03/12/2024     Lab Results   Component Value Date    HGBA1C 6.4 (H) 03/12/2024     Anesthesia Plan  ASA Score- 2     Anesthesia Type- general with ASA Monitors.         Additional Monitors:     Airway Plan: ETT.           Plan Factors-Exercise tolerance (METS): >4 METS.    Chart reviewed.   Existing labs reviewed. Patient summary reviewed.    Patient is not a current smoker.              Induction- intravenous.    Postoperative Plan-     Informed Consent- Anesthetic plan and risks discussed with patient and spouse.  I personally reviewed this patient with the CRNA. Discussed and agreed on the Anesthesia Plan with the CRNA..

## 2024-03-27 ENCOUNTER — TELEPHONE (OUTPATIENT)
Dept: NEUROSURGERY | Facility: CLINIC | Age: 74
End: 2024-03-27

## 2024-03-27 NOTE — TELEPHONE ENCOUNTER
Called patient to see how he is doing after surgery. Patient reports he is doing well overall and denies any incisional issues or fevers. Patient is able to ambulate around the house and complete ADLs. Educated the patient about the importance of preventing blood clots and provided measures how to prevent them.     Patient has not moved his bowels since the surgery. Encouraged patient to take an over the counter stool softener, if he is taking narcotic pain medication. Encouraged fiber intake and fluids.    Reviewed incision care with the patient. Advised that after three days he may take a shower and gently wash the surgical site with soap and water. Use clean wash cloth, towels, and clothing. Do not submerge in water until cleared by the surgeon. Do not apply any creams, ointments, or lotions to the site.  Patient is aware to call the office if any redness, swelling, drainage, dehiscence of incision, or fever >100 F occurs.    Patient is aware to call the office if any concerns or questions may arise. Reminded patient of his upcoming appointments with the date/time/location. Patient was appreciative for the call.

## 2024-03-28 ENCOUNTER — TELEPHONE (OUTPATIENT)
Dept: NEUROSURGERY | Facility: CLINIC | Age: 74
End: 2024-03-28

## 2024-03-28 NOTE — TELEPHONE ENCOUNTER
3/28/24 - PT DISCHARGED TO HOME. SEE ISMA TELEPHONE NOTE.  4/9/24 2 WK POV W/NURSE  5/13/24 6-7 WK POV W/BERTO

## 2024-04-03 ENCOUNTER — CONSULT (OUTPATIENT)
Dept: CARDIOLOGY CLINIC | Facility: CLINIC | Age: 74
End: 2024-04-03
Payer: MEDICARE

## 2024-04-03 VITALS
WEIGHT: 155.2 LBS | BODY MASS INDEX: 23.52 KG/M2 | HEART RATE: 80 BPM | OXYGEN SATURATION: 97 % | TEMPERATURE: 97.5 F | DIASTOLIC BLOOD PRESSURE: 76 MMHG | HEIGHT: 68 IN | SYSTOLIC BLOOD PRESSURE: 128 MMHG

## 2024-04-03 DIAGNOSIS — I49.3 PVC'S (PREMATURE VENTRICULAR CONTRACTIONS): Primary | ICD-10-CM

## 2024-04-03 DIAGNOSIS — I49.1 PAC (PREMATURE ATRIAL CONTRACTION): ICD-10-CM

## 2024-04-03 DIAGNOSIS — Z82.49 FAMILY HISTORY OF HEART ATTACK: ICD-10-CM

## 2024-04-03 DIAGNOSIS — I10 BENIGN ESSENTIAL HYPERTENSION: ICD-10-CM

## 2024-04-03 PROCEDURE — 99204 OFFICE O/P NEW MOD 45 MIN: CPT | Performed by: INTERNAL MEDICINE

## 2024-04-03 NOTE — PROGRESS NOTES
[facility] [place] [id number] [facility] [id number] [phone]                                            Cardiology Office Consult  Shaun Mohr, 74 y.o. male  YOB: 1950  MRN: [id number] Encounter: [id number]      PCP - Jenny Lang DO  Referring Provider - Leona Hillman MD    Chief Complaint   Patient presents with   • Consult       Assessment  Frequent PVCs  PACs  Family history of heart attack  Father  of heart attack at 52 - did not take care of himself  GERD    Plan  Frequent PVCs, PACs  Overview  ECG from 3/13/2023 personally reviewed and interpreted today  Normal sinus rhythm, PACs and PVCs  No acute ST-T changes  Clinically, he continues to have ectopic beats today during my exam  He does not seem to have any palpitations associated with it though and is mostly asymptomatic  Risk factors  Caffeine - daily 1-2 cups  Alcohol - daily 1-2 glasses of wine  Plan  Check 48-hour Holter monitor  Already ordered --> will get it scheduled  If frequent ectopic PVCs, then consider stress testing to risk stratify for stress induced arrhythmia and CAD  Limit caffeine, alcohol intake  Maintain good hydration  Increase cardio exercises  Add exercise bike for 20 minutes daily to the dog walk    Family history of heart attack / sudden death  His father apparently  at the age of 52 for heart attack, while he was at a racetrack  No known CAD/stents/heart surgery before or after  He had a stress test many years ago in [facility], which was apparently normal  No current symptoms to suggest CAD  Monitor  If frequent PVCs on holter, then we will consider repeating stress testing to risk stratify    No results found for this visit on 24.    No orders of the defined types were placed in this encounter.    Return in about 6 weeks (around 5/15/2024), or if symptoms worsen or fail to improve.      History of Present Illness   74 y.o. male , who previously worked as  the asbestos removal coordinator, comes in as a new patient for consultation regarding abnormalities detected on ECG done during recent preop evaluation.    He reports no active complaints of chest pain, shortness of breath, palpitations or dizziness.  No known prior history of coronary artery disease or heart failure.  He has had ongoing issues with leg pain and was found to have back issues, and needed spinal surgery for this.  He went through preop ECG through his PCP for this and was found to have ectopic beats on this.  He subsequently had an echocardiogram done and cardiology referral was given for preop evaluation.  Subsequently the echocardiogram was within normal limits and as a result decision was made to proceed with surgery.  He did not have any perioperative cardiac complications and is recovering well thereafter.  He is here today for routine consultation regarding these ectopic beats    He currently walks regularly with his dog doing about 1.5 to 3 miles, but admits that his dog stops a lot and goes quite slowly.    Family history  Father-  of heart attack at 52, while at a race track.   Mother -  of DM2 at 89. No known CAD   Siblings - youngest of 4 siblings  Brother - stroke in 70s. No known heart problems  No known CAD in siblings    Historical Information   Past Medical History:   Diagnosis Date   • Hernia, inguinal, right 2021   • Hypertension    • Irregular heart beat    • Lung nodule      Past Surgical History:   Procedure Laterality Date   • BUNIONECTOMY Right    • COLONOSCOPY  2018   • HAND SURGERY Right     cyst excision   • MI THOMAS FACETECTOMY & FORAMOTOMY 1 VRT SGM LUMBAR Left 3/26/2024    Procedure: LEFT SIDED APPROACH L3-4 MIS LAMINECTOMY;  Surgeon: Bo Aguilra MD;  Location: [facility];  Service: Neurosurgery   • MI RPR 1ST INGUN HRNA AGE 5 YRS/> REDUCIBLE Right 03/15/2021    Procedure: INGUINAL HERNIA REPAIR;  Surgeon: Shaun Pineda DO;  Location: [facility];   Service: General   • SHOULDER SURGERY Right 2018     Family History   Problem Relation Age of Onset   • Diabetes Mother    • Heart attack Father      Current Outpatient Medications   Medication Instructions   • amLODIPine (NORVASC) 2.5 mg, Oral, Daily   • aspirin 81 mg, Daily at bedtime   • Cholecalciferol (Vitamin D-3) 25 MCG (1000 UT) CAPS 1 capsule, Oral, Daily   • Diclofenac Sodium (VOLTAREN) 2 g, Topical, As needed   • finasteride (PROSCAR) 5 mg tablet TAKE 1/2 TABLET(2.5 MG) BY MOUTH DAILY AT BEDTIME   • gabapentin (NEURONTIN) 300 mg, Oral, 3 times daily   • Multiple Vitamin (MULTIVITAMIN ADULT PO) 1 tablet, Oral, Daily   • oxyCODONE (ROXICODONE) 5 mg, Oral, Every 4 hours PRN   • pantoprazole (PROTONIX) 20 mg, Oral, Daily before breakfast   • rosuvastatin (CRESTOR) 20 mg, Oral, Daily   • Turmeric 400 MG CAPS 1 capsule, Oral, Daily   • vitamin C 1,000 mg, Oral, Daily      No Known Allergies  Social History     Socioeconomic History   • Marital status:      Spouse name: Not on file   • Number of children: Not on file   • Years of education: Not on file   • Highest education level: Not on file   Occupational History   • Not on file   Tobacco Use   • Smoking status: Former     Current packs/day: 0.00     Average packs/day: 1 pack/day for 10.0 years (10.0 ttl pk-yrs)     Types: Cigarettes     Start date:      Quit date:      Years since quittin.2   • Smokeless tobacco: Never   Vaping Use   • Vaping status: Never Used   Substance and Sexual Activity   • Alcohol use: Yes     Alcohol/week: 10.0 standard drinks of alcohol     Types: 10 Glasses of wine per week     Comment: a glass of wine or two a night    • Drug use: Never   • Sexual activity: Yes     Partners: Female   Other Topics Concern   • Not on file   Social History Narrative    · Do you currently or have you served in the [employer]:   Yes      · If Yes, What branch of service:   [employer]      · Were you activated, into active duty,  "as a member of the [employer] or as a Reservist:   No      Social Determinants of Health     Financial Resource Strain: Low Risk  (8/22/2023)    Overall Financial Resource Strain (CARDIA)    • Difficulty of Paying Living Expenses: Not very hard   Food Insecurity: Not on file   Transportation Needs: No Transportation Needs (8/22/2023)    PRAPARE - Transportation    • Lack of Transportation (Medical): No    • Lack of Transportation (Non-Medical): No   Physical Activity: Not on file   Stress: Not on file   Social Connections: Not on file   Intimate Partner Violence: Not on file   Housing Stability: Not on file        Review of Systems   All other systems reviewed and are negative.        Vitals:  Vitals:    04/03/24 0947 04/03/24 0949   BP: 110/68 128/76   BP Location: Left arm Right arm   Patient Position: Sitting Sitting   Cuff Size: Adult Adult   Pulse: 80    Temp: 97.5 °F (36.4 °C)    SpO2: 97%    Weight: 70.4 kg (155 lb 3.2 oz)    Height: 5' 8\" (1.727 m)      BMI - Body mass index is 23.6 kg/m².  Wt Readings from Last 7 Encounters:   04/03/24 70.4 kg (155 lb 3.2 oz)   03/26/24 68.5 kg (151 lb 0.2 oz)   03/15/24 71.1 kg (156 lb 12 oz)   03/14/24 71.1 kg (156 lb 12.8 oz)   03/11/24 70.7 kg (155 lb 12.8 oz)   03/07/24 69.9 kg (154 lb)   02/15/24 69.9 kg (154 lb)       Physical Exam  Vitals and nursing note reviewed.   Constitutional:       General: He is not in acute distress.     Appearance: Normal appearance. He is well-developed and normal weight. He is not ill-appearing or diaphoretic.   HENT:      Head: Normocephalic and atraumatic.      Nose: No congestion.   Eyes:      General: No scleral icterus.     Conjunctiva/sclera: Conjunctivae normal.   Neck:      Vascular: No carotid bruit or JVD.   Cardiovascular:      Rate and Rhythm: Normal rate and regular rhythm. Frequent Extrasystoles are present.     Heart sounds: Normal heart sounds. No murmur heard.     No friction rub. No gallop.   Pulmonary:      Effort: " "Pulmonary effort is normal. No respiratory distress.      Breath sounds: Normal breath sounds. No wheezing or rales.   Chest:      Chest wall: No tenderness.   Abdominal:      General: There is no distension.      Palpations: Abdomen is soft.      Tenderness: There is no abdominal tenderness.   Musculoskeletal:         General: No swelling, tenderness or deformity.      Cervical back: Neck supple. No muscular tenderness.      Right lower leg: No edema.      Left lower leg: No edema.   Skin:     General: Skin is warm.   Neurological:      General: No focal deficit present.      Mental Status: He is alert and oriented to person, place, and time. Mental status is at baseline.   Psychiatric:         Mood and Affect: Mood normal.         Behavior: Behavior normal.         Thought Content: Thought content normal.           Labs:  CBC:   Lab Results   Component Value Date    WBC 4.71 03/12/2024    RBC 4.79 03/12/2024    HGB 14.5 03/12/2024    HCT 44.8 03/12/2024    MCV 94 03/12/2024     03/12/2024    RDW 14.2 03/12/2024       CMP:   Lab Results   Component Value Date    K 4.7 03/12/2024     03/12/2024    CO2 32 03/12/2024    BUN 27 (H) 03/12/2024    CREATININE 1.22 03/12/2024    EGFR 58 03/12/2024    CALCIUM 9.3 03/12/2024    AST 20 03/12/2024    ALT 16 03/12/2024    ALKPHOS 46 03/12/2024       Magnesium:  No results found for: \"MG\"    Lipid Profile:   Lab Results   Component Value Date    HDL 48 10/30/2023    TRIG 115 10/30/2023    LDLCALC 102 (H) 10/30/2023       Thyroid Studies: No results found for: \"RJA3XLISHXYL\", \"T3FREE\", \"FREET4\", \"J6POHNM\", \"Q1LYNTZ\"    A1c:  No components found for: \"HGA1C\"    INR:  Lab Results   Component Value Date    INR 1.03 03/12/2024   5    Cardiac testing:   No results found for this or any previous visit.    No results found for this or any previous visit.    No results found for this or any previous visit.    No results found for this or any previous visit.      XR spine " lumbar 2 or 3 views injury  Narrative: C-ARM - XR SPINE LUMBAR 2 OR 3 VIEWS INJURY    INDICATION: L3-L4 laminectomy. Procedure guidance.    TECHNIQUE: Fluoroscopic guidance provided.    COMPARISON: X-rays of the lumbar spine from March 7, 2024, MRI of the lumbar spine from February 15, 2024    FLUOROSCOPY TIME: 5.2-second    2 FLUOROSCOPIC IMAGES    FINDINGS:    Fluoroscopy provided for procedure guidance. Initial lateral intraoperative fluoroscopic image demonstrates a dorsally located cannula at the L3-4 disc level. The subsequent frontal radiograph demonstrates the cannulated surgical device projecting over   the left margin of the vertebral column, again projecting over the L3-4 disc space.    Osseous and soft tissue detail limited by technique.  Impression: Fluoroscopy provided for procedure guidance.    Please refer to the separate procedure note for additional details.    Workstation performed: [id number]

## 2024-04-09 ENCOUNTER — TELEMEDICINE (OUTPATIENT)
Dept: NEUROSURGERY | Facility: CLINIC | Age: 74
End: 2024-04-09

## 2024-04-09 DIAGNOSIS — Z98.890 POST-OPERATIVE STATE: Primary | ICD-10-CM

## 2024-04-09 NOTE — PROGRESS NOTES
Virtual Regular Visit    Verification of patient location:    Patient is located at Home in the following state in which I hold an active license [place]      Assessment/Plan:    Reason for visit is   Chief Complaint   Patient presents with    Virtual Regular Visit          Encounter provider Les Julian RN    Provider located at [facility] [place]  [address] [facility] [place] [address]  [phone]      Recent Visits  No visits were found meeting these conditions.  Showing recent visits within past 7 days and meeting all other requirements  Today's Visits  Date Type Provider Dept   04/09/24 Telemedicine Les Julian RN  [facility] [place]   Showing today's visits and meeting all other requirements  Future Appointments  No visits were found meeting these conditions.  Showing future appointments within next 150 days and meeting all other requirements       The patient was identified by name and date of birth. Shaun TOREY Mohr was informed that this is a telemedicine visit and that the visit is being conducted through Telephone.  My office door was closed. No one else was in the room.  He acknowledged consent and understanding of privacy and security of the video platform. The patient has agreed to participate and understands they can discontinue the visit at any time.        Past Medical History:   Diagnosis Date    Hernia, inguinal, right 02/24/2021    Hypertension     Irregular heart beat     Lung nodule        Past Surgical History:   Procedure Laterality Date    BUNIONECTOMY Right     COLONOSCOPY  2018    HAND SURGERY Right     cyst excision    OK THOMAS FACETECTOMY & FORAMOTOMY 1 VRT SGM LUMBAR Left 3/26/2024    Procedure: LEFT SIDED APPROACH L3-4 MIS LAMINECTOMY;  Surgeon: Bo Aguilar MD;  Location:  MAIN OR;  Service: Neurosurgery    OK RPR 1ST INGUN HRNA AGE 5 YRS/> REDUCIBLE Right 03/15/2021    Procedure: INGUINAL HERNIA REPAIR;  Surgeon: Shaun  DO Edwin;  Location: [facility];  Service: General    SHOULDER SURGERY Right 2018       Current Outpatient Medications   Medication Sig Dispense Refill    amLODIPine (NORVASC) 2.5 mg tablet TAKE 1 TABLET DAILY (Patient taking differently: Take 2.5 mg by mouth daily at bedtime) 90 tablet 1    Ascorbic Acid (vitamin C) 1000 MG tablet Take 1,000 mg by mouth daily      aspirin 81 mg chewable tablet Chew 81 mg daily at bedtime      Cholecalciferol (Vitamin D-3) 25 MCG (1000 UT) CAPS Take 1 capsule by mouth daily      Diclofenac Sodium (VOLTAREN) 1 % Apply 2 g topically if needed      finasteride (PROSCAR) 5 mg tablet TAKE 1/2 TABLET(2.5 MG) BY MOUTH DAILY AT BEDTIME (Patient taking differently: Take 2.5 mg by mouth daily at bedtime) 45 tablet 1    gabapentin (Neurontin) 300 mg capsule Take 1 capsule (300 mg total) by mouth 3 (three) times a day 90 capsule 2    Multiple Vitamin (MULTIVITAMIN ADULT PO) Take 1 tablet by mouth daily      pantoprazole (PROTONIX) 20 mg tablet Take 1 tablet (20 mg total) by mouth daily before breakfast 30 tablet 5    rosuvastatin (CRESTOR) 20 MG tablet Take 1 tablet (20 mg total) by mouth daily (Patient taking differently: Take 20 mg by mouth daily at bedtime) 180 tablet 1    Turmeric 400 MG CAPS Take 1 capsule by mouth daily       No current facility-administered medications for this visit.                                                                                    Post-Op Visit- Neurosurgery    Shaun Mohr 74 y.o. male MRN: [id number]    Chief Complaint:  Patient presents post: Left Sided Approach L3-4 Mis Laminectomy - Left    History of Present Illness:  Patient reports he is doing well overall and denies any incisional issues or fevers.  he denies any new weakness, numbness or tingling since the surgery. Patient admits to much improved surgical pain at this time and rates their pain as a  4/10.  Patient is currently taking tylenol and gabapentin with some relief of pain  symptoms.      Assessment:   There were no vitals filed for this visit.    Wound Exam: Incision well approximated.  No erythema, edema or drainage present.   Location: lumbar spine.  See image below                 Discussion/Summary:  Doing well postoperatively. Reviewed incision care with patient including daily observation for s/s infection including: increased erythema, edema, drainage, dehiscence of incision or fever >101.  Should these be observed, he understands that he is to call and/or return immediately for reassessment.  Advised patient to continue cleansing area with mild soap and water and pat dry. Not to apply any lotions, creams, or ointments, & not to submerge in any water for 4 more weeks.     He is to maintain activity restrictions until cleared by the surgeon. Activity levels were also reviewed with the patient in detail, he is to lift no greater than 10 pounds and ambulation is encouraged as tolerated.      Verified date/time/location of upcoming POV. He is to call the office with any further questions or concerns, or if any incisional issues or fevers would arise.

## 2024-04-10 ENCOUNTER — HOSPITAL ENCOUNTER (OUTPATIENT)
Dept: NON INVASIVE DIAGNOSTICS | Facility: HOSPITAL | Age: 74
Discharge: HOME | End: 2024-04-10
Attending: STUDENT IN AN ORGANIZED HEALTH CARE EDUCATION/TRAINING PROGRAM
Payer: MEDICARE

## 2024-04-10 DIAGNOSIS — I49.3 PVC'S (PREMATURE VENTRICULAR CONTRACTIONS): ICD-10-CM

## 2024-04-10 PROCEDURE — 93225 XTRNL ECG REC<48 HRS REC: CPT

## 2024-04-10 PROCEDURE — 93226 XTRNL ECG REC<48 HR SCAN A/R: CPT

## 2024-05-13 ENCOUNTER — OFFICE VISIT (OUTPATIENT)
Dept: NEUROSURGERY | Facility: CLINIC | Age: 74
End: 2024-05-13

## 2024-05-13 VITALS
BODY MASS INDEX: 23.49 KG/M2 | TEMPERATURE: 98 F | HEIGHT: 68 IN | OXYGEN SATURATION: 97 % | WEIGHT: 155 LBS | HEART RATE: 65 BPM | SYSTOLIC BLOOD PRESSURE: 136 MMHG | RESPIRATION RATE: 18 BRPM | DIASTOLIC BLOOD PRESSURE: 82 MMHG

## 2024-05-13 DIAGNOSIS — M48.062 LUMBAR STENOSIS WITH NEUROGENIC CLAUDICATION: Primary | ICD-10-CM

## 2024-05-13 PROCEDURE — 99024 POSTOP FOLLOW-UP VISIT: CPT | Performed by: STUDENT IN AN ORGANIZED HEALTH CARE EDUCATION/TRAINING PROGRAM

## 2024-05-13 NOTE — PROGRESS NOTES
Assessment/Plan:  74M with hx of LBP and primarily LLE pain who is s/p L3-4 mis laminectomy 3/26/24.  He presents today for 6-week postop visit.  Overall he is doing well.  His leg pain has improved postoperatively.  His incisions clean dry and intact.  I reviewed the preoperative imaging again with the patient.  He still continues to get left lower extremity pain intermittently particularly with large amounts of activity throughout the day and is better with rest.  He did have severe foraminal stenosis on the left side at L3-4 along with the grade 1 L3-4 spondylolisthesis.  I reassured him that as he gets farther out from surgery the pain flareups should improve however a portion of the pain may impart be due to to the left L3-4 foraminal stenosis.  I stated that the MIS laminectomy only removes the back part of the ligamentum flavum and it cannot remove the entirety of the foraminal stenosis without performing a L3-4 facetectomy which would lead to instability and require a fusion.  Ultimately I discussed that in the future if the pain does not improve and if it is debilitating and interrupting his quality of life then he may end up needing an L3-4 TLIF and a left L3-4 facetectomy for further treatment.  However he is still in the acute postoperative period and his symptoms have improved postoperatively so I reassured him that it may take more time to see how he does.  Next follow-up is in 6 weeks with repeat x-ray lumbar spine flex ex to ensure no instability.      Subjective:      Patient ID: Shaun Mohr is a 74 y.o. male.    HPI    74M with hx of LBP and primarily LLE pain who is s/p L3-4 mis laminectomy 3/26/24.  He presents today for 6-week postop visit.  Overall he is doing well.  His leg pain has improved postoperatively.  His incisions clean dry and intact.  He states that he will still get left leg pain flareups particularly with activity and for long activity days but overall it is less than prior to  "surgery.  He is ambulating independently.    Review of Systems      Objective:      /82 (BP Location: Right arm, Patient Position: Sitting, Cuff Size: Standard)   Pulse 65   Temp 98 °F (36.7 °C) (Temporal)   Resp 18   Ht 5' 8\" (1.727 m)   Wt 70.3 kg (155 lb)   SpO2 97%   BMI 23.57 kg/m²          Physical Exam    A&OX3  Gaze conjugate  EOMI  FS  TM  Fcx4  5/5 BUE  5/5 BLE  SILT  Incision c/d/I, some bulging of incision but not soft, no pseudomeningocele palpated  "

## 2024-05-28 ENCOUNTER — OFFICE VISIT (OUTPATIENT)
Age: 74
End: 2024-05-28
Payer: MEDICARE

## 2024-05-28 VITALS
OXYGEN SATURATION: 98 % | DIASTOLIC BLOOD PRESSURE: 80 MMHG | WEIGHT: 153.8 LBS | HEART RATE: 63 BPM | HEIGHT: 68 IN | RESPIRATION RATE: 15 BRPM | SYSTOLIC BLOOD PRESSURE: 120 MMHG | BODY MASS INDEX: 23.31 KG/M2

## 2024-05-28 DIAGNOSIS — H66.91 RIGHT OTITIS MEDIA WITH SPONTANEOUS RUPTURE OF EARDRUM: Primary | ICD-10-CM

## 2024-05-28 DIAGNOSIS — H69.90 DYSFUNCTION OF EUSTACHIAN TUBE, UNSPECIFIED LATERALITY: ICD-10-CM

## 2024-05-28 DIAGNOSIS — H72.91 RIGHT OTITIS MEDIA WITH SPONTANEOUS RUPTURE OF EARDRUM: Primary | ICD-10-CM

## 2024-05-28 PROCEDURE — G2211 COMPLEX E/M VISIT ADD ON: HCPCS

## 2024-05-28 PROCEDURE — 99214 OFFICE O/P EST MOD 30 MIN: CPT

## 2024-05-28 RX ORDER — AMOXICILLIN AND CLAVULANATE POTASSIUM 875; 125 MG/1; MG/1
1 TABLET, FILM COATED ORAL EVERY 12 HOURS SCHEDULED
Qty: 20 TABLET | Refills: 0 | Status: SHIPPED | OUTPATIENT
Start: 2024-05-28 | End: 2024-06-07

## 2024-05-28 NOTE — PATIENT INSTRUCTIONS
Start taking augmentin 875/125 every 12 hours as scheduled for the next 10 days.  Please finish the entire course of antibiotics, even if you start to feel better before you finish the antibiotics.   Continue taking your allergy pill nightly at minimum while you are on the antibiotic.  It may be helpful to continue taking this for the foreseeable future to keep from having congestion built up in your ear.   Ruptured Eardrum   WHAT YOU NEED TO KNOW:   What is a ruptured eardrum?  A ruptured eardrum is a tear or hole in your eardrum.       What causes a ruptured eardrum?   Air pressure changes from a plane ride or scuba diving    Injury caused by objects put into your ear, such as cotton swabs    Infections of your middle ear    A head injury from contact sports, car accidents, and falls    Ear surgeries or procedures    What are the signs and symptoms of a ruptured eardrum?   Clear, thick, yellowish, or bloody ear discharge    Hearing loss    Ear pain    Ringing or buzzing in your ear    Dizziness    How is a ruptured eardrum diagnosed?  Your healthcare provider may use a tool called an otoscope to look inside your ear. An otoscope will allow your provider to see your eardrum and the size and location of the tear. Your provider will also be able to see any fluid or infection inside your ear.  How is a ruptured eardrum treated?  A mild rupture may heal on its own over time. Your healthcare provider may clean your ear and put a bandage over it. Your provider may also place a cotton ear plug in your ear to cover the tear.  Antibiotic ear drops  may be needed to treat or prevent an infection caused by bacteria.    Surgery  may be needed to repair your eardrum if the hole in your eardrum is large, or does not heal. You may need any of the following:    Myringoplasty  is surgery that uses a graft to cover your torn eardrum. A graft may be may be tissue taken from your own body or it may be artificial. A procedure called a  mastoidectomy may also be done with a myringoplasty. A mastoidectomy is removal of infected bone from behind your ear.    Tympanoplasty  is surgery to repair your torn eardrum and any damage to your inner ear. The hole in your eardrum will be covered with a tissue graft. You may also need to have a mastoidectomy with your tympanoplasty surgery.       How can I care for my ruptured eardrum?   Always keep your ear dry.  Do not let your ear get wet, such as when bathing or swimming. Water may cause your damaged eardrum to heal more slowly and increase your risk for infection.    Do not put anything in your ear.  Never put objects such as cotton swabs in your ear. Pointed objects may damage or worsen the damage to your eardrum.    Try not to blow your nose.  The increase in pressure may cause further damage to your eardrum.    When should I seek immediate care?   You are bleeding from your ear.    You cannot move or feel areas of your face.    When should I call my doctor?   You have a fever.    Your hearing loss gets worse.    You feel increased dizziness, or you are vomiting.    You have worsening ear pain or a new buzzing sound in your ear.    Your symptoms do not improve, even after you take your medicine.    You have questions or concerns about your condition or care.    CARE AGREEMENT:   You have the right to help plan your care. Learn about your health condition and how it may be treated. Discuss treatment options with your healthcare providers to decide what care you want to receive. You always have the right to refuse treatment. The above information is an  only. It is not intended as medical advice for individual conditions or treatments. Talk to your doctor, nurse or pharmacist before following any medical regimen to see if it is safe and effective for you.  © Copyright [employer] 2023 Information is for End User's use only and may not be sold, redistributed or otherwise used for commercial  purposes.

## 2024-05-28 NOTE — PROGRESS NOTES
Ambulatory Visit  Name: Shaun Mohr      : 1950      MRN: [id number]  Encounter Provider: Caty Gurrola DO  Encounter Date: 2024   Encounter department: [facility]    Assessment & Plan   1. Right otitis media with spontaneous rupture of eardrum  -     amoxicillin-clavulanate (AUGMENTIN) 875-125 mg per tablet; Take 1 tablet by mouth every 12 (twelve) hours for 10 days  -     Ambulatory Referral to Otolaryngology; Future  2. Dysfunction of Eustachian tube, unspecified laterality       Shaun presents to clinic today with recent concern of sensation of water in his left ear followed by bloody appearance.  On exam, he has perforation of right eardrum with cain bloody discharge present.  Concern as well for infection given erythema of ear canal.  Based on his symptom history described to me, I am concerned that he may have perforated his eardrum a month ago and re-perforated yesterday due to forceful nose blowing in the setting of congestion. He has known chronic history of eustachian tube dysfunction and prior right eardrum rupture.  The following recommendations were given to Shaun today:  Start taking augmentin 875/125 BID x10d.  Finish the entire course of antibiotics.  Continue taking allergy medications daily while on the antibiotic.  Avoid putting foreign objects in the ear (qtips, eardrops) and avoid getting water in the ear.  Monitor for signs of systemic infection and present for additional evaluation if you develop ear pain, systemic fevers/chills.  Follow up with our office in 1.5-2 weeks to have your ear re-examined.  Discussed with Shaun today that he may also benefit from follow-up with otolaryngology provider, especially given that he has known history of traumatic rupture of right eardrum and appears to have had re-rupture with minimal trauma to the eardrum.  At this time, I would recommend he continue to take daily allergy medicine for the  "foreseeable future in order to decrease pressure buildup behind the eardrum due to congestion and potential re-perforation in the future.  He may ultimately require myringoplasty or tympanoplasty by ENT if this continues to be a recurring problem, which I also discussed with him today.  Will plan to complete antibiotic course and follow up with ENT to establish care and discuss longstanding concerns.    Return in about 10 years (around 5/28/2034) for Recheck right eardrum rupture.       History of Present Illness     Shaun presents to clinic for concerns of sensation of fullness in his right ear with associated bleeding from the ear.  He states around a month ago he felt a sensation of water in his ear with associated significant pain.  He applied ofloxacin ear drops (previously on hand from ear infection/urgent care visit) and went to sleep after putting a cotton ball in his ear, then noted blood on the cotton ball when he woke up.  He put more eardrops in, used a qtip which showed fresh blood, and then put a cotton ball back into his ear.  The bleeding ultimately resolvhowd.  He was not evaluated at the time of this series of events.     Now, he reports similar symptoms began to recur yesterday with feeling as though he had water/fullness of the right ear.  He did not have any associated pain with this episode.  Again yesterday he placed ofloxacin drops into his ear canal and developed bleeding of the right ear.  He denies associated fever or chills, or notable change in hearing over the past 24 hours.     He reports he previously had tympanostomy tubes in his ears many years ago, however this \"made things worse\" for him.  He takes loratadine as needed for congestion.  He reports prior history of right eardrum rupture with air travel many years ago.    Ear Fullness   There is pain in the right ear. This is a recurrent problem. The current episode started 1 to 4 weeks ago (most recent episode onset yesterday). " The problem has been waxing and waning. There has been no fever. The patient is experiencing no pain. Associated symptoms include ear discharge (bloody). Pertinent negatives include no rhinorrhea or sore throat. He has tried ear drops for the symptoms. The treatment provided no relief. His past medical history is significant for hearing loss and a tympanostomy tube.     Review of Systems   Constitutional:  Negative for chills and fever.   HENT:  Positive for ear discharge (bloody) and sinus pressure (mild, now resolved). Negative for ear pain, nosebleeds, rhinorrhea, sinus pain, sore throat and tinnitus.    All other systems reviewed and are negative.    Past Medical History:   Diagnosis Date    Hernia, inguinal, right 2021    Hypertension     Irregular heart beat     Lung nodule      Past Surgical History:   Procedure Laterality Date    BUNIONECTOMY Right     COLONOSCOPY  2018    HAND SURGERY Right     cyst excision    IN THOMAS FACETECTOMY & FORAMOTOMY 1 VRT SGM LUMBAR Left 3/26/2024    Procedure: LEFT SIDED APPROACH L3-4 MIS LAMINECTOMY;  Surgeon: Bo Aguilar MD;  Location: [facility];  Service: Neurosurgery    IN RPR 1ST INGUN HRNA AGE 6 YRS/> REDUCIBLE Right 03/15/2021    Procedure: INGUINAL HERNIA REPAIR;  Surgeon: Shaun Pineda DO;  Location: [facility];  Service: General    SHOULDER SURGERY Right 2018     Family History   Problem Relation Age of Onset    Diabetes Mother     Heart attack Father      Social History     Tobacco Use    Smoking status: Former     Current packs/day: 0.00     Average packs/day: 1 pack/day for 10.0 years (10.0 ttl pk-yrs)     Types: Cigarettes     Start date:      Quit date:      Years since quittin.4    Smokeless tobacco: Never   Vaping Use    Vaping status: Never Used   Substance and Sexual Activity    Alcohol use: Yes     Alcohol/week: 10.0 standard drinks of alcohol     Types: 10 Glasses of wine per week     Comment: a glass of wine or two a night     Drug  use: Never    Sexual activity: Yes     Partners: Female     Current Outpatient Medications on File Prior to Visit   Medication Sig    amLODIPine (NORVASC) 2.5 mg tablet TAKE 1 TABLET DAILY (Patient taking differently: Take 2.5 mg by mouth daily at bedtime)    Ascorbic Acid (vitamin C) 1000 MG tablet Take 1,000 mg by mouth daily    aspirin 81 mg chewable tablet Chew 81 mg daily at bedtime    Cholecalciferol (Vitamin D-3) 25 MCG (1000 UT) CAPS Take 1 capsule by mouth daily    Diclofenac Sodium (VOLTAREN) 1 % Apply 2 g topically if needed    diclofenac sodium (VOLTAREN) 50 mg EC tablet Take 50 mg by mouth 2 (two) times a day    finasteride (PROSCAR) 5 mg tablet TAKE 1/2 TABLET(2.5 MG) BY MOUTH DAILY AT BEDTIME (Patient taking differently: Take 2.5 mg by mouth daily at bedtime)    Multiple Vitamin (MULTIVITAMIN ADULT PO) Take 1 tablet by mouth daily    pantoprazole (PROTONIX) 20 mg tablet Take 1 tablet (20 mg total) by mouth daily before breakfast    rosuvastatin (CRESTOR) 20 MG tablet Take 1 tablet (20 mg total) by mouth daily (Patient taking differently: Take 20 mg by mouth daily at bedtime)    Turmeric 400 MG CAPS Take 1 capsule by mouth daily    gabapentin (Neurontin) 300 mg capsule Take 1 capsule (300 mg total) by mouth 3 (three) times a day     No Known Allergies  Immunization History   Administered Date(s) Administered    COVID-19 PFIZER VACCINE 0.3 ML IM 03/07/2021, 03/28/2021, 11/06/2021    DTP 03/08/2012    Hep A, adult 04/01/2000, 10/13/2001    INFLUENZA 03/08/2012, 09/15/2013, 10/20/2014, 11/20/2015, 10/01/2016, 10/08/2021, 10/06/2022    Influenza Split High Dose Preservative Free IM 11/02/2017    Influenza Whole 12/11/1999, 12/13/2000, 11/04/2001, 10/09/2002, 10/18/2003    Influenza, high dose seasonal 0.7 mL 10/06/2022, 08/31/2023    OPV 12/01/1970    Pneumococcal Conjugate 13-Valent 07/14/2016    Pneumococcal Polysaccharide PPV23 03/08/2012, 11/02/2017    Td (adult), Unspecified 03/08/2012    Td (adult),  "adsorbed 12/11/1999    Typhoid, Unspecified 02/05/2000, 04/01/2000, 02/01/2003    Yellow Fever 10/13/2001    Zoster 04/09/2012    Zoster Vaccine Recombinant 06/16/2021, 12/17/2021    influenza, trivalent, adjuvanted 11/06/2018     Objective     /80   Pulse 63   Resp 15   Ht 5' 8\" (1.727 m)   Wt 69.8 kg (153 lb 12.8 oz)   SpO2 98%   BMI 23.39 kg/m²     Physical Exam  Vitals reviewed.   Constitutional:       General: He is not in acute distress.     Appearance: Normal appearance. He is not ill-appearing or toxic-appearing.   HENT:      Right Ear: External ear normal. Decreased hearing (chronic) noted. Drainage (cain blood) present. No tenderness. No foreign body. Tympanic membrane is perforated.      Left Ear: Tympanic membrane, ear canal and external ear normal.      Ears:      Comments: Right ear canal is erythematous. Right TM is ruptured. Cain dark red blood is noted in the right ear canal.     Nose: Nose normal.      Mouth/Throat:      Mouth: Mucous membranes are moist.      Pharynx: Oropharynx is clear.   Eyes:      Conjunctiva/sclera: Conjunctivae normal.      Pupils: Pupils are equal, round, and reactive to light.   Cardiovascular:      Rate and Rhythm: Normal rate.   Skin:     General: Skin is warm and dry.   Neurological:      Mental Status: He is alert. Mental status is at baseline.   Psychiatric:         Mood and Affect: Mood normal.         Behavior: Behavior normal.       "

## 2024-06-06 DIAGNOSIS — N40.0 BENIGN PROSTATIC HYPERPLASIA, UNSPECIFIED WHETHER LOWER URINARY TRACT SYMPTOMS PRESENT: ICD-10-CM

## 2024-06-06 RX ORDER — FINASTERIDE 5 MG/1
TABLET, FILM COATED ORAL
Qty: 45 TABLET | Refills: 1 | Status: SHIPPED | OUTPATIENT
Start: 2024-06-06

## 2024-06-12 ENCOUNTER — OFFICE VISIT (OUTPATIENT)
Age: 74
End: 2024-06-12
Payer: MEDICARE

## 2024-06-12 VITALS
BODY MASS INDEX: 23.19 KG/M2 | SYSTOLIC BLOOD PRESSURE: 118 MMHG | HEART RATE: 70 BPM | WEIGHT: 153 LBS | TEMPERATURE: 97.6 F | OXYGEN SATURATION: 97 % | RESPIRATION RATE: 18 BRPM | HEIGHT: 68 IN | DIASTOLIC BLOOD PRESSURE: 70 MMHG

## 2024-06-12 DIAGNOSIS — H66.91 RIGHT OTITIS MEDIA WITH SPONTANEOUS RUPTURE OF EARDRUM: Primary | ICD-10-CM

## 2024-06-12 DIAGNOSIS — H69.90 DYSFUNCTION OF EUSTACHIAN TUBE, UNSPECIFIED LATERALITY: ICD-10-CM

## 2024-06-12 DIAGNOSIS — H72.91 RIGHT OTITIS MEDIA WITH SPONTANEOUS RUPTURE OF EARDRUM: Primary | ICD-10-CM

## 2024-06-12 PROCEDURE — G2211 COMPLEX E/M VISIT ADD ON: HCPCS

## 2024-06-12 PROCEDURE — 99213 OFFICE O/P EST LOW 20 MIN: CPT

## 2024-06-12 RX ORDER — AMOXICILLIN AND CLAVULANATE POTASSIUM 875; 125 MG/1; MG/1
1 TABLET, FILM COATED ORAL EVERY 12 HOURS SCHEDULED
Qty: 14 TABLET | Refills: 0 | Status: SHIPPED | OUTPATIENT
Start: 2024-06-12 | End: 2024-06-19

## 2024-06-12 RX ORDER — AMOXICILLIN AND CLAVULANATE POTASSIUM 875; 125 MG/1; MG/1
TABLET, FILM COATED ORAL
COMMUNITY
Start: 2024-05-28

## 2024-06-12 NOTE — PROGRESS NOTES
Ambulatory Visit  Name: Shaun Mohr      : 1950      MRN: [id number]  Encounter Provider: Jenny Lang DO  Encounter Date: 2024   Encounter department: [facility]    Assessment & Plan   1. Right otitis media with spontaneous rupture of eardrum  -     amoxicillin-clavulanate (AUGMENTIN) 875-125 mg per tablet; Take 1 tablet by mouth every 12 (twelve) hours for 7 days  -     Ambulatory Referral to Otolaryngology; Future  2. Dysfunction of Eustachian tube, unspecified laterality  -     amoxicillin-clavulanate (AUGMENTIN) 875-125 mg per tablet; Take 1 tablet by mouth every 12 (twelve) hours for 7 days  -     Ambulatory Referral to Otolaryngology; Future    Right ear had some erythema and dried blood in the ear canal. The eardrum has some scarring. Ordered 7 day course of augmentin 875/125 bid for prevention of complications given ear drum is ruptured. Provided referral to ENT specialist. Advised the patient to use ear plugs while showering and being submerged under water. Advised the patient to not stick anything in his ears. Recommended that he continues to take his allergy medication. Patient can follow up as needed. Gave precautions to return if bleeding returns, he has a reduction in hearing, or any ear pain.        History of Present Illness     HPI    The patient was seen on 24 for right otitis media with spontaneous rupture of eardrum and dysfunction of eustachian tube. The patient was prescribed augmentin 875/125 bid x10 days and was recommended to take a daily allergy medication while on the antibiotic. The patient completed the antibiotics without any side effects. He reports that this occurred a little over a month ago with an episode of painless bleeding from his right ear. This occurred again several weeks ago which prompted him to come to the office. Currently, the patient does not have any ear pain. He reports a dullness to his hearing which has been  there for the past 50 years when he shattered his ear drum at that time. He denies fever, chills, ringing in his ears, or sore throat.       Review of Systems   Constitutional:  Negative for chills and fever.   HENT:  Negative for ear discharge, ear pain, sinus pain and sore throat.    Eyes:  Negative for pain and visual disturbance.   Respiratory:  Negative for cough and shortness of breath.    Cardiovascular:  Negative for chest pain and palpitations.   Gastrointestinal:  Negative for abdominal pain and vomiting.   Genitourinary:  Negative for dysuria and hematuria.   Musculoskeletal:  Negative for arthralgias and back pain.   Skin:  Negative for color change and rash.   Neurological:  Negative for seizures and syncope.   All other systems reviewed and are negative.    Pertinent Medical History       Current Outpatient Medications on File Prior to Visit   Medication Sig Dispense Refill    amLODIPine (NORVASC) 2.5 mg tablet TAKE 1 TABLET DAILY (Patient taking differently: Take 2.5 mg by mouth daily at bedtime) 90 tablet 1    Ascorbic Acid (vitamin C) 1000 MG tablet Take 1,000 mg by mouth daily      aspirin 81 mg chewable tablet Chew 81 mg daily at bedtime      Cholecalciferol (Vitamin D-3) 25 MCG (1000 UT) CAPS Take 1 capsule by mouth daily      Diclofenac Sodium (VOLTAREN) 1 % Apply 2 g topically if needed      diclofenac sodium (VOLTAREN) 50 mg EC tablet Take 50 mg by mouth 2 (two) times a day      finasteride (PROSCAR) 5 mg tablet TAKE 1/2 TABLET(2.5 MG) BY MOUTH DAILY AT BEDTIME 45 tablet 1    gabapentin (Neurontin) 300 mg capsule Take 1 capsule (300 mg total) by mouth 3 (three) times a day 90 capsule 2    Multiple Vitamin (MULTIVITAMIN ADULT PO) Take 1 tablet by mouth daily      pantoprazole (PROTONIX) 20 mg tablet Take 1 tablet (20 mg total) by mouth daily before breakfast 30 tablet 5    rosuvastatin (CRESTOR) 20 MG tablet Take 1 tablet (20 mg total) by mouth daily (Patient taking differently: Take 20 mg by  "mouth daily at bedtime) 180 tablet 1    Turmeric 400 MG CAPS Take 1 capsule by mouth daily      amoxicillin-clavulanate (AUGMENTIN) 875-125 mg per tablet  (Patient not taking: Reported on 2024)       No current facility-administered medications on file prior to visit.      Social History     Tobacco Use    Smoking status: Former     Current packs/day: 0.00     Average packs/day: 1 pack/day for 10.0 years (10.0 ttl pk-yrs)     Types: Cigarettes     Start date:      Quit date:      Years since quittin.4    Smokeless tobacco: Never   Vaping Use    Vaping status: Never Used   Substance and Sexual Activity    Alcohol use: Yes     Alcohol/week: 10.0 standard drinks of alcohol     Types: 10 Glasses of wine per week     Comment: a glass of wine or two a night     Drug use: Never    Sexual activity: Yes     Partners: Female     Objective     /70   Pulse 70   Temp 97.6 °F (36.4 °C)   Resp 18   Ht 5' 8\" (1.727 m)   Wt 69.4 kg (153 lb)   SpO2 97%   BMI 23.26 kg/m²     Physical Exam  Vitals and nursing note reviewed.   Constitutional:       General: He is not in acute distress.     Appearance: He is well-developed.   HENT:      Head: Normocephalic and atraumatic.      Ears:      Comments: Erythema near right eardrum, dried blood in right ear canal, scarring of right eardrum, no abnormalities of left TM, canal, or external ear.       Nose: Congestion present.      Mouth/Throat:      Mouth: Mucous membranes are moist.      Pharynx: No oropharyngeal exudate.   Eyes:      Extraocular Movements: Extraocular movements intact.      Conjunctiva/sclera: Conjunctivae normal.      Pupils: Pupils are equal, round, and reactive to light.   Cardiovascular:      Rate and Rhythm: Normal rate and regular rhythm.      Heart sounds: No murmur heard.  Pulmonary:      Effort: Pulmonary effort is normal. No respiratory distress.      Breath sounds: Normal breath sounds.   Abdominal:      Palpations: Abdomen is soft.     "  Tenderness: There is no abdominal tenderness.   Musculoskeletal:         General: No swelling.      Cervical back: Neck supple.   Skin:     General: Skin is warm and dry.      Capillary Refill: Capillary refill takes less than 2 seconds.   Neurological:      Mental Status: He is alert and oriented to person, place, and time.   Psychiatric:         Mood and Affect: Mood normal.       Administrative Statements   I have spent a total time of 30 minutes on 06/12/24 In caring for this patient including Instructions for management, Patient and family education, Impressions, Documenting in the medical record, Reviewing / ordering tests, medicine, procedures  , and Obtaining or reviewing history  .

## 2024-07-02 ENCOUNTER — HOSPITAL ENCOUNTER (OUTPATIENT)
Dept: RADIOLOGY | Facility: HOSPITAL | Age: 74
Discharge: HOME | End: 2024-07-02
Payer: MEDICARE

## 2024-07-02 DIAGNOSIS — M48.062 LUMBAR STENOSIS WITH NEUROGENIC CLAUDICATION: ICD-10-CM

## 2024-07-02 PROCEDURE — 72114 X-RAY EXAM L-S SPINE BENDING: CPT

## 2024-07-08 ENCOUNTER — OFFICE VISIT (OUTPATIENT)
Dept: NEUROSURGERY | Facility: CLINIC | Age: 74
End: 2024-07-08

## 2024-07-08 VITALS
HEART RATE: 66 BPM | HEIGHT: 68 IN | SYSTOLIC BLOOD PRESSURE: 122 MMHG | DIASTOLIC BLOOD PRESSURE: 76 MMHG | WEIGHT: 153 LBS | RESPIRATION RATE: 18 BRPM | BODY MASS INDEX: 23.19 KG/M2 | TEMPERATURE: 98 F | OXYGEN SATURATION: 96 %

## 2024-07-08 DIAGNOSIS — M54.16 LUMBAR RADICULOPATHY: Primary | ICD-10-CM

## 2024-07-08 PROCEDURE — 99024 POSTOP FOLLOW-UP VISIT: CPT | Performed by: STUDENT IN AN ORGANIZED HEALTH CARE EDUCATION/TRAINING PROGRAM

## 2024-07-08 NOTE — PROGRESS NOTES
"Ambulatory Visit  Name: Shaun Mohr      : 1950      MRN: [id number]  Encounter Provider: Bo Aguilar MD  Encounter Date: 2024   Encounter department: [facility] [place]    Assessment & Plan   Shaun Mohr is a 74 y.o. male with hx of LBP and primarily LLE pain who is s/p L3-4 mis laminectomy 3/26/24.  He presents today for 3 month postop visit.  Overall his left leg pain that seems to fit an L3 dermatomal distribution has progressed.  I ordered a repeat MRI lumbar spine to evaluate for residual stenosis centrally and to evaluate his foraminal stenosis.  I did discuss further surgery which would include an L3-4 TLIF and facetectomy on the left for further treatment.  I also discussed conservative treatment options including physical therapy and VARINDER.  He would like to trial conservative options for now.  I placed an order for physical therapy as well as an order to pain management for a left L3-4 transforaminal VARINDER.  Return to clinic after MRI lumbar spine is completed to discuss results.      History of Present Illness     Shaun Mohr is a 74 y.o. male with hx of LBP and primarily LLE pain who is s/p L3-4 mis laminectomy 3/26/24.  He presents today for 3 month postop visit.  Overall his left leg pain is stable to slightly worse.  He is having a hard time walking due to the pain.  The pain radiates from his lower back to his left lower extremity in the lateral thigh and anterior knee.  Feels like sharp stabbing pain.  Is particularly worse with activity.  His x-ray lumbar flex ex appears overall stable on my read.  There is approximately 2 to 3 mm of movement but this appears stable as before surgery.    Review of Systems    Objective     /76 (BP Location: Right arm, Patient Position: Sitting, Cuff Size: Standard)   Pulse 66   Temp 98 °F (36.7 °C) (Temporal)   Resp 18   Ht 5' 8\" (1.727 m)   Wt 69.4 kg (153 lb)   SpO2 96%   BMI 23.26 kg/m²     Physical " Exam  A&OX3  Gaze conjugate  EOMI  FS  TM  Fcx4  5/5 BUE  5/5 BLE  SILT  Administrative Statements

## 2024-07-10 ENCOUNTER — OFFICE VISIT (OUTPATIENT)
Dept: CARDIOLOGY CLINIC | Facility: CLINIC | Age: 74
End: 2024-07-10
Payer: MEDICARE

## 2024-07-10 VITALS
DIASTOLIC BLOOD PRESSURE: 64 MMHG | BODY MASS INDEX: 22.19 KG/M2 | HEART RATE: 70 BPM | OXYGEN SATURATION: 98 % | TEMPERATURE: 98.3 F | SYSTOLIC BLOOD PRESSURE: 104 MMHG | HEIGHT: 68 IN | WEIGHT: 146.4 LBS

## 2024-07-10 DIAGNOSIS — I49.1 PAC (PREMATURE ATRIAL CONTRACTION): ICD-10-CM

## 2024-07-10 DIAGNOSIS — I25.10 CORONARY ARTERY CALCIFICATION: ICD-10-CM

## 2024-07-10 DIAGNOSIS — I49.3 PVC'S (PREMATURE VENTRICULAR CONTRACTIONS): Primary | ICD-10-CM

## 2024-07-10 DIAGNOSIS — I25.84 CORONARY ARTERY CALCIFICATION: ICD-10-CM

## 2024-07-10 DIAGNOSIS — I10 BENIGN ESSENTIAL HYPERTENSION: ICD-10-CM

## 2024-07-10 DIAGNOSIS — E78.5 DYSLIPIDEMIA: ICD-10-CM

## 2024-07-10 DIAGNOSIS — Z82.49 FAMILY HISTORY OF HEART ATTACK: ICD-10-CM

## 2024-07-10 PROCEDURE — 99214 OFFICE O/P EST MOD 30 MIN: CPT | Performed by: INTERNAL MEDICINE

## 2024-07-10 RX ORDER — AMLODIPINE BESYLATE 2.5 MG/1
2.5 TABLET ORAL DAILY PRN
Start: 2024-07-10

## 2024-07-10 NOTE — PROGRESS NOTES
[facility] [place]   [address] [facility] [id number] [phone]                                            Cardiology Office Follow up  Shaun Mohr, 74 y.o. male  YOB: 1950  MRN: [id number] Encounter: [id number]      PCP - Jenny Lang DO  Referring Provider - No ref. provider found    No chief complaint on file.      Assessment  Frequent PVCs  PACs  Family history of heart attack  Father  of heart attack at 52 - did not take care of himself  CT chest - 2022 - prominent coronary artery calcifications  GERD    Plan  Frequent PVCs, PACs  Overview  3/2023: ECG - NSR, PACs and PVCs. No acute ST-T changes  Clinically, he continues to have ectopic beats today during my exam  He does not seem to have any palpitations associated with it though and is mostly asymptomatic  Risk factors  Caffeine - daily 1-2 cups  Alcohol - daily 1-2 glasses of wine  4/3/2024: initial consult with me  No major symptoms, holter ordered  4/10/24 Holter: Sinus rhythm with.  To sinus tachycardia frequent PVCs, 6.4% burden, frequent PACs, 5.7% burden.  No significant tachyarrhythmia  7/10/24: Continues to feel well.  He has cut down on alcohol and caffeine as per prior discussion.  No significant ectopy on clinical exam today  Impression  Low burden PACs and PVCs, which are asymptomatic to patient  Plan  Overall ectopy appears to have improved with lifestyle modifications and he remains asymptomatic and as result we will continue to monitor clinically  Continue lifestyle modifications   Limit caffeine, alcohol intake  Encourage regular exercises  Encouraged doing an additional 15-minute walk in addition to his dog walk (his dog is older and slow)    Dyslipidemia, coronary artery calcifications, family history of heart attack / sudden death    His father apparently  at the age of 52 for heart attack, while he was at a racetrack  No known CAD/stents/heart surgery before or after  He had  a stress test many years ago in [facility], which was apparently normal  Continues to be asymptomatic from a cardiac perspective and is tolerating rosuvastatin without problems  He does have prominent coronary artery calcifications in the LAD on prior CT chest in 2022  Continue rosuvastatin 20 mg daily  Monitor symptoms  Follow-up lipid panel, CMP prior to next office visit   If any symptoms or frequent PVCs, then we will consider follow-up stress testing to evaluate his coronary calcifications    No results found for this visit on 07/10/24.    Orders Placed This Encounter   Procedures   • Lipid Panel with Direct LDL reflex   • Comprehensive metabolic panel       Return in about 6 months (around 1/10/2025), or if symptoms worsen or fail to improve.      History of Present Illness   74 y.o. male , who previously worked as the asbestos removal coordinator, comes in as a new patient for consultation regarding abnormalities detected on ECG done during recent preop evaluation.    He reports no active complaints of chest pain, shortness of breath, palpitations or dizziness.  No known prior history of coronary artery disease or heart failure.  He has had ongoing issues with leg pain and was found to have back issues, and needed spinal surgery for this.  He went through preop ECG through his PCP for this and was found to have ectopic beats on this.  He subsequently had an echocardiogram done and cardiology referral was given for preop evaluation.  Subsequently the echocardiogram was within normal limits and as a result decision was made to proceed with surgery.  He did not have any perioperative cardiac complications and is recovering well thereafter.  He is here today for routine consultation regarding these ectopic beats    He currently walks regularly with his dog doing about 1.5 to 3 miles, but admits that his dog stops a lot and goes quite slowly.    Family history  Father-  of heart attack at 52, while at a race  track.   Mother -  of DM2 at 89. No known CAD   Siblings - youngest of 4 siblings  Brother - stroke in 70s. No known heart problems  No known CAD in siblings    Interval history - 7/10/2024  He returns for follow-up after completing Holter monitor.  He continues to feel well and has not had any symptoms in recent months.  No chest pain, palpitations, shortness of breath, dizziness or lightheadedness, near-syncope or syncope.  He remains compliant with medical therapy and remains active and without any major cardiac limitations      Historical Information   Past Medical History:   Diagnosis Date   • Hernia, inguinal, right 2021   • Hypertension    • Irregular heart beat    • Lung nodule      Past Surgical History:   Procedure Laterality Date   • BUNIONECTOMY Right    • COLONOSCOPY  2018   • HAND SURGERY Right     cyst excision   • VA THOMAS FACETECTOMY & FORAMOTOMY 1 VRT SGM LUMBAR Left 3/26/2024    Procedure: LEFT SIDED APPROACH L3-4 MIS LAMINECTOMY;  Surgeon: Bo Aguilar MD;  Location: [facility];  Service: Neurosurgery   • VA RPR 1ST INGUN HRNA AGE 5 YRS/> REDUCIBLE Right 03/15/2021    Procedure: INGUINAL HERNIA REPAIR;  Surgeon: Shaun Pineda DO;  Location: [facility]  MAIN OR;  Service: General   • SHOULDER SURGERY Right 2018     Family History   Problem Relation Age of Onset   • Diabetes Mother    • Heart attack Father      Current Outpatient Medications   Medication Instructions   • amLODIPine (NORVASC) 2.5 mg, Oral, Daily PRN   • aspirin 81 mg, Oral, Daily at bedtime   • Cholecalciferol (Vitamin D-3) 25 MCG (1000 UT) CAPS 1 capsule, Oral, Daily   • Diclofenac Sodium (VOLTAREN) 2 g, As needed   • diclofenac sodium (VOLTAREN) 50 mg, Oral, 2 times daily   • finasteride (PROSCAR) 5 mg tablet TAKE 1/2 TABLET(2.5 MG) BY MOUTH DAILY AT BEDTIME   • gabapentin (NEURONTIN) 300 mg, Oral, 3 times daily   • Multiple Vitamin (MULTIVITAMIN ADULT PO) 1 tablet, Oral, Daily   • pantoprazole (PROTONIX) 20 mg, Oral, Daily  before breakfast   • rosuvastatin (CRESTOR) 20 mg, Oral, Daily   • Turmeric 400 MG CAPS 1 capsule, Oral, Daily   • vitamin C 1,000 mg, Oral, Daily      No Known Allergies  Social History     Socioeconomic History   • Marital status:      Spouse name: Not on file   • Number of children: Not on file   • Years of education: Not on file   • Highest education level: Not on file   Occupational History   • Not on file   Tobacco Use   • Smoking status: Former     Current packs/day: 0.00     Average packs/day: 1 pack/day for 10.0 years (10.0 ttl pk-yrs)     Types: Cigarettes     Start date:      Quit date:      Years since quittin.5   • Smokeless tobacco: Never   Vaping Use   • Vaping status: Never Used   Substance and Sexual Activity   • Alcohol use: Yes     Alcohol/week: 10.0 standard drinks of alcohol     Types: 10 Glasses of wine per week     Comment: a glass of wine or two a night    • Drug use: Never   • Sexual activity: Yes     Partners: Female   Other Topics Concern   • Not on file   Social History Narrative    · Do you currently or have you served in the  [email or web address]:   Yes      · If Yes, What branch of service:   [employer]      · Were you activated, into active duty, as a member of the [employer] or as a Reservist:   No      Social Determinants of Health     Financial Resource Strain: Low Risk  (2023)    Overall Financial Resource Strain (CARDIA)    • Difficulty of Paying Living Expenses: Not very hard   Food Insecurity: Not on file   Transportation Needs: No Transportation Needs (2023)    PRAPARE - Transportation    • Lack of Transportation (Medical): No    • Lack of Transportation (Non-Medical): No   Physical Activity: Not on file   Stress: Not on file   Social Connections: Not on file   Intimate Partner Violence: Not on file   Housing Stability: Not on file        Review of Systems   All other systems reviewed and are negative.        Vitals:  Vitals:    07/10/24 0934   BP:  "104/64   BP Location: Left arm   Patient Position: Sitting   Cuff Size: Adult   Pulse: 70   Temp: 98.3 °F (36.8 °C)   SpO2: 98%   Weight: 66.4 kg (146 lb 6.4 oz)   Height: 5' 8\" (1.727 m)     BMI - Body mass index is 22.26 kg/m².  Wt Readings from Last 7 Encounters:   07/10/24 66.4 kg (146 lb 6.4 oz)   07/08/24 69.4 kg (153 lb)   07/03/24 69.4 kg (153 lb)   06/12/24 69.4 kg (153 lb)   05/28/24 69.8 kg (153 lb 12.8 oz)   05/13/24 70.3 kg (155 lb)   04/03/24 70.4 kg (155 lb 3.2 oz)       Physical Exam  Vitals and nursing note reviewed.   Constitutional:       General: He is not in acute distress.     Appearance: Normal appearance. He is well-developed and normal weight. He is not ill-appearing or diaphoretic.   HENT:      Head: Normocephalic and atraumatic.      Nose: No congestion.   Eyes:      General: No scleral icterus.     Conjunctiva/sclera: Conjunctivae normal.   Neck:      Vascular: No carotid bruit or JVD.   Cardiovascular:      Rate and Rhythm: Normal rate and regular rhythm. Frequent Extrasystoles are present.     Heart sounds: Normal heart sounds. No murmur heard.     No friction rub. No gallop.   Pulmonary:      Effort: Pulmonary effort is normal. No respiratory distress.      Breath sounds: Normal breath sounds. No wheezing or rales.   Chest:      Chest wall: No tenderness.   Abdominal:      General: There is no distension.      Palpations: Abdomen is soft.      Tenderness: There is no abdominal tenderness.   Musculoskeletal:         General: No swelling, tenderness or deformity.      Cervical back: Neck supple. No muscular tenderness.      Right lower leg: No edema.      Left lower leg: No edema.   Skin:     General: Skin is warm.   Neurological:      General: No focal deficit present.      Mental Status: He is alert and oriented to person, place, and time. Mental status is at baseline.   Psychiatric:         Mood and Affect: Mood normal.         Behavior: Behavior normal.         Thought Content: " "Thought content normal.           Labs:  CBC:   Lab Results   Component Value Date    WBC 4.71 03/12/2024    RBC 4.79 03/12/2024    HGB 14.5 03/12/2024    HCT 44.8 03/12/2024    MCV 94 03/12/2024     03/12/2024    RDW 14.2 03/12/2024       CMP:   Lab Results   Component Value Date    K 4.7 03/12/2024     03/12/2024    CO2 32 03/12/2024    BUN 27 (H) 03/12/2024    CREATININE 1.22 03/12/2024    EGFR 58 03/12/2024    CALCIUM 9.3 03/12/2024    AST 20 03/12/2024    ALT 16 03/12/2024    ALKPHOS 46 03/12/2024       Magnesium:  No results found for: \"MG\"    Lipid Profile:   Lab Results   Component Value Date    HDL 48 10/30/2023    TRIG 115 10/30/2023    LDLCALC 102 (H) 10/30/2023       Thyroid Studies: No results found for: \"ARQ1OMTLRUKR\", \"T3FREE\", \"FREET4\", \"C6HGJNJ\", \"I3RSFHB\"    A1c:  No components found for: \"HGA1C\"    INR:  Lab Results   Component Value Date    INR 1.03 03/12/2024   5    Cardiac testing:   No results found for this or any previous visit.    No results found for this or any previous visit.    No results found for this or any previous visit.    No results found for this or any previous visit.      XR spine lumbar complete w bending minimum 6 views  Narrative: LUMBAR SPINE    INDICATION:   Spinal stenosis, lumbar region with neurogenic claudication.     COMPARISON: March 7, 2024    VIEWS:  XR SPINE LUMBAR COMPLETE W BENDING MINIMUM 6 VIEWS  Images: 7    FINDINGS:    There are 5 non rib bearing lumbar vertebral bodies.     There is no evidence of acute fracture or destructive osseous lesion.    Anterior spondylolisthesis of L3 on L4 is noted of approximately 15% with approximately 4 mm of abnormal translation on flexion and extension.    Degenerative changes are present most notably at the L4-5 level with loss of disc space.    The pedicles appear intact.    Soft tissues are remarkable for aortic mural calcification..  Impression: Patient with anterior spondylolisthesis of L4 on L5 with " abnormal motion on flexion and extension views. Degenerative changes primarily at L4-5. No acute osseous injury identified.    Electronically signed: 07/02/2024 11:54 PM Jarrod Rodas MD

## 2024-07-28 ENCOUNTER — HOSPITAL ENCOUNTER (OUTPATIENT)
Dept: MRI IMAGING | Facility: HOSPITAL | Age: 74
Discharge: HOME | End: 2024-07-28
Attending: STUDENT IN AN ORGANIZED HEALTH CARE EDUCATION/TRAINING PROGRAM
Payer: MEDICARE

## 2024-07-28 DIAGNOSIS — M54.16 LUMBAR RADICULOPATHY: ICD-10-CM

## 2024-07-28 PROCEDURE — 72148 MRI LUMBAR SPINE W/O DYE: CPT

## 2024-08-01 ENCOUNTER — OFFICE VISIT (OUTPATIENT)
Dept: NEUROSURGERY | Facility: CLINIC | Age: 74
End: 2024-08-01
Payer: MEDICARE

## 2024-08-01 ENCOUNTER — TELEPHONE (OUTPATIENT)
Dept: NEUROSURGERY | Facility: CLINIC | Age: 74
End: 2024-08-01

## 2024-08-01 VITALS
SYSTOLIC BLOOD PRESSURE: 138 MMHG | HEIGHT: 68 IN | WEIGHT: 148 LBS | RESPIRATION RATE: 20 BRPM | TEMPERATURE: 98.2 F | OXYGEN SATURATION: 98 % | DIASTOLIC BLOOD PRESSURE: 72 MMHG | BODY MASS INDEX: 22.43 KG/M2

## 2024-08-01 DIAGNOSIS — M54.16 LUMBAR RADICULOPATHY: Primary | ICD-10-CM

## 2024-08-01 PROCEDURE — 99214 OFFICE O/P EST MOD 30 MIN: CPT | Performed by: STUDENT IN AN ORGANIZED HEALTH CARE EDUCATION/TRAINING PROGRAM

## 2024-08-01 RX ORDER — METHYLPREDNISOLONE 4 MG/1
TABLET ORAL
Qty: 1 EACH | Refills: 0 | Status: SHIPPED | OUTPATIENT
Start: 2024-08-01

## 2024-08-01 NOTE — TELEPHONE ENCOUNTER
8/1/24 Patient will call pain management to schedule injections he will call to follow up in 2 weeks with Dr. Aguilar.

## 2024-08-01 NOTE — PROGRESS NOTES
Ambulatory Visit  Name: Shaun Mohr      : 1950      MRN: [id number]  Encounter Provider: Bo Aguilar MD  Encounter Date: 2024   Encounter department: [facility] [place]    Assessment & Plan   Shaun Mohr is a 74 y.o. male with hx of LBP and primarily LLE pain who is s/p L3-4 mis laminectomy 3/26/24.  He is approximately 4 months postop.  Overall his leg pain after surgery has improved however he still gets residual left leg pain in the anterior thigh down to his knee that is worse with activity and better with rest.  I reviewed the MRI lumbar spine results with him and his wife.  Overall it shows good decompression of the central canal at L3-4 however he does still continue to have left-sided L3-4 foraminal stenosis.  He also has foraminal stenosis bilaterally at L4-5 and L5-S1 but it is much worse on the right than the left.  He does not have any right-sided leg symptoms and all of his symptoms are on his left.  I discussed treatment options with him which included continued conservative management with physical therapy and epidural steroid injections, a L3-4 TLIF with a left L3-4 facetectomy for decompression of the L3 nerve, and a spinal cord stimulator.  He states that he would like to avoid surgery at this time and continue with conservative management.  A referral was placed to pain management for a left L3-4 transforaminal VARINDER as well as an order to physical therapy.  I also prescribed him a Medrol Dosepak.  He can return to clinic after the VARINDER is completed to discuss how symptoms are progressing.  I did discuss with him that if he would ultimately like to proceed with surgery I will have to refer him to my partner to perform the surgery as I will unfortunately be leaving the practice in October due to personal family issues in order to move closer to family.    I spent 30 minutes obtaining history and physical exam, reviewing imaging, discussing treatment  "options, and coordinating care.        History of Present Illness     Shaun Mohr is a 74 y.o. male with hx of LBP and primarily LLE pain who is s/p L3-4 mis laminectomy 3/26/24.  He is approximately 4 months postop.  Overall his leg pain after surgery has improved however he still gets residual left leg pain in the anterior thigh down to his knee that is worse with activity and better with rest.  He says most the time it is approximately a 3-4 out of 10 in severity.  On some days it is so severe that it limits his quality of life.  He presents to clinic today to discuss results of the MRI lumbar spine.    Review of Systems    Objective     /72 (BP Location: Left arm, Patient Position: Sitting, Cuff Size: Standard)   Temp 98.2 °F (36.8 °C) (Temporal)   Resp 20   Ht 5' 8\" (1.727 m)   Wt 67.1 kg (148 lb)   SpO2 98%   BMI 22.50 kg/m²     Physical Exam  A&OX3  Gaze conjugate  EOMI  FS  TM  Fcx4  5/5 BUE  5/5 BLE  SILT  Administrative Statements           "

## 2024-10-29 ENCOUNTER — HOSPITAL ENCOUNTER (OUTPATIENT)
Dept: CT IMAGING | Facility: HOSPITAL | Age: 74
Discharge: HOME | End: 2024-10-29
Payer: MEDICARE

## 2024-10-29 DIAGNOSIS — R91.8 PULMONARY NODULES: ICD-10-CM

## 2024-10-29 PROCEDURE — G1004 CDSM NDSC: HCPCS

## 2024-10-29 PROCEDURE — 71250 CT THORAX DX C-: CPT

## 2024-11-04 ENCOUNTER — IMMUNIZATIONS (OUTPATIENT)
Age: 74
End: 2024-11-04
Payer: MEDICARE

## 2024-11-04 DIAGNOSIS — Z23 ENCOUNTER FOR IMMUNIZATION: Primary | ICD-10-CM

## 2024-11-04 PROCEDURE — 90662 IIV NO PRSV INCREASED AG IM: CPT

## 2024-11-04 PROCEDURE — G0008 ADMIN INFLUENZA VIRUS VAC: HCPCS

## 2024-12-06 DIAGNOSIS — N40.0 BENIGN PROSTATIC HYPERPLASIA, UNSPECIFIED WHETHER LOWER URINARY TRACT SYMPTOMS PRESENT: ICD-10-CM

## 2024-12-06 RX ORDER — FINASTERIDE 5 MG/1
TABLET, FILM COATED ORAL
Qty: 45 TABLET | Refills: 1 | Status: SHIPPED | OUTPATIENT
Start: 2024-12-06

## 2024-12-07 DIAGNOSIS — E78.5 HYPERLIPIDEMIA, UNSPECIFIED HYPERLIPIDEMIA TYPE: ICD-10-CM

## 2024-12-09 RX ORDER — ROSUVASTATIN CALCIUM 20 MG/1
20 TABLET, COATED ORAL DAILY
Qty: 30 TABLET | Refills: 0 | Status: SHIPPED | OUTPATIENT
Start: 2024-12-09

## 2024-12-19 ENCOUNTER — APPOINTMENT (OUTPATIENT)
Dept: LAB | Facility: AMBULARY SURGERY CENTER | Age: 74
End: 2024-12-19
Payer: MEDICARE

## 2024-12-19 DIAGNOSIS — I10 BENIGN ESSENTIAL HYPERTENSION: ICD-10-CM

## 2024-12-19 DIAGNOSIS — E78.5 DYSLIPIDEMIA: ICD-10-CM

## 2024-12-19 LAB
ALBUMIN SERPL BCG-MCNC: 4.3 G/DL (ref 3.5–5)
ALP SERPL-CCNC: 51 U/L (ref 34–104)
ALT SERPL W P-5'-P-CCNC: 17 U/L (ref 7–52)
ANION GAP SERPL CALCULATED.3IONS-SCNC: 9 MMOL/L (ref 4–13)
AST SERPL W P-5'-P-CCNC: 19 U/L (ref 13–39)
BILIRUB SERPL-MCNC: 0.49 MG/DL (ref 0.2–1)
BUN SERPL-MCNC: 25 MG/DL (ref 5–25)
CALCIUM SERPL-MCNC: 9.7 MG/DL (ref 8.4–10.2)
CHLORIDE SERPL-SCNC: 104 MMOL/L (ref 96–108)
CO2 SERPL-SCNC: 30 MMOL/L (ref 21–32)
CREAT SERPL-MCNC: 1.06 MG/DL (ref 0.6–1.3)
GFR SERPL CREATININE-BSD FRML MDRD: 69 ML/MIN/1.73SQ M
GLUCOSE P FAST SERPL-MCNC: 132 MG/DL (ref 65–99)
POTASSIUM SERPL-SCNC: 4.7 MMOL/L (ref 3.5–5.3)
PROT SERPL-MCNC: 6.4 G/DL (ref 6.4–8.4)
SODIUM SERPL-SCNC: 143 MMOL/L (ref 135–147)

## 2024-12-19 PROCEDURE — 80053 COMPREHEN METABOLIC PANEL: CPT

## 2024-12-19 PROCEDURE — 36415 COLL VENOUS BLD VENIPUNCTURE: CPT

## 2024-12-30 ENCOUNTER — RESULTS FOLLOW-UP (OUTPATIENT)
Dept: CARDIOLOGY CLINIC | Facility: CLINIC | Age: 74
End: 2024-12-30

## 2025-01-07 ENCOUNTER — TELEPHONE (OUTPATIENT)
Dept: CARDIOLOGY CLINIC | Facility: CLINIC | Age: 75
End: 2025-01-07

## 2025-01-07 NOTE — TELEPHONE ENCOUNTER
BELLAM for patient to complete his liped panel if possible before cardiology appointment on 1/9 3 pm

## 2025-01-09 ENCOUNTER — TELEPHONE (OUTPATIENT)
Dept: CARDIAC REHAB | Facility: CLINIC | Age: 75
End: 2025-01-09

## 2025-01-10 ENCOUNTER — TRANSCRIBE ORDERS (OUTPATIENT)
Dept: CARDIAC REHAB | Facility: CLINIC | Age: 75
End: 2025-01-10

## 2025-01-10 DIAGNOSIS — I21.4 NSTEMI (NON-ST ELEVATED MYOCARDIAL INFARCTION) (HCC): Primary | ICD-10-CM

## 2025-01-17 ENCOUNTER — TELEPHONE (OUTPATIENT)
Age: 75
End: 2025-01-17

## 2025-01-17 DIAGNOSIS — Z12.11 ENCOUNTER FOR SCREENING COLONOSCOPY: Primary | ICD-10-CM

## 2025-01-17 NOTE — TELEPHONE ENCOUNTER
Patient called and asked if a referral could be ordered for him to get a colonoscopy. He would like to go to Dr Genesis Llanos.

## 2025-01-21 PROBLEM — I25.10 CORONARY ARTERY CALCIFICATION SEEN ON CT SCAN: Status: ACTIVE | Noted: 2025-01-21

## 2025-01-21 PROBLEM — Z82.49 FAMILY HISTORY OF EARLY CAD: Status: ACTIVE | Noted: 2025-01-21

## 2025-01-21 NOTE — ASSESSMENT & PLAN NOTE
1/4/25 LDL 92  Was On rosuvastatin 20 mg daily, now on lipitor 80 mg qd  Repeat lipid panel in 3 months

## 2025-01-21 NOTE — PROGRESS NOTES
[facility]  General Cardiology Note  Shaun LEMA Onur  1950  [id number]      Referring Provider - No ref. provider found  Chief Complaint   Patient presents with    Follow-up       Assessment & Plan  Benign essential hypertension  BP controlled  No longer on amlodipine 2.5 qd  Continue on Losartan 25 mg daily and metoprolol 25 mg daily  Hyperlipidemia, unspecified hyperlipidemia type  25 LDL 92  Was On rosuvastatin 20 mg daily, now on lipitor 80 mg qd  Repeat lipid panel in 3 months  Coronary artery disease involving native coronary artery of native heart without angina pectoris  Recent PORTIA placement  to RCA 25  Continue on aspirin+plavix, statin and BB  Denies any angina or anginal equivalent  Has Cardiac rehab eval on   Family history of early CAD  Family history of heart attack  Father  of heart attack at 52 - did not take care of himself  Coronary artery calcification seen on CT scan  CT chest - 2022 - prominent coronary artery calcifications  S/P primary angioplasty with coronary stent  S/P PCI with PORTIA stent to RCA  Right groin site check with  no hematoma, no bruises, no bruit. Pulses are palpable     Procedure/testing if ordered:  Risks Vs benefits discussed   Medication side effects reviewed with patient in detail and all their questions answered to their satisfaction.    Will RTO in 3 m or sooner if necessary  Patient / Caretaker was advised and educated to call our office  immediately if  patient has any new symptoms of chest pain/shortness of breath, near-syncope, syncope, light headedness sustained palpitations  or any other cardiovascular symptoms before their scheduled follow-up appointment.  ED precautions reviewed    Interval History: 74 y.o.  male  with PMH as below is here for HFU  Patient of Dr. Pichardo  Hx of frequent PACs, PVCs, asymptomatic  Admitted at [facility] on 1/3/25. He had some chest pain and SOB. Got a stress test  then a cardiac cath with stent placement to RCA    NM Exercise Stress and Rest Myocardial Perfusion:  Reversible defect involving inferior wall of the left ventricle,  suggesting presence of myocardial ischemia.    He underwent LHC on 1/5/2025 notable for high grade lesion of the RCA in the transition from the mid to distal vessel. There were no complications. Afterwards, Metoprolol was consolidated to 25mg Metoprolol succinate daily and the patient was started on losartan 25mg daily.     Cardiac Cath 1/5/2025  Preliminary findings: Right dominant  LVEDP: 12 mmHg  LM: mild disease  LAD: mild diffuse  LCX: normal  RCA: high grade lesion in the transition from the mid to distal vessel.     Today,  He reports no acute complaints. He presents with his wife.    Walks about 2 miles a day with his dog. Diet is controlled but does have sweets.     Patient Active Problem List    Diagnosis Date Noted    Family history of early CAD 01/21/2025    Coronary artery calcification seen on CT scan 01/21/2025    Lumbar radiculitis 09/18/2023    Chronic bilateral low back pain with bilateral sciatica 09/18/2023    Piriformis syndrome of left side 08/23/2023    Nocturia 08/23/2023    Conductive hearing loss 12/17/2021    Benign essential hypertension 12/17/2021    Dysfunction of eustachian tube 12/17/2021    Lumbar disc herniation with radiculopathy 06/07/2021    Spondylolisthesis of lumbar region 06/07/2021    Left leg pain 05/06/2021    Prediabetes 03/06/2021    Hyperlipidemia 02/24/2021    Allergic rhinitis 06/25/2018    Asbestos exposure 11/26/2013    History of smoking 11/26/2013    Pulmonary nodules 11/26/2013     Past Medical History:   Diagnosis Date    Hernia, inguinal, right 02/24/2021    Hypertension     Irregular heart beat     Lung nodule      Social History     Tobacco Use    Smoking status: Former     Current packs/day: 0.00     Average packs/day: 1 pack/day for 10.0 years (10.0 ttl pk-yrs)     Types: Cigarettes     Start  date:      Quit date:      Years since quittin.0    Smokeless tobacco: Never   Vaping Use    Vaping status: Never Used   Substance Use Topics    Alcohol use: Yes     Alcohol/week: 10.0 standard drinks of alcohol     Types: 10 Glasses of wine per week     Comment: a glass of wine or two a night     Drug use: Never      Family History   Problem Relation Age of Onset    Diabetes Mother     Heart attack Father      Past Surgical History:   Procedure Laterality Date    BUNIONECTOMY Right     COLONOSCOPY  2018    HAND SURGERY Right     cyst excision    SD THOMAS FACETECTOMY & FORAMOTOMY 1 VRT SGM LUMBAR Left 3/26/2024    Procedure: LEFT SIDED APPROACH L3-4 MIS LAMINECTOMY;  Surgeon: Bo Aguilar MD;  Location: [facility];  Service: Neurosurgery    SD RPR 1ST INGUN HRNA AGE 5 YRS/> REDUCIBLE Right 03/15/2021    Procedure: INGUINAL HERNIA REPAIR;  Surgeon: Shaun Pineda DO;  Location: [facility];  Service: General    SHOULDER SURGERY Right 2018       Current Outpatient Medications:     Ascorbic Acid (vitamin C) 1000 MG tablet, Take 1,000 mg by mouth daily, Disp: , Rfl:     aspirin 81 mg chewable tablet, Chew 81 mg daily at bedtime, Disp: , Rfl:     atorvastatin (LIPITOR) 80 mg tablet, Take 80 mg by mouth every evening, Disp: , Rfl:     Cholecalciferol (Vitamin D-3) 25 MCG (1000 UT) CAPS, Take 1 capsule by mouth daily, Disp: , Rfl:     Cinnamon 500 MG capsule, Take by mouth, Disp: , Rfl:     clopidogrel (PLAVIX) 75 mg tablet, Take 75 mg by mouth daily, Disp: , Rfl:     diclofenac sodium (VOLTAREN) 50 mg EC tablet, Take 50 mg by mouth 2 (two) times a day, Disp: , Rfl:     finasteride (PROSCAR) 5 mg tablet, TAKE 1/2 TABLET(2.5 MG) BY MOUTH DAILY AT BEDTIME, Disp: 45 tablet, Rfl: 1    losartan (COZAAR) 25 mg tablet, Take 25 mg by mouth daily, Disp: , Rfl:     metoprolol succinate (TOPROL-XL) 25 mg 24 hr tablet, Take 25 mg by mouth daily, Disp: , Rfl:     Multiple Vitamin (MULTIVITAMIN ADULT PO), Take 1 tablet by  "mouth daily, Disp: , Rfl:     nitroglycerin (NITROSTAT) 0.4 mg SL tablet, Place 0.4 mg under the tongue every 5 (five) minutes as needed, Disp: , Rfl:     Turmeric 400 MG CAPS, Take 1 capsule by mouth daily, Disp: , Rfl:     Zinc 50 MG TABS, Take by mouth, Disp: , Rfl:     amLODIPine (NORVASC) 2.5 mg tablet, Take 1 tablet (2.5 mg total) by mouth daily as needed (for BP > 140/90) (Patient not taking: Reported on 1/22/2025), Disp: , Rfl:     Diclofenac Sodium (VOLTAREN) 1 %, Apply 2 g topically if needed (Patient not taking: Reported on 7/10/2024), Disp: , Rfl:     gabapentin (Neurontin) 300 mg capsule, Take 1 capsule (300 mg total) by mouth 3 (three) times a day (Patient taking differently: Take 100 mg by mouth 2 (two) times a day), Disp: 90 capsule, Rfl: 2    methylPREDNISolone 4 MG tablet therapy pack, Use as directed on package, Disp: 1 each, Rfl: 0    pantoprazole (PROTONIX) 20 mg tablet, Take 1 tablet (20 mg total) by mouth daily before breakfast, Disp: 30 tablet, Rfl: 5    No Known Allergies    Vitals:    01/22/25 1025 01/22/25 1049   BP: 158/92 120/60   BP Location: Left arm    Patient Position: Sitting    Pulse: 65    Resp: 18    SpO2: 98%    Weight: 68.6 kg (151 lb 3.2 oz)    Height: 5' 8\" (1.727 m)         Vitals:    01/22/25 1025   Weight: 68.6 kg (151 lb 3.2 oz)      Height: 5' 8\" (172.7 cm)   Body mass index is 22.99 kg/m².    Labs:   Lab Results   Component Value Date/Time    CHOLESTEROL 173 10/30/2023 08:28 AM    TRIG 115 10/30/2023 08:28 AM    HDL 48 10/30/2023 08:28 AM    LDLCALC 102 (H) 10/30/2023 08:28 AM      Lab Results   Component Value Date    SODIUM 143 12/19/2024    K 4.7 12/19/2024     12/19/2024    CREATININE 1.06 12/19/2024    EGFR 69 12/19/2024    BUN 25 12/19/2024    CO2 30 12/19/2024    ALT 17 12/19/2024    AST 19 12/19/2024    INR 1.03 03/12/2024    GLUF 132 (H) 12/19/2024    HGBA1C 6.4 (H) 03/12/2024    WBC 4.71 03/12/2024    HGB 14.5 03/12/2024    HCT 44.8 03/12/2024     " 2024         Imaging: Cardiac Catheterization  Result Date: 2025  Narrative:                       [facility]                                          Cardiac Catheterization/Intervention Report                                                                                                      Patient Name: Shaun Mohr                                                 Procedure Date: 2025                                                       A #: [id number]                                                                 Primary Physician: Eileen Giraldo                                           Case #:                                                                  File Name: [id number]                                               Catheterization Order Number: [id number]                                         Encounter Number: [id number]                                                                                                                                      [facility]                                                     Final Report [place], [place]                                                                                                                                            Patient Name:         Shaun Mohr               ID#:        [id number]                                                          :        1950     Procedure Date:       2025                Case #:                                                                   Room:       5               Case Physician:       Eileen Giraldo M.D.        Start:      11:53             Fellow:             Austin Null Jr., M.D.    Admission:  2025                                                                                      Referring  Physician:  Sloange Santacruz M.D.                                                                                                                      ===============================================================================                                                                                  Procedures:                                                                           * Coronary Angiography                                                           * Left Heart Catheterization                                                     * Coronary Ultrasound                                                           * Coronary Stent Insertion                                                       * Vascular Closure Device Deployment                                             * Access Site Angiography                                                       * Vascular Ultrasound                                                                                                                                      History                                                                               Shaun Mohr is a 74 year old man. He has hypertension and a family         history of coronary artery disease. The patient's smoking status is             Former. He has hypercholesterolemia. The patient has untreated diabetes.         He is also status post a recent non-ST elevation myocardial infarction.         Prior to the initiation of this procedure, the patient was designated as         ASA Class III. The [facility] clinical frailty scale is 3: Managing Well.                                                                                        Diagnostic Tests:                                                                     Prior Coronary Angiography:                                                          LV ejection fraction within 6 months is 55%.                                                                                                                 Electrocardiography:                                                                  EKG was assessed by ECG. EKG was Abnormal. EKG showed T-wave                    inversions and other abnormality.                                                                                                                            Stress or Imaging Studies:                                                            A stress test with SPECT imaging was performed on 01/02/2025 and was            Positive with Intermediate results.                                                                                                                          Medications Prior to Procedure:                                                      Aspirin, Beta Blocker and Statin.                                                                                                                      Indications for Diagnostic Cath:                                                       The priority of the diagnostic procedure was Urgent. The indication for         the cath lab visit is ACS greater than 24 hrs. Chest pain symptom               assessment was: Typical Angina.                                                                                                                            Technique:                                                                             A 7Fr sheath was inserted in the right femoral artery utilizing the             Seldinger technique. The left coronary artery was injected utilizing a           6Fr JL 4 catheter. A 7Fr JR 4 catheter was used to inject the right             coronary artery. Left ventricular pressure was performed utilizing a 6Fr         JR 4 catheter. Coronary stent insertion was performed and the equipment         utilized will be described in the intervention summary section. 8,000           units of heparin were administered. A  total of 200cc of Omnipaque were           opened, 99cc of Omnipaque were administered and 101cc of Omnipaque were         wasted. Radiation: Fluoro time was 27.3 minutes, dose area product was           31.00 Gy/cm2 and air kerma was 495 mGY. See the case log for additional         details.                                                                                                                                                          The patient received the following medications prior to and during the           procedure:                                                                            Unfractionated Heparin and Clopidogrel.                                                                                                                Hemodynamics:                                                                         Left Heart Pressures                                                                  Resting:                                                                                    Syst Diast     EDP      a      v       m                                     Ao 123   63                              83                                                  12                                                                                                                        Coronary Angiography:                                                                 Dominance: Right                                                                                                                                                  Left Main                                                                            There was a 15% single discrete stenosis of the proximal segment of              the left main artery.  The left main was moderate in size.                                                                                                  Left Anterior Descending                                                               There was mild diffuse (<=25% stenosis) disease of the mid segment              of the left anterior descending artery (LAD).                                                                                                                Left Circumflex                                                                      The left circumflex (LCX) was normal, free of disease.                                                                                                      Right Coronary Artery                                                                There was a 98% calcified and tortuous long segmental stenosis of                the mid segment of the right coronary artery (RCA).  The RCA was                large.                                                                                                                                                Intravascular Imaging/Physiology:                                                     Intravascular Ultrasound was performed in the mid RCA using a 7 Fr JR 4         guiding catheter and a 3.1 Fr Refinity 42 MHz catheter using auto 1             mm/sec pullback.  Imaging was successful.  Image quality was good.               Indication: IVUS performed for pre intervention planning.                       IVUS performed prior to any vessel manipulation.  IVUS performed after           pre-dilation.  IVUS performed after vessel manipulation.                         Findings Pre-Intervention: scattered plaque, calcification and with             nodules. These measurements were performed after pre-dilation of the             lesion.                                                                         Findings Post-Intervention: The stent was well expanded and apposed.             Conclusions: stent/intervention appears optimized.                                                                                                           Indication for Intervention:                                                           Coronary intervention was indicated for primary therapy for an acute             myocardial infarction. The priority for the procedure was Urgent. [facility]. LVEF within one week was         55%. Syntax Score was Low.                                                                                                                                  Intervention Summary:                                                                 Right Coronary Artery                                                                Mid 98%                                                                                Stent insertion was performed on the 98% stenosis in the mid                    segment of the RCA. This was a de carlee lesion. According to                      the ACC/AHA classification system, this lesion was a type C                      moderate risk lesion. Primary prevention of restenosis was the                  indication for stent insertion. This was the culprit lesion. A                  guidewire was placed across this lesion. Vessel flow pre                        intervention was LAKEISHA 3. Lesion length was 42mm. This lesion                    was severely calcified.                                                                                                                                           Stent insertion was accomplished through a 7 Fr. JR 4 guide.                    The lesion was predilated with a 2.50mm EUPHORA 20 MM balloon                    with a maximum inflation pressure of 12 atmospheres.  A                          premounted 2.50 x 48 mm Synergy XD (PORTIA) was deployed with a                    maximum inflation pressure of 11 atmospheres.  Following stent                  deployment, the lesion was dilated using a 3.50mm NC EUPHORA                     12 MM balloon with a maximum inflation pressure of 18                            atmospheres.                                                                                                                                                     The final outcome was defined as successful. There was no                        residual stenosis following this intervention. The final LAKEISHA                    flow was 3.                                                                                                                                      Vascular Access:                                                                       Vascular Access Angiogram:                                                            A selective angiogram at the right femoral artery revealed no                    significant obstructive disease.                                                                                                                            Vascular Ultrasound:                                                                  Ultrasound of the right femoral artery was used to guide access and              showed vessel patent with mild disease. Needle entry was observed.                                                                                          Vascular Access Management:                                                          A 6 Fr Perclose was deployed at the right femoral artery access                  site. This device was successful. Manual Compression of the right                femoral artery access site was performed.                                                                                                              Dual Antiplatelet (DAPT) Recommendations:                                             Drug eluting stent (PORTIA) inserted.                                                                                                                                P2Y12 Loading dose  administered prior to arrival in the cath lab.                                                                                                Recommended anti-platelet/anti-thrombotic regimen:                               Continue aspirin 81 mg daily for 12 months then stop.                           Continue clopidogrel 75 mg daily for indefinitely.                                                                                                                These recommendations are made at the time of the intervention. Patient         and provider preferences or a changing clinical situation may require           modification of this regimen. Consult [facility] for         questions.                                                                                                                                                        The 1 year bleeding risk as calculated by the PRECISE DAPT score is High         risk.                                                                                                                                                            This patient may be at high bleeding risk. In patients treated with DAPT         after coronary stent implantation who develop a high risk of bleeding, or       are at high risk of severe bleeding complication, or develop significant         overt bleeding, discontinuation of P2Y12 inhibitor therapy after 3 months       for stable ischemic heart disease (SIHD) or after 6 months for acute             coronary syndrome (ACS) may be reasonable.                                                                                                                  Conclusions:                                                                           * One vessel coronary artery disease (RCA)                                       * Successful stent insertion of the mid RCA lesion                               * See Dual Antiplatelet (DAPT)  Recommendations above                             * Optimize medical therapy for non-culprit non-obstructive CAD.                                                                                            Complications/Events:                                                                 The patient had no complications during these procedures.                                                                                                  Post Procedure Fluid Recommendations:                                                 IV fluid at 321 mL/hr for 4 hours for a total of 1,284 mL. These                 recommendations are made at the time of the procedure. Patient and               provider preferences or a changing clinical situation may require               modification of this regimen.                                                                                                                              Recommendations:                                                                       Based upon the results of these procedures, it was recommended that the         patient be managed with medical therapy and have coronary intervention.                                                                                    Comments:                                                                             The patient was found to have a high grade lesion in the mid RCA. This           was managed successfully with one long PORTIA. Recommend DAPT x 12 months.                                                                                        The attending physician was present for the entire procedure.                                                                                                    Dr. Eileen Giraldo M.D. was present during the moderate sedation             intraservice time as documented by the sedation nurse.  Case time = 01:37.                                                                                         Dr. Eileen Giraldo M.D. performed the coronary angiography, left heart       catheterization, IVUS # coronary, stent insertion-coronary, access site         angiography, vascular ultrasound and vascular closure device.                                                                                                                                                                                                                                        ________________________                                                        Eileen Giraldo M.D.                                                                                    Electronically Signed by: Eileen Giraldo M.D.                                                                                                                Report Finalized: 2025  13:55                                             Report Last Ammended: 2025  13:51                                             Echocardiogram Transthoracic  Result Date: 2025  Narrative: [address] [place] [address] Phone: [phone]                                Echocardiogram Report ______________________________________________________________________________ Name: NALINI APDILLA                            Study Date: 2025 10:27 AMBP: 112/56 mmHg MRN: [id number]                                                                 HR: 67 : 1950                                   Height: 173 cm                 Account: [id number] Age: 74 yrs                                       Weight: 64 kg Gender: Male                                                   BSA: 1.8 m2 Ordering Physician: NARDA KRAMER Referring Physician: ARIADNA SILVER Performed By: Chin Lopez RDCS Reason For Study: NSTEMI Exam Location: [facility]. ______________________________________________________________________________  Interpretation Summary Left ventricle is of normal size. Wall thickness is normal. Left ventricular systolic function is normal. The left ventricular ejection fraction is 55% by Shultz's biplane. There are no segmental wall motion abnormalities. The right ventricle is of normal size. Right ventricular systolic function is normal. No previous to compare.   ______________________________________________________________________________ Procedure Complete-[id number]. Satisfactory quality. There is normal sinus rhythm. Left Ventricle Left ventricle is of normal size. Wall thickness is normal. Left ventricular systolic function is normal. The left ventricular ejection fraction is 55% by Shultz's biplane. Left ventricular ejection fraction is estimated visually at 55- 60%. There are no segmental wall motion abnormalities. Right Ventricle The right ventricle is of normal size. Right ventricular systolic function is normal. Left Atrium The left atrium is normal. No abnormality of the interatrial septum is identified. Right Atrium The right atrium is normal. Aortic Valve The aortic valve is probably trileaflet. The aortic valve is not well visualized. The aortic valve is mildly thickened. There is no aortic stenosis. There is no aortic regurgitation. Mitral Valve The mitral valve is structurally normal. There is trace mitral regurgitation. Tricuspid Valve The tricuspid valve is structurally and functionally normal. There is trace tricuspid regurgitation. Pulmonic Valve The pulmonic valve appears to be structurally and functionally normal. Great Arteries The aortic root is of normal size. No abnormalities are identified. The ascending aorta is not well visualized. No abnormalities of the pulmonary artery are identified. Venous Inferior vena cava is normal in size. Inferior vena cava collapse greater than 50% with respiration. Pericardium/Pleural The pericardium appears normal. Hemodynamics The peak right ventricular systolic  pressure is 21 mmHg. The estimated right atrial pressure is 3mmHg. Left ventricular diastolic function is normal. Left ventricular filling pressure is normal. Ejection Fraction        2D Measurements                 Volumes EF(MOD-bp): 54.8 %        IVSd: 0.76 cm                  LAV(MOD-bp) Indexed:                           LVIDd: 3.9 cm                           LVIDs: 2.9 cm                  32.2 ml/m2                           LVPWd: 0.81 cm                 RA A4Cs_phl: 12.9 cm2                                                          EDV(MOD-bp) Indexed:                           RWT: 0.42 ratio                           LV mass(C)d: 85.9 grams        52.1 ml/m2                           LV mass(C)dI: 48.7 grams/m2    ESV(MOD-bp) Indexed:                           Ao root diam: 3.2 cm           23.6 ml/m2                           Ao root diam index: 1.8        SV(LVOT): 63.9 ml                           LVOT diam: 2.0 cm                                                          SI(LVOT): 36.2 ml/m2 Doppler LV V1 VTI: 20.1 cm Ao Max Bong: 91.7 cm/sec Ao valve max: 3.4 mmHg MV E max bong: 63.0 cm/sec MV A max bong: 59.7 cm/sec MV E/A: 1.1 MV dec time: 0.23 sec Lat Peak E' Bong: 7.9 cm/sec E/e' (lat): 8.0 Med Peak E' Bong: 7.9 cm/sec E/e' (med): 8.0 E/e' Average: 8.0 TR max bong: 209.0 cm/sec I      WMSI = 1.00     % Normal = 100                                                                 Segments  Size X - Cannot   1 - Normal   2 -         3 - Akinetic 4 -          1-2     small Interpret                 Hypokinetic              Dyskinetic   3-5     moderate 5 -                                                             6-14    large Aneurysmal                                                      15-16   diffuse ______________________________________________________________________________ Electronically signed by: Nash Jeffery MD on 01/04/2025 12:34 PM      ECG:     Reviewed by SHAYNE Redding      Review  of Systems   Constitutional: Negative.   HENT: Negative.     Eyes: Negative.    Cardiovascular: Negative.    Respiratory: Negative.     Gastrointestinal: Negative.    All other systems reviewed and are negative.    Except as noted in HPI, is otherwise reviewed in detail and a 12 point review of systems is negative.    Physical Exam  Vitals reviewed.   Constitutional:       General: He is not in acute distress.     Appearance: Normal appearance. He is not diaphoretic.   HENT:      Head: Normocephalic and atraumatic.   Eyes:      General: No scleral icterus.  Cardiovascular:      Rate and Rhythm: Normal rate and regular rhythm.      Pulses:           Radial pulses are 2+ on the right side and 2+ on the left side.        Femoral pulses are 2+ on the right side and 2+ on the left side.       Dorsalis pedis pulses are 1+ on the right side and 1+ on the left side.      Heart sounds: Normal heart sounds, S1 normal and S2 normal.   Pulmonary:      Effort: Pulmonary effort is normal. No tachypnea or respiratory distress.      Breath sounds: Normal breath sounds. No wheezing or rales.   Abdominal:      General: Bowel sounds are normal. There is no distension.      Palpations: Abdomen is soft.   Musculoskeletal:      Right lower leg: No edema.      Left lower leg: No edema.   Skin:     General: Skin is warm and dry.      Capillary Refill: Capillary refill takes less than 2 seconds.   Neurological:      General: No focal deficit present.      Mental Status: He is alert and oriented to person, place, and time.      Gait: Gait normal.   Psychiatric:         Mood and Affect: Mood normal.         Behavior: Behavior normal.          **Please Note: This note may have been constructed using a voice recognition system**     Thank you for the opportunity to participate in the care of this patient.   SHAYNE Redding

## 2025-01-21 NOTE — ASSESSMENT & PLAN NOTE
BP controlled  No longer on amlodipine 2.5 qd  Continue on Losartan 25 mg daily and metoprolol 25 mg daily

## 2025-01-22 ENCOUNTER — OFFICE VISIT (OUTPATIENT)
Dept: CARDIOLOGY CLINIC | Facility: CLINIC | Age: 75
End: 2025-01-22
Payer: MEDICARE

## 2025-01-22 VITALS
SYSTOLIC BLOOD PRESSURE: 120 MMHG | WEIGHT: 151.2 LBS | OXYGEN SATURATION: 98 % | HEART RATE: 65 BPM | BODY MASS INDEX: 22.91 KG/M2 | HEIGHT: 68 IN | RESPIRATION RATE: 18 BRPM | DIASTOLIC BLOOD PRESSURE: 60 MMHG

## 2025-01-22 DIAGNOSIS — Z95.5 S/P PRIMARY ANGIOPLASTY WITH CORONARY STENT: ICD-10-CM

## 2025-01-22 DIAGNOSIS — I25.10 CORONARY ARTERY CALCIFICATION SEEN ON CT SCAN: ICD-10-CM

## 2025-01-22 DIAGNOSIS — E78.5 HYPERLIPIDEMIA, UNSPECIFIED HYPERLIPIDEMIA TYPE: ICD-10-CM

## 2025-01-22 DIAGNOSIS — I25.10 CORONARY ARTERY DISEASE INVOLVING NATIVE CORONARY ARTERY OF NATIVE HEART WITHOUT ANGINA PECTORIS: ICD-10-CM

## 2025-01-22 DIAGNOSIS — Z82.49 FAMILY HISTORY OF EARLY CAD: ICD-10-CM

## 2025-01-22 DIAGNOSIS — I10 BENIGN ESSENTIAL HYPERTENSION: Primary | ICD-10-CM

## 2025-01-22 PROCEDURE — 99214 OFFICE O/P EST MOD 30 MIN: CPT

## 2025-01-22 RX ORDER — AMPICILLIN TRIHYDRATE 250 MG
CAPSULE ORAL
COMMUNITY

## 2025-01-22 RX ORDER — GINSENG 100 MG
CAPSULE ORAL
COMMUNITY

## 2025-01-22 RX ORDER — METOPROLOL SUCCINATE 25 MG/1
25 TABLET, EXTENDED RELEASE ORAL DAILY
Qty: 90 TABLET | Refills: 3 | Status: SHIPPED | OUTPATIENT
Start: 2025-01-22

## 2025-01-22 RX ORDER — ATORVASTATIN CALCIUM 80 MG/1
80 TABLET, FILM COATED ORAL EVERY EVENING
COMMUNITY
Start: 2025-01-07 | End: 2025-01-22 | Stop reason: SDUPTHER

## 2025-01-22 RX ORDER — LOSARTAN POTASSIUM 25 MG/1
25 TABLET ORAL DAILY
COMMUNITY
End: 2025-01-22 | Stop reason: SDUPTHER

## 2025-01-22 RX ORDER — CLOPIDOGREL BISULFATE 75 MG/1
75 TABLET ORAL DAILY
Qty: 90 TABLET | Refills: 3 | Status: SHIPPED | OUTPATIENT
Start: 2025-01-22

## 2025-01-22 RX ORDER — ATORVASTATIN CALCIUM 80 MG/1
80 TABLET, FILM COATED ORAL EVERY EVENING
Qty: 90 TABLET | Refills: 3 | Status: SHIPPED | OUTPATIENT
Start: 2025-01-22

## 2025-01-22 RX ORDER — LOSARTAN POTASSIUM 25 MG/1
25 TABLET ORAL DAILY
Qty: 90 TABLET | Refills: 2 | Status: SHIPPED | OUTPATIENT
Start: 2025-01-22

## 2025-01-22 RX ORDER — CLOPIDOGREL BISULFATE 75 MG/1
75 TABLET ORAL DAILY
COMMUNITY
End: 2025-01-22 | Stop reason: SDUPTHER

## 2025-01-22 RX ORDER — METOPROLOL SUCCINATE 25 MG/1
25 TABLET, EXTENDED RELEASE ORAL DAILY
COMMUNITY
End: 2025-01-22 | Stop reason: SDUPTHER

## 2025-01-22 RX ORDER — NITROGLYCERIN 0.4 MG/1
0.4 TABLET SUBLINGUAL
COMMUNITY
Start: 2025-01-06

## 2025-01-22 NOTE — ASSESSMENT & PLAN NOTE
S/P PCI with PORTIA stent to RCA  Right groin site check with  no hematoma, no bruises, no bruit. Pulses are palpable

## 2025-01-22 NOTE — ASSESSMENT & PLAN NOTE
Recent PORTIA placement  to RCA 1/5/25  Continue on aspirin+plavix, statin and BB  Denies any angina or anginal equivalent  Has Cardiac rehab eval on 1/27

## 2025-01-22 NOTE — PATIENT INSTRUCTIONS
Complete cardiac rehab  Complete lipid panel in 3 months  Continue current medications  Please call with questions and concerns

## 2025-01-27 ENCOUNTER — CLINICAL SUPPORT (OUTPATIENT)
Dept: CARDIAC REHAB | Facility: CLINIC | Age: 75
End: 2025-01-27
Payer: MEDICARE

## 2025-01-27 DIAGNOSIS — I21.4 NSTEMI (NON-ST ELEVATED MYOCARDIAL INFARCTION) (HCC): Primary | ICD-10-CM

## 2025-01-27 NOTE — PROGRESS NOTES
CARDIAC REHABILITATION   ASSESSMENT AND INDIVIDUALIZED TREATMENT PLAN  INITIAL           Today's date: 2025   # of Exercise Sessions Completed: 1- initial eval  Patient name: Shaun Mohr      : 1950  Age: 74 y.o.       MRN: [id number]  Referring Physician: Gavin Hutchison MD  Cardiologist: Norberto Pichardo MD   Provider: Eliseo  Clinician: Leydi Choe MS, CEP, EPC        Treatment is tailored to this patient's individual needs.  The ITP was reviewed with the patient and all questions were answered to their satisfaction.  Additional ITP documentation can be found electronically including daily and monthly exercise summaries, daily session notes with ECG summaries, education notes, daily medication reconciliation, and daily physician supervision.      Comments: feeling much better since getting the stent placed. No LEDEZMA or chest pressure        Dx:   Encounter Diagnosis   Name Primary?    NSTEMI (non-ST elevated myocardial infarction) (HCC)        Description of Diagnosis: noted SOB with daily walks and associated right sided chest pressure. NSTEMI s/p CI/PORTIA to RCA  Date of onset: 1/3/25  Other Cardiac History: family hx of premature CAD        ASSESSMENT    Medical History:   Past Medical History:   Diagnosis Date    Hernia, inguinal, right 2021    Hypertension     Irregular heart beat     Lung nodule        Family History:  Family History   Problem Relation Age of Onset    Diabetes Mother     Heart attack Father        Allergies:   Patient has no known allergies.    Current Medications:   Current Outpatient Medications   Medication Sig Dispense Refill    Ascorbic Acid (vitamin C) 1000 MG tablet Take 1,000 mg by mouth daily      aspirin 81 mg chewable tablet Chew 81 mg daily at bedtime      atorvastatin (LIPITOR) 80 mg tablet Take 1 tablet (80 mg total) by mouth every evening 90 tablet 3    Cholecalciferol (Vitamin D-3) 25 MCG (1000 UT) CAPS Take 1 capsule by mouth daily       Cinnamon 500 MG capsule Take by mouth      clopidogrel (PLAVIX) 75 mg tablet Take 1 tablet (75 mg total) by mouth daily 90 tablet 3    Diclofenac Sodium (VOLTAREN) 1 % Apply 2 g topically if needed (Patient not taking: Reported on 7/10/2024)      diclofenac sodium (VOLTAREN) 50 mg EC tablet Take 50 mg by mouth 2 (two) times a day      finasteride (PROSCAR) 5 mg tablet TAKE 1/2 TABLET(2.5 MG) BY MOUTH DAILY AT BEDTIME 45 tablet 1    gabapentin (Neurontin) 300 mg capsule Take 1 capsule (300 mg total) by mouth 3 (three) times a day (Patient taking differently: Take 100 mg by mouth 2 (two) times a day) 90 capsule 2    losartan (COZAAR) 25 mg tablet Take 1 tablet (25 mg total) by mouth daily 90 tablet 2    methylPREDNISolone 4 MG tablet therapy pack Use as directed on package 1 each 0    metoprolol succinate (TOPROL-XL) 25 mg 24 hr tablet Take 1 tablet (25 mg total) by mouth daily 90 tablet 3    Multiple Vitamin (MULTIVITAMIN ADULT PO) Take 1 tablet by mouth daily      nitroglycerin (NITROSTAT) 0.4 mg SL tablet Place 0.4 mg under the tongue every 5 (five) minutes as needed      pantoprazole (PROTONIX) 20 mg tablet Take 1 tablet (20 mg total) by mouth daily before breakfast 30 tablet 5    Turmeric 400 MG CAPS Take 1 capsule by mouth daily      Zinc 50 MG TABS Take by mouth       No current facility-administered medications for this visit.       Medication compliance: Yes   Comments: Pt reports to be compliant with medications    Physical Limitations: sciatic pain    Fall Risk: Low   Comments: Ambulates with a steady gait with no assist device    Cultural needs: none      CAD Risk Factors:  Cholesterol: Yes  HTN: Yes  DM: Pre-diabetes  Obesity: No   Inactivity: No      EXERCISE ASSESSMENT:     Initial Fitness Assessment: Submaximal TM ETT:  Resting:  BP: 110/66  HR: 60, Exercise:  BP: 166/68  HR: 107, METs:  4.3, and Test terminated at:  RPE 6      ECG INTERPRETATION:  NSR with freq PACs and occ PVCs    Current Functional  Status:  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  Recreation/Physical Activity: shooting, rental properties, and house in [place]  ADL’s:Capable of performing light to moderate ADLs  [place]: No limitations  Home exercise:  walks dog for 1-1.5 mile/day , using 10 lb weights   Other Comments: has TM at home, getting stepper- not currently using d/t basement area being redone      SMART Exercise Goals:   10% improvement in functional capacity based on max METs achieved in initial fitness assessment  improved DASI score by 10%  increased exercise capacity by 40% based on peak METs tolerated in cardiac rehab exercise session  maintain > 150 minutes per week of moderate intensity exercise    Patient Specific EXERCISE GOALS:       Increase strength   Increase stamina  Return to prior activities    Functional Capacity Screening Tool:  [facility] Activity Status Index:  9.89 METs      PSYCHOSOCIAL ASSESSMENT:    Date of last Assessment:  1/27/25  Depression screening:  PHQ-9 = 0    Interpretation:  0 =No Depression  Anxiety screening:  MEHRDAD-7 = 0    Interpretation: 0-4  = Not anxious    Pt self-report of depression and anxiety   Patient reports they are coping well with good social support and denies depression or anxiety    Self-reported stress level:  3   Stressors:  anxious to get back to all of his activities   Stress Management Tools: practice Relaxation Techniques, exercise, keep a positive mindset, enjoy a hobby, and spend time with family    SMART Psychosocial Goals:     Physical Fitness in [facility] Score < 3, Pain in [facility] Score < 3, and Change in Health in [facility] Score < 3     Patient Specific PSYCHOCOSOCIAL GOALS:    Return to social activities  Decrease stress level    Quality of Life Screen:  (Higher score indicates disease impact on QOL)  [facility] COOP score: 17/45     Social Support:   significant other  Community/Social Activities:      Psychosocial Assessment as it relates to  "rehabilitation:   Patient denies issues with his/her family or home life that may affect their rehabilitation efforts.       NUTRITION ASSESSMENT:    Initial Weight:  148.8  Current Weight:     Height:   Ht Readings from Last 1 Encounters:   01/22/25 5' 8\" (1.727 m)       Rate Your Plate Score: 58/81    Diabetes: Pre-diabetes  A1c: 6.5    last measured: 1/4/25    Lipid management: Discussed diet and lipid management and Last lipid profile 1/4/25  Chol 149  TRG 76  HDL 42  LDL 92    Current Dietary Habits:  Salad every night  Fruits and veggies  Fish 2x/week  Lots of chicken  Rare red meat  Does have cold cut sandwiches   Ice cream twice/week    SMART Nutrition Goals:   Improved Rate Your Plate score  >64, eat 6 or more servings of grain products per day, eat 5 or more servings of fruits and vegetables a day, eat 2 or more servings of low fat milk or yogurt a day, eat no more than 6oz of meat per day, dine out less than once per week or choose low fat restaurant meals, choose lean beef or rarely eat beef, rarely eat processed meats or eat low fat processed meats, eat meatless meals twice a week or more, choose 1% or skim milk, rarely eat cheese or choose reduced fat or skim, rarely eat frozen desserts or choose fruit or fat-free sweets, Do not cook with oil, butter or margarine, choose light or fat-free salad dressings or traore, choose healthy snacks such as fruit, pretzels, and low fat crackers, choose healthy desserts and sweets such as gwyn food cake or  fruit, never add salt to food when cooking or at the table, choose low sodium canned, frozen/packaged foods or rarely/never eat, and choose low sugar desserts and sweets    Patient Specific NUTRITION GOALS:     1. Reduce sweets   2. Decrease cold cuts   3. Increase meatless meals    Drug/Alcohol Use:   Yes, drinks wine      OTHER CORE COMPONENT ASSESSMENT:    Tobacco Use:     Pt quit 1979   and has abstained    Anginal Symptoms:  chest pressure and LEDEZMA   NTG use: " Understands proper use, Reviewed Proper use, and Pt has not used NTG since event    SMART Goals:   consistent, controlled resting BP < 130/80 and medication compliance    Patient Specific CORE COMPONENT GOALS:    Monitor BP  Carry NTG      INDIVIDUALIZED TREATMENT PLAN      EXERCISE GOALS and PLAN      Progress toward Exercise goals:   Reviewed Pt goals and determined plan of care    Exercise Plan:    education on home exercise guidelines, home exercise 30+ mins 2 days opposite CR, and Group class: Risk Factors for Heart Disease    The patient was counseled on exercise guidelines to achieve a minimum of 150 mins/wk of moderate intensity (RPE 4-6)   exercise and encouraged to add 1-2 days of exercise on opposite days of cardiac rehab as tolerated.       PHYSICIAN PRESCRIBED EXERCISE:    Current Aerobic Exercise Prescription:      Frequency: 3 days/week   Supplement with home exercise 2+ days/wk as tolerated       Minutes: 20 - 40         METS: 3-4            HR: 50-85% HRR:  104-133   RPE: 4-6         Modalities: Treadmill, Airdyne bike, Lifecycle, and Rower     Exercise workloads will be progressed gradually as tolerated, within limits of patient's ability, and according to the patient's   response to the exercise program.      Aerobic Exercise Prescription Plan for Progression   Frequency: 3 days/week of cardiac rehab       Supplement with home exercise 2+ days/wk as tolerated    Minutes: 40       >150 mins/wk of moderate intensity exercise   METS: 3.5-5   HR: 50-85% HRR:  104-133      RPE: 4-6   Modalities: Treadmill, Airdyne bike, Lifecycle, and Elliptical    Strength trainin-3 days / week  12-15 repetitions  1-2 sets per modality    Modalities: Chest Press, Pull Downs, Arm Curl, Seated Row, and Sit to Stands    Home Exercise:  walking dog 1-1.5 miles/day and using 10 lb free weights at home    Exercise Education: benefit of exercise for CAD risk factors, home exercise guidelines, AHA guidelines to achieve  >150 mins/wk of moderate exercise, and RPE scale     Readiness to change: Preparation:  (Getting ready to change)       NUTRITION GOALS AND PLAN      Nutritional   Reviewed patient's Rate your Plate. Discussed key elements of heart healthy eating. Reviewed patient goals for dietary modifications and their clinical implications.  Reviewed most recent lipid profile.     Patient's progress toward Nutrition goals:    Reviewed Pt goals and determined plan of care      Nutrition Plan:   group class: Reading Food Labels, group Class: Heart Healthy Eating, increase intake of whole grains, increase daily intake of fruits and vegetables, increase daily intake of low fat dairy, limit meat intake to less than 6oz per day, choose healthy meals while dining out, choose lean red meat, reduce intake and /or  rarely eat processed meats , eat more meatless meals, drink/use 1%  low fat or skim  milk, eat reduced-fat or part-skim cheese or rarely eat, rarely eat frozen desserts, cook without fats or oils, use light or fat-free salad dressings and mayonnaise, eat healthy snacks like fruit, pretzels, and low fat crackers, choose desserts such as fruit, gwyn food cake, low-fat or fat-free sweets, and choose low sugar desserts and sweets    Measurable goals were based Rate Your Plate Dietary Self-Assessment. These are the areas in which the patient could score higher on the assessment.  Goals include recommendations for a heart healthy diet based on American Heart Association.    Nutrition Education:   heart healthy eating principles  maintaining hydration  nutrition for  lipid management    Readiness to change: Preparation:  (Getting ready to change)       PSYCHOSOCIAL GOALS AND PLAN    Psychosocial Assessment as it relates to rehabilitation:   Patient denies issues with his/her family or home life that may affect their rehabilitation efforts.     Patient's progress toward Psychosocial goals:    Reviewed Pt goals and determined plan of  care    Psychosocial Intervention/plan:   Class: Stress and Your Health, Class: Relaxation, Practice relaxation techniques, Exercise, Keep a positive mindset, and Enjoy family    Psychosocial Education: benefits of a positive support system and stress management techniques    Information to utilize [employer] was provided as well as contact information for counseling through  Behavioral Health and group psychotherapy groups available.    Readiness to change: Preparation:  (Getting ready to change)       OTHER CORE COMPONENTS GOALS and PLAN  Blood Pressure  Restin/66  Exercise: 166/68  Recovery: 130/70    Blood Pressure will be monitored throughout the program and cardiologist will be notified of elevated trends.    Pt will be encouraged to monitor home BP if advised by cardiologist.    Tobacco Plan:   Pt quit 1979 and has abstained since quitting.      Progress toward Core Component goals:   Reviewed Pt goals and determined plan of care    Other Core Components Intervention:   group class: Understanding Heart Disease, group class: Common Heart Medications, medication compliance, avoid processed foods, engage in regular exercise, eliminate salt shaker at the table, check labels for sodium content, and monitor home BP    Group and Individual Education:  understanding high blood pressure and it's relationship to CAD, components of blood pressure management, and proper use of sublingual NTG    Readiness to change: Preparation:  (Getting ready to change)

## 2025-01-31 ENCOUNTER — CLINICAL SUPPORT (OUTPATIENT)
Dept: CARDIAC REHAB | Facility: CLINIC | Age: 75
End: 2025-01-31
Payer: MEDICARE

## 2025-01-31 DIAGNOSIS — I21.4 NSTEMI (NON-ST ELEVATED MYOCARDIAL INFARCTION) (HCC): Primary | ICD-10-CM

## 2025-01-31 PROCEDURE — 93798 PHYS/QHP OP CAR RHAB W/ECG: CPT

## 2025-02-04 ENCOUNTER — CLINICAL SUPPORT (OUTPATIENT)
Dept: CARDIAC REHAB | Facility: CLINIC | Age: 75
End: 2025-02-04
Payer: MEDICARE

## 2025-02-04 DIAGNOSIS — I21.4 NSTEMI (NON-ST ELEVATED MYOCARDIAL INFARCTION) (HCC): Primary | ICD-10-CM

## 2025-02-04 PROCEDURE — 93798 PHYS/QHP OP CAR RHAB W/ECG: CPT

## 2025-02-06 ENCOUNTER — CLINICAL SUPPORT (OUTPATIENT)
Dept: CARDIAC REHAB | Facility: CLINIC | Age: 75
End: 2025-02-06
Payer: MEDICARE

## 2025-02-06 DIAGNOSIS — I21.4 NSTEMI (NON-ST ELEVATED MYOCARDIAL INFARCTION) (HCC): Primary | ICD-10-CM

## 2025-02-06 PROCEDURE — 93798 PHYS/QHP OP CAR RHAB W/ECG: CPT

## 2025-02-07 ENCOUNTER — CLINICAL SUPPORT (OUTPATIENT)
Dept: CARDIAC REHAB | Facility: CLINIC | Age: 75
End: 2025-02-07
Payer: MEDICARE

## 2025-02-07 DIAGNOSIS — I21.4 NSTEMI (NON-ST ELEVATED MYOCARDIAL INFARCTION) (HCC): Primary | ICD-10-CM

## 2025-02-07 PROCEDURE — 93798 PHYS/QHP OP CAR RHAB W/ECG: CPT

## 2025-02-11 ENCOUNTER — CLINICAL SUPPORT (OUTPATIENT)
Dept: CARDIAC REHAB | Facility: CLINIC | Age: 75
End: 2025-02-11
Payer: MEDICARE

## 2025-02-11 DIAGNOSIS — I21.4 NSTEMI (NON-ST ELEVATED MYOCARDIAL INFARCTION) (HCC): Primary | ICD-10-CM

## 2025-02-11 PROCEDURE — 93798 PHYS/QHP OP CAR RHAB W/ECG: CPT

## 2025-02-13 ENCOUNTER — CLINICAL SUPPORT (OUTPATIENT)
Dept: CARDIAC REHAB | Facility: CLINIC | Age: 75
End: 2025-02-13
Payer: MEDICARE

## 2025-02-13 DIAGNOSIS — I21.4 NSTEMI (NON-ST ELEVATED MYOCARDIAL INFARCTION) (HCC): Primary | ICD-10-CM

## 2025-02-13 PROCEDURE — 93798 PHYS/QHP OP CAR RHAB W/ECG: CPT

## 2025-02-14 ENCOUNTER — CLINICAL SUPPORT (OUTPATIENT)
Dept: CARDIAC REHAB | Facility: CLINIC | Age: 75
End: 2025-02-14
Payer: MEDICARE

## 2025-02-14 DIAGNOSIS — I21.4 NSTEMI (NON-ST ELEVATED MYOCARDIAL INFARCTION) (HCC): Primary | ICD-10-CM

## 2025-02-14 PROCEDURE — 93798 PHYS/QHP OP CAR RHAB W/ECG: CPT

## 2025-02-18 ENCOUNTER — CLINICAL SUPPORT (OUTPATIENT)
Dept: CARDIAC REHAB | Facility: CLINIC | Age: 75
End: 2025-02-18
Payer: MEDICARE

## 2025-02-18 DIAGNOSIS — I21.4 NSTEMI (NON-ST ELEVATED MYOCARDIAL INFARCTION) (HCC): Primary | ICD-10-CM

## 2025-02-18 PROCEDURE — 93798 PHYS/QHP OP CAR RHAB W/ECG: CPT

## 2025-02-20 ENCOUNTER — CLINICAL SUPPORT (OUTPATIENT)
Dept: CARDIAC REHAB | Facility: CLINIC | Age: 75
End: 2025-02-20
Payer: MEDICARE

## 2025-02-20 DIAGNOSIS — I21.4 NSTEMI (NON-ST ELEVATED MYOCARDIAL INFARCTION) (HCC): Primary | ICD-10-CM

## 2025-02-20 PROCEDURE — 93798 PHYS/QHP OP CAR RHAB W/ECG: CPT

## 2025-02-21 ENCOUNTER — CLINICAL SUPPORT (OUTPATIENT)
Dept: CARDIAC REHAB | Facility: CLINIC | Age: 75
End: 2025-02-21
Payer: MEDICARE

## 2025-02-21 DIAGNOSIS — I21.4 NSTEMI (NON-ST ELEVATED MYOCARDIAL INFARCTION) (HCC): Primary | ICD-10-CM

## 2025-02-21 PROCEDURE — 93798 PHYS/QHP OP CAR RHAB W/ECG: CPT

## 2025-02-25 ENCOUNTER — CLINICAL SUPPORT (OUTPATIENT)
Dept: CARDIAC REHAB | Facility: CLINIC | Age: 75
End: 2025-02-25
Payer: MEDICARE

## 2025-02-25 DIAGNOSIS — I21.4 NSTEMI (NON-ST ELEVATED MYOCARDIAL INFARCTION) (HCC): Primary | ICD-10-CM

## 2025-02-25 PROCEDURE — 93798 PHYS/QHP OP CAR RHAB W/ECG: CPT

## 2025-02-25 NOTE — PROGRESS NOTES
CARDIAC REHABILITATION   ASSESSMENT AND INDIVIDUALIZED TREATMENT PLAN  30 DAY          Today's date: 2025   # of Exercise Sessions Completed: 12  Patient name: Shaun Mohr      : 1950  Age: 74 y.o.       MRN: [id number]  Referring Physician: Gavin Hutchison MD  Cardiologist: Norberto Pichardo MD   Provider: Eliseo  Clinician: Leydi Choe MS, CEP, EPC        Treatment is tailored to this patient's individual needs.  The ITP was reviewed with the patient and all questions were answered to their satisfaction.  Additional ITP documentation can be found electronically including daily and monthly exercise summaries, daily session notes with ECG summaries, education notes, daily medication reconciliation, and daily physician supervision.      Comments: 25: SUMMARY 2025    Resting BP  100/62 - 140/70,  HR 57 - 77  Exercise /64- 162/62.  HR 87 - 116  Exercise session details:  38 minutes,  3.3-3.9 METs  Telemetry:  sinus pedro luis- NSR with freq PACs and occ PVCs  Symptoms: none  Home exercise/ADLs: completing ADLs without concerns. Walking dog but encouraged to add walking by himself. Doing free weights 2-3 days/week   Patient's subjective report of progress: patient has been feeling good overall     25: feeling much better since getting the stent placed. No LEDEZMA or chest pressure        Dx:   Encounter Diagnosis   Name Primary?    NSTEMI (non-ST elevated myocardial infarction) (HCC) Yes       Description of Diagnosis: noted SOB with daily walks and associated right sided chest pressure. NSTEMI s/p CI/PORTIA to RCA  Date of onset: 1/3/25  Other Cardiac History: family hx of premature CAD        ASSESSMENT    Medical History:   Past Medical History:   Diagnosis Date    Hernia, inguinal, right 2021    Hypertension     Irregular heart beat     Lung nodule        Family History:  Family History   Problem Relation Age of Onset    Diabetes Mother     Heart attack Father         Allergies:   Patient has no known allergies.    Current Medications:   Current Outpatient Medications   Medication Sig Dispense Refill    Ascorbic Acid (vitamin C) 1000 MG tablet Take 1,000 mg by mouth daily      aspirin 81 mg chewable tablet Chew 81 mg daily at bedtime      atorvastatin (LIPITOR) 80 mg tablet Take 1 tablet (80 mg total) by mouth every evening 90 tablet 3    Cholecalciferol (Vitamin D-3) 25 MCG (1000 UT) CAPS Take 1 capsule by mouth daily      Cinnamon 500 MG capsule Take by mouth      clopidogrel (PLAVIX) 75 mg tablet Take 1 tablet (75 mg total) by mouth daily 90 tablet 3    Diclofenac Sodium (VOLTAREN) 1 % Apply 2 g topically if needed (Patient not taking: Reported on 7/10/2024)      diclofenac sodium (VOLTAREN) 50 mg EC tablet Take 50 mg by mouth 2 (two) times a day      finasteride (PROSCAR) 5 mg tablet TAKE 1/2 TABLET(2.5 MG) BY MOUTH DAILY AT BEDTIME 45 tablet 1    gabapentin (Neurontin) 300 mg capsule Take 1 capsule (300 mg total) by mouth 3 (three) times a day (Patient taking differently: Take 100 mg by mouth 2 (two) times a day) 90 capsule 2    losartan (COZAAR) 25 mg tablet Take 1 tablet (25 mg total) by mouth daily 90 tablet 2    methylPREDNISolone 4 MG tablet therapy pack Use as directed on package 1 each 0    metoprolol succinate (TOPROL-XL) 25 mg 24 hr tablet Take 1 tablet (25 mg total) by mouth daily 90 tablet 3    Multiple Vitamin (MULTIVITAMIN ADULT PO) Take 1 tablet by mouth daily      nitroglycerin (NITROSTAT) 0.4 mg SL tablet Place 0.4 mg under the tongue every 5 (five) minutes as needed      pantoprazole (PROTONIX) 20 mg tablet Take 1 tablet (20 mg total) by mouth daily before breakfast 30 tablet 5    Turmeric 400 MG CAPS Take 1 capsule by mouth daily      Zinc 50 MG TABS Take by mouth       No current facility-administered medications for this visit.       Medication compliance: Yes   Comments: Pt reports to be compliant with medications    Physical Limitations: sciatic  pain    Fall Risk: Low   Comments: Ambulates with a steady gait with no assist device    Cultural needs: none      CAD Risk Factors:  Cholesterol: Yes  HTN: Yes  DM: Pre-diabetes  Obesity: No   Inactivity: No      EXERCISE ASSESSMENT:     Initial Fitness Assessment: Submaximal TM ETT:  Resting:  BP: 110/66  HR: 60, Exercise:  BP: 166/68  HR: 107, METs:  4.3, and Test terminated at:  RPE 6      ECG INTERPRETATION:  sinus pedro luis- NSR with freq PACs and occ PVCs    Current Functional Status:  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  Recreation/Physical Activity: shooting, [employer] properties, and house in [place]  ADL’s:Capable of performing light to moderate ADLs  [place]: No limitations  Home exercise:  walks dog for 1-1.5 mile/day , using 10 lb weights 2-3 days/week  Other Comments: has TM at home, getting stepper- not currently using d/t basement area being redone      SMART Exercise Goals:   10% improvement in functional capacity based on max METs achieved in initial fitness assessment  improved DASI score by 10%  increased exercise capacity by 40% based on peak METs tolerated in cardiac rehab exercise session  maintain > 150 minutes per week of moderate intensity exercise    Patient Specific EXERCISE GOALS:       Increase strength- goal met   Increase stamina- goal met  Return to prior activities    Functional Capacity Screening Tool:  [facility] Activity Status Index:  9.89 METs      PSYCHOSOCIAL ASSESSMENT:    Date of last Assessment:  1/27/25  Depression screening:  PHQ-9 = 0    Interpretation:  0 =No Depression  Anxiety screening:  MEHRDAD-7 = 0    Interpretation: 0-4  = Not anxious    Pt self-report of depression and anxiety   Patient reports they are coping well with good social support and denies depression or anxiety    Self-reported stress level:  3   Stressors:  anxious to get back to all of his activities   Stress Management Tools: practice Relaxation Techniques, exercise, keep a positive mindset, enjoy a hobby, and  "spend time with family    SMART Psychosocial Goals:     Physical Fitness in [facility] Score < 3, Pain in [facility] Score < 3, and Change in Health in [facility] Score < 3     Patient Specific PSYCHOCOSOCIAL GOALS:    Return to social activities  Decrease stress level    Quality of Life Screen:  (Higher score indicates disease impact on QOL)  [facility] COOP score: 17/45     Social Support:   significant other  Community/Social Activities:      Psychosocial Assessment as it relates to rehabilitation:   Patient denies issues with his/her family or home life that may affect their rehabilitation efforts.       NUTRITION ASSESSMENT:    Initial Weight:  148.8  Current Weight: 149    Height:   Ht Readings from Last 1 Encounters:   01/22/25 5' 8\" (1.727 m)       Rate Your Plate Score: 58/81    Diabetes: Pre-diabetes  A1c: 6.5    last measured: 1/4/25    Lipid management: Discussed diet and lipid management and Last lipid profile 1/4/25  Chol 149  TRG 76  HDL 42  LDL 92    Current Dietary Habits:  Salad every night  Fruits and veggies  Fish 2x/week  Lots of chicken  Rare red meat  Does have cold cut sandwiches   Ice cream twice/week    SMART Nutrition Goals:   Improved Rate Your Plate score  >64, eat 6 or more servings of grain products per day, eat 5 or more servings of fruits and vegetables a day, eat 2 or more servings of low fat milk or yogurt a day, eat no more than 6oz of meat per day, dine out less than once per week or choose low fat restaurant meals, choose lean beef or rarely eat beef, rarely eat processed meats or eat low fat processed meats, eat meatless meals twice a week or more, choose 1% or skim milk, rarely eat cheese or choose reduced fat or skim, rarely eat frozen desserts or choose fruit or fat-free sweets, Do not cook with oil, butter or margarine, choose light or fat-free salad dressings or traore, choose healthy snacks such as fruit, pretzels, and low fat crackers, choose healthy desserts " and sweets such as gwyn food cake or  fruit, never add salt to food when cooking or at the table, choose low sodium canned, frozen/packaged foods or rarely/never eat, and choose low sugar desserts and sweets    Patient Specific NUTRITION GOALS:     1. Reduce sweets   2. Decrease cold cuts   3. Increase meatless meals    Drug/Alcohol Use:   Yes, drinks wine      OTHER CORE COMPONENT ASSESSMENT:    Tobacco Use:     Pt quit 1979   and has abstained    Anginal Symptoms:  chest pressure and LEDEZMA   NTG use: Understands proper use, Reviewed Proper use, and Pt has not used NTG since event    SMART Goals:   consistent, controlled resting BP < 130/80 and medication compliance    Patient Specific CORE COMPONENT GOALS:    Monitor BP  Carry NTG      INDIVIDUALIZED TREATMENT PLAN      EXERCISE GOALS and PLAN      Progress toward Exercise goals:   Pt is progressing and showing improvement  toward the following goals:  tolerating exercise at 3.3-3.9 METs, added resistance training in rehab, doing free weights at home 2-3 days/week.  , Pt has not made progress toward the following goals: cardiovascular home exercise. , Will continue to educate and progress as tolerated. Encouraged patient to walk 1-2 days/week without his dog.     Exercise Plan:    education on home exercise guidelines, home exercise 30+ mins 2 days opposite CR, and Group class: Risk Factors for Heart Disease    The patient was counseled on exercise guidelines to achieve a minimum of 150 mins/wk of moderate intensity (RPE 4-6)   exercise and encouraged to add 1-2 days of exercise on opposite days of cardiac rehab as tolerated.       PHYSICIAN PRESCRIBED EXERCISE:    Current Aerobic Exercise Prescription:      Frequency: 3 days/week   Supplement with home exercise 2+ days/wk as tolerated       Minutes: 38         METS: 3.3-3.9             HR:     RPE: 4-6         Modalities: Treadmill, Airdyne bike, Lifecycle, and Rower     Exercise workloads will be progressed  gradually as tolerated, within limits of patient's ability, and according to the patient's   response to the exercise program.      Aerobic Exercise Prescription Plan for Progression   Frequency: 3 days/week of cardiac rehab       Supplement with home exercise 2+ days/wk as tolerated    Minutes: 40       >150 mins/wk of moderate intensity exercise   METS: 3.5-5   HR: 50-85% HRR:  104-133      RPE: 4-6   Modalities: Treadmill, Airdyne bike, Lifecycle, and Elliptical    Strength trainin-3 days / week  12-15 repetitions  1-2 sets per modality    Modalities: Chest Press, Pull Downs, Arm Curl, Seated Row, and Sit to Stands    Home Exercise:  walking dog 1-1.5 miles/day and using 10 lb free weights at home 2-3 days/week    Exercise Education: benefit of exercise for CAD risk factors, home exercise guidelines, AHA guidelines to achieve >150 mins/wk of moderate exercise, and RPE scale     Readiness to change: Action:  (Changing behavior)      NUTRITION GOALS AND PLAN      Nutritional   Reviewed patient's Rate your Plate. Discussed key elements of heart healthy eating. Reviewed patient goals for dietary modifications and their clinical implications.  Reviewed most recent lipid profile.     Patient's progress toward Nutrition goals:    Pt is progressing and showing improvement  toward the following goals:  weight remains stable, making dietary changes that were dicussed in rehab.  , Will continue to educate and progress as tolerated.      Nutrition Plan:   increase intake of whole grains, increase daily intake of fruits and vegetables, limit meat intake to less than 6oz per day, choose healthy meals while dining out, choose lean red meat, reduce intake and /or  rarely eat processed meats , eat more meatless meals, drink/use 1%  low fat or skim  milk, eat reduced-fat or part-skim cheese or rarely eat, rarely eat frozen desserts, cook without fats or oils, use light or fat-free salad dressings and mayonnaise, eat healthy  snacks like fruit, pretzels, and low fat crackers, choose desserts such as fruit, gwyn food cake, low-fat or fat-free sweets, and choose low sugar desserts and sweets    Measurable goals were based Rate Your Plate Dietary Self-Assessment. These are the areas in which the patient could score higher on the assessment.  Goals include recommendations for a heart healthy diet based on American Heart Association.    Nutrition Education:   heart healthy eating principles  maintaining hydration  nutrition for  lipid management  group class: Heart Healthy Eating  group class:  Label Reading    Readiness to change: Action:  (Changing behavior)      PSYCHOSOCIAL GOALS AND PLAN    Psychosocial Assessment as it relates to rehabilitation:   Patient denies issues with his/her family or home life that may affect their rehabilitation efforts.     Patient's progress toward Psychosocial goals:    Pt is progressing and showing improvement  toward the following goals:  no concerns about emotional status, stress is manageable.      Psychosocial Intervention/plan:   Class: Stress and Your Health, Class: Relaxation, Practice relaxation techniques, Exercise, Keep a positive mindset, and Enjoy family    Psychosocial Education: benefits of a positive support system and stress management techniques    Information to utilize [employer] was provided as well as contact information for counseling through  Behavioral Health and group psychotherapy groups available.    Readiness to change: Action:  (Changing behavior)      OTHER CORE COMPONENTS GOALS and PLAN  Blood Pressure  Restin/62- 140/70  Exercise: 114/64- 162/62  Recovery: 104/62- 132/72    Blood Pressure will be monitored throughout the program and cardiologist will be notified of elevated trends.    Pt will be encouraged to monitor home BP if advised by cardiologist.    Tobacco Plan:   Pt quit  and has abstained since quitting.      Progress toward Core Component goals:   Pt  is progressing and showing improvement  toward the following goals:  BP normal with occ elevation, medication compliance, carrying NTG .  , Will continue to educate and progress as tolerated.    Other Core Components Intervention:   group class: Understanding Heart Disease, group class: Common Heart Medications, medication compliance, avoid processed foods, engage in regular exercise, eliminate salt shaker at the table, check labels for sodium content, and monitor home BP    Group and Individual Education:  understanding high blood pressure and it's relationship to CAD, components of blood pressure management, and proper use of sublingual NTG    Readiness to change: Action:  (Changing behavior)

## 2025-02-27 ENCOUNTER — CLINICAL SUPPORT (OUTPATIENT)
Dept: CARDIAC REHAB | Facility: CLINIC | Age: 75
End: 2025-02-27
Payer: MEDICARE

## 2025-02-27 DIAGNOSIS — I21.4 NSTEMI (NON-ST ELEVATED MYOCARDIAL INFARCTION) (HCC): Primary | ICD-10-CM

## 2025-02-27 PROCEDURE — 93798 PHYS/QHP OP CAR RHAB W/ECG: CPT

## 2025-02-27 NOTE — PROGRESS NOTES
Exercise session details    Pre-op tests (EKG) and labs (BMP) ordered for Kyphoplasty procedure scheduled for 9/30/2022

## 2025-02-28 ENCOUNTER — CLINICAL SUPPORT (OUTPATIENT)
Dept: CARDIAC REHAB | Facility: CLINIC | Age: 75
End: 2025-02-28
Payer: MEDICARE

## 2025-02-28 DIAGNOSIS — I21.4 NSTEMI (NON-ST ELEVATED MYOCARDIAL INFARCTION) (HCC): Primary | ICD-10-CM

## 2025-02-28 PROCEDURE — 93798 PHYS/QHP OP CAR RHAB W/ECG: CPT

## 2025-03-04 ENCOUNTER — CLINICAL SUPPORT (OUTPATIENT)
Dept: CARDIAC REHAB | Facility: CLINIC | Age: 75
End: 2025-03-04
Payer: MEDICARE

## 2025-03-04 DIAGNOSIS — I21.4 NSTEMI (NON-ST ELEVATED MYOCARDIAL INFARCTION) (HCC): Primary | ICD-10-CM

## 2025-03-04 PROCEDURE — 93798 PHYS/QHP OP CAR RHAB W/ECG: CPT

## 2025-03-06 ENCOUNTER — CLINICAL SUPPORT (OUTPATIENT)
Dept: CARDIAC REHAB | Facility: CLINIC | Age: 75
End: 2025-03-06
Payer: MEDICARE

## 2025-03-06 DIAGNOSIS — I21.4 NSTEMI (NON-ST ELEVATED MYOCARDIAL INFARCTION) (HCC): Primary | ICD-10-CM

## 2025-03-06 PROCEDURE — 93798 PHYS/QHP OP CAR RHAB W/ECG: CPT

## 2025-03-07 ENCOUNTER — CLINICAL SUPPORT (OUTPATIENT)
Dept: CARDIAC REHAB | Facility: CLINIC | Age: 75
End: 2025-03-07
Payer: MEDICARE

## 2025-03-07 DIAGNOSIS — I21.4 NSTEMI (NON-ST ELEVATED MYOCARDIAL INFARCTION) (HCC): Primary | ICD-10-CM

## 2025-03-07 PROCEDURE — 93798 PHYS/QHP OP CAR RHAB W/ECG: CPT

## 2025-03-11 ENCOUNTER — CLINICAL SUPPORT (OUTPATIENT)
Dept: CARDIAC REHAB | Facility: CLINIC | Age: 75
End: 2025-03-11
Payer: MEDICARE

## 2025-03-11 DIAGNOSIS — I21.4 NSTEMI (NON-ST ELEVATED MYOCARDIAL INFARCTION) (HCC): Primary | ICD-10-CM

## 2025-03-11 PROCEDURE — 93798 PHYS/QHP OP CAR RHAB W/ECG: CPT

## 2025-03-13 ENCOUNTER — CLINICAL SUPPORT (OUTPATIENT)
Dept: CARDIAC REHAB | Facility: CLINIC | Age: 75
End: 2025-03-13
Payer: MEDICARE

## 2025-03-13 DIAGNOSIS — I21.4 NSTEMI (NON-ST ELEVATED MYOCARDIAL INFARCTION) (HCC): Primary | ICD-10-CM

## 2025-03-13 PROCEDURE — 93798 PHYS/QHP OP CAR RHAB W/ECG: CPT

## 2025-03-14 ENCOUNTER — CLINICAL SUPPORT (OUTPATIENT)
Dept: CARDIAC REHAB | Facility: CLINIC | Age: 75
End: 2025-03-14
Payer: MEDICARE

## 2025-03-14 DIAGNOSIS — I21.4 NSTEMI (NON-ST ELEVATED MYOCARDIAL INFARCTION) (HCC): Primary | ICD-10-CM

## 2025-03-14 PROCEDURE — 93798 PHYS/QHP OP CAR RHAB W/ECG: CPT

## 2025-03-18 ENCOUNTER — CLINICAL SUPPORT (OUTPATIENT)
Dept: CARDIAC REHAB | Facility: CLINIC | Age: 75
End: 2025-03-18
Payer: MEDICARE

## 2025-03-18 DIAGNOSIS — I21.4 NSTEMI (NON-ST ELEVATED MYOCARDIAL INFARCTION) (HCC): Primary | ICD-10-CM

## 2025-03-18 PROCEDURE — 93798 PHYS/QHP OP CAR RHAB W/ECG: CPT

## 2025-03-20 ENCOUNTER — CLINICAL SUPPORT (OUTPATIENT)
Dept: CARDIAC REHAB | Facility: CLINIC | Age: 75
End: 2025-03-20
Payer: MEDICARE

## 2025-03-20 DIAGNOSIS — I21.4 NSTEMI (NON-ST ELEVATED MYOCARDIAL INFARCTION) (HCC): Primary | ICD-10-CM

## 2025-03-20 PROCEDURE — 93798 PHYS/QHP OP CAR RHAB W/ECG: CPT

## 2025-03-20 NOTE — H&P (VIEW-ONLY)
Assessment/Plan:    Diagnoses and all orders for this visit:    Hernia, inguinal, right  -     Ambulatory referral to General Surgery      Risks and benefits for a right inguinal hernia repair including the potential for spermatic cord injury, recurrence, or pain issues were discussed with him and he agrees to proceed  Subjective:      Patient ID: Shubham Linares is a 80 y o  male  Patient presents for right inguinal hernia consult  States he has had a bulge and dull pain RLQ for 2 months  Limits his activities  Tends to feel some achiness by the end of the day  Very comfortable in the morning upon awakening  Discomfort and lump seems to progress as the day goes on  Denies any urinary or bowel complaints      The following portions of the patient's history were reviewed and updated as appropriate:     He  has a past medical history of Hypertension and Lung nodule  He  has a past surgical history that includes Shoulder surgery (Right, 2018); Bunionectomy (Right); and Hand surgery (Right)  His family history includes Diabetes in his mother; Heart attack in his father  He  reports that he quit smoking about 42 years ago  He has never used smokeless tobacco  He reports previous alcohol use of about 6 0 - 7 0 standard drinks of alcohol per week  He reports that he does not use drugs    Current Outpatient Medications   Medication Sig Dispense Refill    amLODIPine (NORVASC) 5 mg tablet       amLODIPine-atorvastatin (CADUET) 5-10 MG per tablet Take 1 tablet by mouth daily      Ascorbic Acid (vitamin C) 1000 MG tablet Take 1,000 mg by mouth daily      aspirin 81 mg chewable tablet Chew 81 mg daily      Cholecalciferol (Vitamin D-3) 25 MCG (1000 UT) CAPS Take 1 capsule by mouth      diclofenac sodium (VOLTAREN) 1 % Apply 2 g topically 4 (four) times a day 1 Tube 3    diclofenac sodium (VOLTAREN) 50 mg EC tablet Take 100 mg by mouth 2 (two) times a day      finasteride (PROSCAR) 5 mg tablet Take 2 5 mg by mouth daily       montelukast (SINGULAIR) 10 mg tablet Take 10 mg by mouth daily at bedtime      Multiple Vitamin (MULTIVITAMIN ADULT PO) Take 1 tablet by mouth daily      rosuvastatin (CRESTOR) 10 MG tablet Take 10 mg by mouth daily      rosuvastatin (CRESTOR) 20 MG tablet       Turmeric 400 MG CAPS Take 1 capsule by mouth daily       No current facility-administered medications for this visit  He has No Known Allergies       Review of Systems   All other systems reviewed and are negative  Objective:      Ht 5' 8" (1 727 m)   Wt 70 8 kg (156 lb)   BMI 23 72 kg/m²          Physical Exam  Constitutional:       Appearance: Normal appearance  He is normal weight  HENT:      Head: Normocephalic  Mouth/Throat:      Mouth: Mucous membranes are moist    Eyes:      Extraocular Movements: Extraocular movements intact  Neck:      Musculoskeletal: Normal range of motion and neck supple  Cardiovascular:      Rate and Rhythm: Normal rate and regular rhythm  Pulmonary:      Effort: Pulmonary effort is normal       Breath sounds: Normal breath sounds  Abdominal:      General: Abdomen is flat  Bowel sounds are normal       Palpations: Abdomen is soft  Hernia: A hernia (Reducible right inguinal hernia  No left inguinal hernia) is present  Skin:     General: Skin is warm and dry  Neurological:      Mental Status: He is alert and oriented to person, place, and time     Psychiatric:         Mood and Affect: Mood normal  [Subsequent Evaluation] : a subsequent evaluation for [FreeTextEntry2] : ear clogging

## 2025-03-21 ENCOUNTER — CLINICAL SUPPORT (OUTPATIENT)
Dept: CARDIAC REHAB | Facility: CLINIC | Age: 75
End: 2025-03-21
Payer: MEDICARE

## 2025-03-21 DIAGNOSIS — I21.4 NSTEMI (NON-ST ELEVATED MYOCARDIAL INFARCTION) (HCC): Primary | ICD-10-CM

## 2025-03-21 PROCEDURE — 93798 PHYS/QHP OP CAR RHAB W/ECG: CPT

## 2025-03-25 ENCOUNTER — CLINICAL SUPPORT (OUTPATIENT)
Dept: CARDIAC REHAB | Facility: CLINIC | Age: 75
End: 2025-03-25
Payer: MEDICARE

## 2025-03-25 DIAGNOSIS — I21.4 NSTEMI (NON-ST ELEVATED MYOCARDIAL INFARCTION) (HCC): Primary | ICD-10-CM

## 2025-03-25 PROCEDURE — 93798 PHYS/QHP OP CAR RHAB W/ECG: CPT

## 2025-03-25 NOTE — PROGRESS NOTES
CARDIAC REHABILITATION   ASSESSMENT AND INDIVIDUALIZED TREATMENT PLAN  60 DAY          Today's date: 3/25/2025   # of Exercise Sessions Completed: 24  Patient name: Shaun Mohr      : 1950  Age: 75 y.o.       MRN: [id number]  Referring Physician: Gavin Hutchison MD  Cardiologist: Norberto Pichardo MD   Provider: Eliseo  Clinician: Domingo Alaniz MS, CEP, EPC        Treatment is tailored to this patient's individual needs.  The ITP was reviewed with the patient and all questions were answered to their satisfaction.  Additional ITP documentation can be found electronically including daily and monthly exercise summaries, daily session notes with ECG summaries, education notes, daily medication reconciliation, and daily physician supervision.      Comments:    3/25/2025 SUMMARY 2025    Resting BP  108/62 - 130/72,  HR 57 - 71  Exercise /60- 142/62.  HR 91 - 121  Exercise session details:  45-56 minutes,  3.6-3.8 METs  Telemetry:  NSR,occ PVC's,PAC's  Symptoms: none  Home exercise/ADLs: no formal exercise, walks dog 3/day for 20-30 min each time, strength trains 1-2 days/week  Patient's subjective report of progress: pt states he is feeling great with increased energy, no cardiac complaints   Clinical Comments: progressing exercise intensity well      25: SUMMARY     Resting BP  100/62 - 140/70,  HR 57 - 77  Exercise /64- 162/62.  HR 87 - 116  Exercise session details:  38 minutes,  3.3-3.9 METs  Telemetry:  sinus pedro luis- NSR with freq PACs and occ PVCs  Symptoms: none  Home exercise/ADLs: completing ADLs without concerns. Walking dog but encouraged to add walking by himself. Doing free weights 2-3 days/week   Patient's subjective report of progress: patient has been feeling good overall     25: feeling much better since getting the stent placed. No LEDEZMA or chest pressure        Dx:   Encounter Diagnosis   Name Primary?    NSTEMI (non-ST elevated myocardial infarction)  ([facility]) Yes       Description of Diagnosis: noted SOB with daily walks and associated right sided chest pressure. NSTEMI s/p CI/PORTIA to RCA  Date of onset: 1/3/25  Other Cardiac History: family hx of premature CAD        ASSESSMENT    Medical History:   Past Medical History:   Diagnosis Date    Hernia, inguinal, right 02/24/2021    Hypertension     Irregular heart beat     Lung nodule        Family History:  Family History   Problem Relation Age of Onset    Diabetes Mother     Heart attack Father        Allergies:   Patient has no known allergies.    Current Medications:   Current Outpatient Medications   Medication Sig Dispense Refill    Ascorbic Acid (vitamin C) 1000 MG tablet Take 1,000 mg by mouth daily      aspirin 81 mg chewable tablet Chew 81 mg daily at bedtime      atorvastatin (LIPITOR) 80 mg tablet Take 1 tablet (80 mg total) by mouth every evening 90 tablet 3    Cholecalciferol (Vitamin D-3) 25 MCG (1000 UT) CAPS Take 1 capsule by mouth daily      Cinnamon 500 MG capsule Take by mouth      clopidogrel (PLAVIX) 75 mg tablet Take 1 tablet (75 mg total) by mouth daily 90 tablet 3    Diclofenac Sodium (VOLTAREN) 1 % Apply 2 g topically if needed (Patient not taking: Reported on 7/10/2024)      diclofenac sodium (VOLTAREN) 50 mg EC tablet Take 50 mg by mouth 2 (two) times a day      finasteride (PROSCAR) 5 mg tablet TAKE 1/2 TABLET(2.5 MG) BY MOUTH DAILY AT BEDTIME 45 tablet 1    gabapentin (Neurontin) 300 mg capsule Take 1 capsule (300 mg total) by mouth 3 (three) times a day (Patient taking differently: Take 100 mg by mouth 2 (two) times a day) 90 capsule 2    losartan (COZAAR) 25 mg tablet Take 1 tablet (25 mg total) by mouth daily 90 tablet 2    methylPREDNISolone 4 MG tablet therapy pack Use as directed on package 1 each 0    metoprolol succinate (TOPROL-XL) 25 mg 24 hr tablet Take 1 tablet (25 mg total) by mouth daily 90 tablet 3    Multiple Vitamin (MULTIVITAMIN ADULT PO) Take 1 tablet by mouth daily       nitroglycerin (NITROSTAT) 0.4 mg SL tablet Place 0.4 mg under the tongue every 5 (five) minutes as needed      pantoprazole (PROTONIX) 20 mg tablet Take 1 tablet (20 mg total) by mouth daily before breakfast 30 tablet 5    Turmeric 400 MG CAPS Take 1 capsule by mouth daily      Zinc 50 MG TABS Take by mouth       No current facility-administered medications for this visit.       Medication compliance: Yes   Comments: Pt reports to be compliant with medications    Physical Limitations: sciatic pain    Fall Risk: Low   Comments: Ambulates with a steady gait with no assist device    Cultural needs: none      CAD Risk Factors:  Cholesterol: Yes  HTN: Yes  DM: Pre-diabetes  Obesity: No   Inactivity: No      EXERCISE ASSESSMENT:     Initial Fitness Assessment: Submaximal TM ETT:  Resting:  BP: 110/66  HR: 60, Exercise:  BP: 166/68  HR: 107, METs:  4.3, and Test terminated at:  RPE 6      ECG INTERPRETATION:  sinus pedro luis- NSR with freq PACs and occ PVCs    Current Functional Status:  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  Recreation/Physical Activity: shooting, ren[place] properties, and house in [place]  ADL’s:Capable of performing all ADLs  [place]: No limitations  Home exercise:  walks dog for 3 times a day for 20-30 min at a time  , using 10 lb weights 2-3 days/week  Other Comments: has TM at home will start using it, plans on possibly joining the fitness center      SMART Exercise Goals:   10% improvement in functional capacity based on max METs achieved in initial fitness assessment  improved DASI score by 10%  increased exercise capacity by 40% based on peak METs tolerated in cardiac rehab exercise session  maintain > 150 minutes per week of moderate intensity exercise    Patient Specific EXERCISE GOALS:       Increase strength- goal met   Increase stamina- goal met  Return to prior activities    Functional Capacity Screening Tool:  [facility] Activity Status Index:  9.89 METs      PSYCHOSOCIAL ASSESSMENT:    Date of last  "Assessment:  1/27/25  Depression screening:  PHQ-9 = 0    Interpretation:  0 =No Depression  Anxiety screening:  MEHRDAD-7 = 0    Interpretation: 0-4  = Not anxious    Pt self-report of depression and anxiety   Patient reports they are coping well with good social support and denies depression or anxiety    Self-reported stress level:  3   Stressors:  anxious to get back to all of his activities   Stress Management Tools: practice Relaxation Techniques, exercise, keep a positive mindset, enjoy a hobby, and spend time with family    SMART Psychosocial Goals:     Physical Fitness in [facility] Score < 3, Pain in [facility] Score < 3, and Change in Health in [facility] Score < 3     Patient Specific PSYCHOCOSOCIAL GOALS:    Return to social activities  Decrease stress level    Quality of Life Screen:  (Higher score indicates disease impact on QOL)  [facility] COOP score: 17/45     Social Support:   significant other  Community/Social Activities:      Psychosocial Assessment as it relates to rehabilitation:   Patient denies issues with his/her family or home life that may affect their rehabilitation efforts.       NUTRITION ASSESSMENT:    Initial Weight:  148.8  Current Weight: 149.8    Height:   Ht Readings from Last 1 Encounters:   01/22/25 5' 8\" (1.727 m)       Rate Your Plate Score: 58/81    Diabetes: Pre-diabetes  A1c: 6.5    last measured: 1/4/25    Lipid management: Discussed diet and lipid management and Last lipid profile 1/4/25  Chol 149  TRG 76  HDL 42  LDL 92    Current Dietary Habits:  Salad every night  Fruits and veggies  Fish 2x/week  Lots of chicken  Rare red meat  Does have cold cut sandwiches   Ice cream twice/week    SMART Nutrition Goals:   Improved Rate Your Plate score  >64, eat 6 or more servings of grain products per day, eat 5 or more servings of fruits and vegetables a day, eat 2 or more servings of low fat milk or yogurt a day, eat no more than 6oz of meat per day, dine out less than " once per week or choose low fat restaurant meals, choose lean beef or rarely eat beef, rarely eat processed meats or eat low fat processed meats, eat meatless meals twice a week or more, choose 1% or skim milk, rarely eat cheese or choose reduced fat or skim, rarely eat frozen desserts or choose fruit or fat-free sweets, Do not cook with oil, butter or margarine, choose light or fat-free salad dressings or traore, choose healthy snacks such as fruit, pretzels, and low fat crackers, choose healthy desserts and sweets such as gwyn food cake or  fruit, never add salt to food when cooking or at the table, choose low sodium canned, frozen/packaged foods or rarely/never eat, and choose low sugar desserts and sweets    Patient Specific NUTRITION GOALS:     1. Reduce sweets-  Goal Met   2. Decrease cold cuts   3. Increase meatless meals    Drug/Alcohol Use:   Yes, drinks wine      OTHER CORE COMPONENT ASSESSMENT:    Tobacco Use:     Pt quit 1979   and has abstained    Anginal Symptoms:  chest pressure and LEDEZMA   NTG use: Understands proper use, Reviewed Proper use, and Pt has not used NTG since event    SMART Goals:   consistent, controlled resting BP < 130/80 and medication compliance    Patient Specific CORE COMPONENT GOALS:    Monitor BP  Carry NTG      INDIVIDUALIZED TREATMENT PLAN      EXERCISE GOALS and PLAN      Progress toward Exercise goals:   Pt is progressing and showing improvement  toward the following goals:  tolerating exercise at 3.6-3.8 METs for 45-56  min, added resistance training in rehab, doing free weights at home 2-3 days/week.  , Pt has not made progress toward the following goals: formal cardiovascular home exercise. , Will continue to educate and progress as tolerated. Encouraged patient to walk 1-2 days/week without his dog.     Exercise Plan:    education on home exercise guidelines, home exercise 30+ mins 2 days opposite CR, and Group class: Risk Factors for Heart Disease    The patient was  counseled on exercise guidelines to achieve a minimum of 150 mins/wk of moderate intensity (RPE 4-6)   exercise and encouraged to add 1-2 days of exercise on opposite days of cardiac rehab as tolerated.       PHYSICIAN PRESCRIBED EXERCISE:    Current Aerobic Exercise Prescription:      Frequency: 3 days/week   Supplement with home exercise 2+ days/wk as tolerated       Minutes: 45-56         METS: 3.6-3.8             HR:     RPE: 3-4         Modalities: Treadmill, Airdyne bike, Lifecycle, and Rower     Exercise workloads will be progressed gradually as tolerated, within limits of patient's ability, and according to the patient's   response to the exercise program.      Aerobic Exercise Prescription Plan for Progression   Frequency: 3 days/week of cardiac rehab       Supplement with home exercise 2+ days/wk as tolerated    Minutes: 50-55     >150 mins/wk of moderate intensity exercise   METS: 3.8-4.4   HR: 50-85% HRR:        RPE: 4-6   Modalities: Treadmill, Airdyne bike, Lifecycle, and Elliptical    Strength trainin-3 days / week  12-15 repetitions  1-2 sets per modality    Modalities: Chest Press, Pull Downs, Arm Curl, Seated Row, and Sit to Stands    Home Exercise:  walking dog 3 times/day for 20-30 min and using 10 lb free weights at home 1-2 days/week    Exercise Education: benefit of exercise for CAD risk factors, home exercise guidelines, AHA guidelines to achieve >150 mins/wk of moderate exercise, RPE scale, Group class: Risk Factors for Heart Disease, and physical activity/exercise in extreme weather conditions     Readiness to change: Action:  (Changing behavior)      NUTRITION GOALS AND PLAN      Nutritional   Reviewed patient's Rate your Plate. Discussed key elements of heart healthy eating. Reviewed patient goals for dietary modifications and their clinical implications.  Reviewed most recent lipid profile.     Patient's progress toward Nutrition goals:    Pt is progressing and showing  improvement  toward the following goals:  weight remains stable, making dietary changes that were dicussed in rehab,reduced the amount of sweets.  , Will continue to educate and progress as tolerated.      Nutrition Plan:   increase intake of whole grains, increase daily intake of fruits and vegetables, limit meat intake to less than 6oz per day, choose healthy meals while dining out, choose lean red meat, reduce intake and /or  rarely eat processed meats , eat more meatless meals, drink/use 1%  low fat or skim  milk, eat reduced-fat or part-skim cheese or rarely eat, rarely eat frozen desserts, cook without fats or oils, use light or fat-free salad dressings and mayonnaise, eat healthy snacks like fruit, pretzels, and low fat crackers, choose desserts such as fruit, gwyn food cake, low-fat or fat-free sweets, and choose low sugar desserts and sweets    Measurable goals were based Rate Your Plate Dietary Self-Assessment. These are the areas in which the patient could score higher on the assessment.  Goals include recommendations for a heart healthy diet based on American Heart Association.    Nutrition Education:   heart healthy eating principles  maintaining hydration  nutrition for  lipid management  healthy choices while dining out  portion control  group class: Heart Healthy Eating  group class:  Label Reading    Readiness to change: Action:  (Changing behavior)      PSYCHOSOCIAL GOALS AND PLAN    Psychosocial Assessment as it relates to rehabilitation:   Patient denies issues with his/her family or home life that may affect their rehabilitation efforts.     Patient's progress toward Psychosocial goals:    Pt is progressing and showing improvement  toward the following goals:  no concerns about emotional status, stress is manageable.      Psychosocial Intervention/plan:   Class: Stress and Your Health, Class: Relaxation, Practice relaxation techniques, Exercise, Keep a positive mindset, and Enjoy  family    Psychosocial Education: benefits of a positive support system and stress management techniques    Information to utilize [employer] was provided as well as contact information for counseling through  Behavioral Health and group psychotherapy groups available.    Readiness to change: Action:  (Changing behavior)      OTHER CORE COMPONENTS GOALS and PLAN    Blood Pressure will be monitored throughout the program and cardiologist will be notified of elevated trends.    Pt will be encouraged to monitor home BP if advised by cardiologist.    Tobacco Plan:   Pt quit 1979 and has abstained since quitting.      Progress toward Core Component goals:   Pt is progressing and showing improvement  toward the following goals:  BP normal with occ elevation, medication compliance, carrying NTG .  , Will continue to educate and progress as tolerated.    Other Core Components Intervention:   group class: Understanding Heart Disease, group class: Common Heart Medications, medication compliance, avoid processed foods, engage in regular exercise, eliminate salt shaker at the table, check labels for sodium content, and monitor home BP    Group and Individual Education:  understanding high blood pressure and it's relationship to CAD, components of blood pressure management, and proper use of sublingual NTG    Readiness to change: Action:  (Changing behavior)

## 2025-03-27 ENCOUNTER — CLINICAL SUPPORT (OUTPATIENT)
Dept: CARDIAC REHAB | Facility: CLINIC | Age: 75
End: 2025-03-27
Payer: MEDICARE

## 2025-03-27 DIAGNOSIS — I21.4 NSTEMI (NON-ST ELEVATED MYOCARDIAL INFARCTION) (HCC): Primary | ICD-10-CM

## 2025-03-27 PROCEDURE — 93798 PHYS/QHP OP CAR RHAB W/ECG: CPT

## 2025-03-28 ENCOUNTER — CLINICAL SUPPORT (OUTPATIENT)
Dept: CARDIAC REHAB | Facility: CLINIC | Age: 75
End: 2025-03-28
Payer: MEDICARE

## 2025-03-28 DIAGNOSIS — I21.4 NSTEMI (NON-ST ELEVATED MYOCARDIAL INFARCTION) (HCC): Primary | ICD-10-CM

## 2025-03-28 PROCEDURE — 93798 PHYS/QHP OP CAR RHAB W/ECG: CPT

## 2025-04-01 ENCOUNTER — CLINICAL SUPPORT (OUTPATIENT)
Dept: CARDIAC REHAB | Facility: CLINIC | Age: 75
End: 2025-04-01
Payer: MEDICARE

## 2025-04-01 DIAGNOSIS — I21.4 NSTEMI (NON-ST ELEVATED MYOCARDIAL INFARCTION) (HCC): Primary | ICD-10-CM

## 2025-04-01 PROCEDURE — 93798 PHYS/QHP OP CAR RHAB W/ECG: CPT

## 2025-04-03 ENCOUNTER — CLINICAL SUPPORT (OUTPATIENT)
Dept: CARDIAC REHAB | Facility: CLINIC | Age: 75
End: 2025-04-03
Payer: MEDICARE

## 2025-04-03 DIAGNOSIS — I21.4 NSTEMI (NON-ST ELEVATED MYOCARDIAL INFARCTION) (HCC): Primary | ICD-10-CM

## 2025-04-03 PROCEDURE — 93798 PHYS/QHP OP CAR RHAB W/ECG: CPT

## 2025-04-04 ENCOUNTER — CLINICAL SUPPORT (OUTPATIENT)
Dept: CARDIAC REHAB | Facility: CLINIC | Age: 75
End: 2025-04-04
Payer: MEDICARE

## 2025-04-04 DIAGNOSIS — I21.4 NSTEMI (NON-ST ELEVATED MYOCARDIAL INFARCTION) (HCC): Primary | ICD-10-CM

## 2025-04-04 PROCEDURE — 93798 PHYS/QHP OP CAR RHAB W/ECG: CPT

## 2025-04-08 ENCOUNTER — CLINICAL SUPPORT (OUTPATIENT)
Dept: CARDIAC REHAB | Facility: CLINIC | Age: 75
End: 2025-04-08
Payer: MEDICARE

## 2025-04-08 DIAGNOSIS — I21.4 NSTEMI (NON-ST ELEVATED MYOCARDIAL INFARCTION) (HCC): Primary | ICD-10-CM

## 2025-04-08 PROCEDURE — 93798 PHYS/QHP OP CAR RHAB W/ECG: CPT

## 2025-04-09 ENCOUNTER — APPOINTMENT (OUTPATIENT)
Dept: LAB | Facility: AMBULARY SURGERY CENTER | Age: 75
End: 2025-04-09
Payer: MEDICARE

## 2025-04-09 DIAGNOSIS — I10 BENIGN ESSENTIAL HYPERTENSION: ICD-10-CM

## 2025-04-09 DIAGNOSIS — E78.5 DYSLIPIDEMIA: ICD-10-CM

## 2025-04-09 DIAGNOSIS — E78.5 HYPERLIPIDEMIA, UNSPECIFIED HYPERLIPIDEMIA TYPE: ICD-10-CM

## 2025-04-09 DIAGNOSIS — Z95.5 S/P PRIMARY ANGIOPLASTY WITH CORONARY STENT: ICD-10-CM

## 2025-04-09 LAB
CHOLEST SERPL-MCNC: 156 MG/DL (ref ?–200)
HDLC SERPL-MCNC: 41 MG/DL
LDLC SERPL CALC-MCNC: 100 MG/DL (ref 0–100)
TRIGL SERPL-MCNC: 77 MG/DL (ref ?–150)

## 2025-04-09 PROCEDURE — 80061 LIPID PANEL: CPT

## 2025-04-09 PROCEDURE — 36415 COLL VENOUS BLD VENIPUNCTURE: CPT

## 2025-04-09 RX ORDER — LOSARTAN POTASSIUM 25 MG/1
25 TABLET ORAL DAILY
Qty: 90 TABLET | Refills: 1 | Status: SHIPPED | OUTPATIENT
Start: 2025-04-09

## 2025-04-09 RX ORDER — ASPIRIN 81 MG/1
81 TABLET, CHEWABLE ORAL
Qty: 90 TABLET | Refills: 1 | Status: SHIPPED | OUTPATIENT
Start: 2025-04-09

## 2025-04-09 RX ORDER — METOPROLOL SUCCINATE 25 MG/1
25 TABLET, EXTENDED RELEASE ORAL DAILY
Qty: 90 TABLET | Refills: 1 | Status: SHIPPED | OUTPATIENT
Start: 2025-04-09

## 2025-04-09 RX ORDER — CLOPIDOGREL BISULFATE 75 MG/1
75 TABLET ORAL DAILY
Qty: 90 TABLET | Refills: 1 | Status: SHIPPED | OUTPATIENT
Start: 2025-04-09

## 2025-04-09 RX ORDER — ATORVASTATIN CALCIUM 80 MG/1
80 TABLET, FILM COATED ORAL EVERY EVENING
Qty: 90 TABLET | Refills: 1 | Status: SHIPPED | OUTPATIENT
Start: 2025-04-09

## 2025-04-09 NOTE — TELEPHONE ENCOUNTER
Pt contacted Call Center requested refill of their medication.        Doctor Name: Dr. Pichardo       Medication Name: atorvastatin      Dosage of Med: 80mg      Frequency of Med: Take 1 tablet (80 mg total) by mouth every evening       Remaining Medication: 0      Pharmacy and Location: [facility] [address] [place] [address] [facility] [[id number]]         Pt. Preferred Callback Phone Number: [phone]       Thank you.       PLEASE ADVISE PATIENTS:    REFILL REQUESTS WILL BE PROCESSED WITHIN 24-48 HOURS.    Pt contacted Call Center requested refill of their medication.        Doctor Name: Dr. Pichardo      Medication Name: metoprolol succinate       Dosage of Med: 25mg      Frequency of Med: Take 1 tablet (25 mg total) by mouth daily,       Remaining Medication: 0      Pharmacy and Location: [facility] [address] [[id number]]         Pt. Preferred Callback Phone Number: [phone]       Thank you.       PLEASE ADVISE PATIENTS:    REFILL REQUESTS WILL BE PROCESSED WITHIN 24-48 HOURS.    Pt contacted Call Center requested refill of their medication.        Doctor Name: Dr. Pichardo      Medication Name: losartan       Dosage of Med: 25 mg      Frequency of Med: Take 1 tablet (25 mg total) by mouth daily,       Remaining Medication: 0      Pharmacy and Location: [facility] [address] [place], [place] [address] [facility] [[id number]]         Pt. Preferred Callback Phone Number: [phone]       Thank you.       PLEASE ADVISE PATIENTS:    REFILL REQUESTS WILL BE PROCESSED WITHIN 24-48 HOURS.    Pt contacted Call Center requested refill of their medication.        Doctor Name: Dr. Pichardo      Medication Name: clopidogrel       Dosage of Med: 75 mg      Frequency of Med: Take 1 tablet (80 mg total) by mouth every evening,       Remaining Medication: 0      Pharmacy and Location: [facility] [address] [place], [place] [address]  [facility] [[id number]]         Pt. Preferred Callback Phone Number: [phone]       Thank you.       PLEASE ADVISE PATIENTS:    REFILL REQUESTS WILL BE PROCESSED WITHIN 24-48 HOURS.        Pt contacted Call Center requested refill of their medication.        Doctor Name: Dr. Pichardo      Medication Name: aspirin       Dosage of Med: 81mg      Frequency of Med: Chew 81 mg daily at bedtime       Remaining Medication: 0      [place]acy and [place] [facility] [id number] [place] [address] [facility] [[id number]]     Pt. Preferred Callback Phone Number: [phone]       Thank you.       PLEASE ADVISE PATIENTS:    REFILL REQUESTS WILL BE PROCESSED WITHIN 24-48 HOURS.

## 2025-04-10 ENCOUNTER — CLINICAL SUPPORT (OUTPATIENT)
Dept: CARDIAC REHAB | Facility: CLINIC | Age: 75
End: 2025-04-10
Payer: MEDICARE

## 2025-04-10 DIAGNOSIS — I21.4 NSTEMI (NON-ST ELEVATED MYOCARDIAL INFARCTION) (HCC): Primary | ICD-10-CM

## 2025-04-10 PROCEDURE — 93798 PHYS/QHP OP CAR RHAB W/ECG: CPT

## 2025-04-11 ENCOUNTER — CLINICAL SUPPORT (OUTPATIENT)
Dept: CARDIAC REHAB | Facility: CLINIC | Age: 75
End: 2025-04-11
Payer: MEDICARE

## 2025-04-11 DIAGNOSIS — I21.4 NSTEMI (NON-ST ELEVATED MYOCARDIAL INFARCTION) (HCC): Primary | ICD-10-CM

## 2025-04-11 PROCEDURE — 93798 PHYS/QHP OP CAR RHAB W/ECG: CPT

## 2025-04-15 ENCOUNTER — CLINICAL SUPPORT (OUTPATIENT)
Dept: CARDIAC REHAB | Facility: CLINIC | Age: 75
End: 2025-04-15
Payer: MEDICARE

## 2025-04-15 DIAGNOSIS — I21.4 NSTEMI (NON-ST ELEVATED MYOCARDIAL INFARCTION) (HCC): Primary | ICD-10-CM

## 2025-04-15 PROCEDURE — 93798 PHYS/QHP OP CAR RHAB W/ECG: CPT

## 2025-04-17 ENCOUNTER — CLINICAL SUPPORT (OUTPATIENT)
Dept: CARDIAC REHAB | Facility: CLINIC | Age: 75
End: 2025-04-17
Payer: MEDICARE

## 2025-04-17 DIAGNOSIS — I21.4 NSTEMI (NON-ST ELEVATED MYOCARDIAL INFARCTION) (HCC): Primary | ICD-10-CM

## 2025-04-17 PROCEDURE — 93798 PHYS/QHP OP CAR RHAB W/ECG: CPT

## 2025-04-18 ENCOUNTER — CLINICAL SUPPORT (OUTPATIENT)
Dept: CARDIAC REHAB | Facility: CLINIC | Age: 75
End: 2025-04-18
Payer: MEDICARE

## 2025-04-18 DIAGNOSIS — G89.29 CHRONIC BILATERAL LOW BACK PAIN WITH BILATERAL SCIATICA: Primary | ICD-10-CM

## 2025-04-18 DIAGNOSIS — M54.42 CHRONIC BILATERAL LOW BACK PAIN WITH BILATERAL SCIATICA: Primary | ICD-10-CM

## 2025-04-18 DIAGNOSIS — M54.41 CHRONIC BILATERAL LOW BACK PAIN WITH BILATERAL SCIATICA: Primary | ICD-10-CM

## 2025-04-18 DIAGNOSIS — I21.4 NSTEMI (NON-ST ELEVATED MYOCARDIAL INFARCTION) (HCC): Primary | ICD-10-CM

## 2025-04-18 PROCEDURE — 93798 PHYS/QHP OP CAR RHAB W/ECG: CPT

## 2025-04-18 NOTE — TELEPHONE ENCOUNTER
Routing to pcp Eze BARRAGAN, patient last seen jan 2024, please advised. Has not been ordered since 06/03/2021 by historical provider. Please follow up with patient after speaking with provider thank you

## 2025-04-18 NOTE — TELEPHONE ENCOUNTER
Patient is calling requesting a refill benigno  diclofenac sodium (VOLTAREN) 50 mg EC tablet. Medication is no longer on active medication list. Are you willing to refill?    Please send to Andrzej Loo

## 2025-04-21 ENCOUNTER — DOCUMENTATION (OUTPATIENT)
Dept: ADMINISTRATIVE | Facility: OTHER | Age: 75
End: 2025-04-21

## 2025-04-21 NOTE — TELEPHONE ENCOUNTER
Patient needs refill on Diclofenac 50 mg EC. I transferred patient to office to schedule appointment     Reason for call:   [x] Refill   [] Prior Auth  [] Other:     Office:   [x] PCP/Provider - Jenny Lang  [] Specialty/Provider -     Medication: Diclofenac Sodium EC    Dose/Frequency: 50 mg BID    Quantity: 120    Pharmacy: [facility]    Local Pharmacy   Does the patient have enough for 3 days?   [] Yes   [x] No - Send as HP to POD    Mail Away Pharmacy   Does the patient have enough for 10 days?   [] Yes   [] No - Send as HP to POD

## 2025-04-21 NOTE — PROGRESS NOTES
04/21/25 1:28 PM    Annual Wellness Visit outreach is not required, the practice has sent a reminder message/ mail for AWV to the patient.    Thank you.  Jonnathan Skinner MA  [facility]

## 2025-04-22 ENCOUNTER — CLINICAL SUPPORT (OUTPATIENT)
Dept: CARDIAC REHAB | Facility: CLINIC | Age: 75
End: 2025-04-22
Payer: MEDICARE

## 2025-04-22 DIAGNOSIS — I21.4 NSTEMI (NON-ST ELEVATED MYOCARDIAL INFARCTION) (HCC): Primary | ICD-10-CM

## 2025-04-22 PROCEDURE — 93798 PHYS/QHP OP CAR RHAB W/ECG: CPT

## 2025-04-22 NOTE — PROGRESS NOTES
CARDIAC REHABILITATION   ASSESSMENT AND INDIVIDUALIZED TREATMENT PLAN  DISCHARGE            Today's date: 2025   # of Exercise Sessions Completed: 36  Patient name: Shaun Mohr      : 1950  Age: 75 y.o.       MRN: [id number]  Referring Physician: Gavin Hutchison MD  Cardiologist: Norberto Pichardo MD   Provider: Eliseo  Clinician: Leydi Choe MS, CEP, EPC        Treatment is tailored to this patient's individual needs.  The ITP was reviewed with the patient and all questions were answered to their satisfaction.  Additional ITP documentation can be found electronically including daily and monthly exercise summaries, daily session notes with ECG summaries, education notes, daily medication reconciliation, and daily physician supervision.      Comments: DISCHARGE SUMMARY  2025    Completed 36/36 exercise sessions  Functional capacity:   Pre: 3.3 METs, Post: 4.5 METs  Max METs tolerated in cardiac rehab:  Pre: 3.3,  Post: 4.6  [facility] QOL:  Pre: 17, Post: 17  PHQ-9:  Pre: 0, Post: 0  Weight:  Pre: 148.8,  Post: 151  Exercise session details:  55-58 minutes,  3.6-4.5 METs  Resting BP  112/66 - 128/70,  HR 55 - 95  Exercise /66- 188/88.   - 145  Telemetry:  sinus pedro luis-NSR with PACs  Symptoms: none  Discharge Plans: join [facility]    3/25/2025 SUMMARY     Resting BP  108/62 - 130/72,  HR 57 - 71  Exercise /60- 142/62.  HR 91 - 121  Exercise session details:  45-56 minutes,  3.6-3.8 METs  Telemetry:  NSR,occ PVC's,PAC's  Symptoms: none  Home exercise/ADLs: no formal exercise, walks dog 3/day for 20-30 min each time, strength trains 1-2 days/week  Patient's subjective report of progress: pt states he is feeling great with increased energy, no cardiac complaints   Clinical Comments: progressing exercise intensity well      25: SUMMARY     Resting BP  100/62 - 140/70,  HR 57 - 77  Exercise /64- 162/62.  HR 87 - 116  Exercise session  details:  38 minutes,  3.3-3.9 METs  Telemetry:  sinus pedro luis- NSR with freq PACs and occ PVCs  Symptoms: none  Home exercise/ADLs: completing ADLs without concerns. Walking dog but encouraged to add walking by himself. Doing free weights 2-3 days/week   Patient's subjective report of progress: patient has been feeling good overall     1/27/25: feeling much better since getting the stent placed. No LEDEZMA or chest pressure        Dx:   Encounter Diagnosis   Name Primary?    NSTEMI (non-ST elevated myocardial infarction) (HCC) Yes       Description of Diagnosis: noted SOB with daily walks and associated right sided chest pressure. NSTEMI s/p CI/PORTIA to RCA  Date of onset: 1/3/25  Other Cardiac History: family hx of premature CAD        ASSESSMENT    Medical History:   Past Medical History:   Diagnosis Date    Hernia, inguinal, right 02/24/2021    Hypertension     Irregular heart beat     Lung nodule        Family History:  Family History   Problem Relation Age of Onset    Diabetes Mother     Heart attack Father        Allergies:   Patient has no known allergies.    Current Medications:   Current Outpatient Medications   Medication Sig Dispense Refill    Ascorbic Acid (vitamin C) 1000 MG tablet Take 1,000 mg by mouth daily      aspirin 81 mg chewable tablet Chew 1 tablet (81 mg total) daily at bedtime 90 tablet 1    atorvastatin (LIPITOR) 80 mg tablet Take 1 tablet (80 mg total) by mouth every evening 90 tablet 1    Cholecalciferol (Vitamin D-3) 25 MCG (1000 UT) CAPS Take 1 capsule by mouth daily      Cinnamon 500 MG capsule Take by mouth      clopidogrel (PLAVIX) 75 mg tablet Take 1 tablet (75 mg total) by mouth daily 90 tablet 1    Diclofenac Sodium (VOLTAREN) 1 % Apply 2 g topically if needed (musculoskeletal pain) 50 g 0    diclofenac sodium (VOLTAREN) 50 mg EC tablet Take 50 mg by mouth 2 (two) times a day      finasteride (PROSCAR) 5 mg tablet TAKE 1/2 TABLET(2.5 MG) BY MOUTH DAILY AT BEDTIME 45 tablet 1     gabapentin (Neurontin) 300 mg capsule Take 1 capsule (300 mg total) by mouth 3 (three) times a day (Patient taking differently: Take 100 mg by mouth 2 (two) times a day) 90 capsule 2    losartan (COZAAR) 25 mg tablet Take 1 tablet (25 mg total) by mouth daily 90 tablet 1    methylPREDNISolone 4 MG tablet therapy pack Use as directed on package 1 each 0    metoprolol succinate (TOPROL-XL) 25 mg 24 hr tablet Take 1 tablet (25 mg total) by mouth daily 90 tablet 1    Multiple Vitamin (MULTIVITAMIN ADULT PO) Take 1 tablet by mouth daily      nitroglycerin (NITROSTAT) 0.4 mg SL tablet Place 0.4 mg under the tongue every 5 (five) minutes as needed      pantoprazole (PROTONIX) 20 mg tablet Take 1 tablet (20 mg total) by mouth daily before breakfast 30 tablet 5    Turmeric 400 MG CAPS Take 1 capsule by mouth daily      Zinc 50 MG TABS Take by mouth       No current facility-administered medications for this visit.       Medication compliance: Yes   Comments: Pt reports to be compliant with medications    Physical Limitations: sciatic pain    Fall Risk: Low   Comments: Ambulates with a steady gait with no assist device    Cultural needs: none      CAD Risk Factors:  Cholesterol: Yes  HTN: Yes  DM: Pre-diabetes  Obesity: No   Inactivity: No      EXERCISE ASSESSMENT:     Final Fitness Assessment: Submaximal TM ETT:  Resting:  BP: 114/68  HR: 70, Exercise:  BP: 172/66  HR: 125, METs:  9.6, and Test terminated at:  RPE 6      ECG INTERPRETATION:  sinus pedro luis- NSR with freq PACs and occ PVCs    Current Functional Status:  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  Recreation/Physical Activity: shooting, [place] properties, and house in [place]  ADL’s:Capable of performing all ADLs  [place]: No limitations  Home exercise:  walks dog for 3 times a day for 20-30 min at a time  , using 10 lb weights 2-3 days/week  Other Comments: has TM at home will start using it, plans on possibly joining the fitness center      SMART Exercise Goals:  "  10% improvement in functional capacity based on max METs achieved in initial fitness assessment  improved DASI score by 10%  increased exercise capacity by 40% based on peak METs tolerated in cardiac rehab exercise session  maintain > 150 minutes per week of moderate intensity exercise    Patient Specific EXERCISE GOALS:       Increase strength- goal met   Increase stamina- goal met  Return to prior activities- goal met    Functional Capacity Screening Tool:  [facility] Activity Status Index:  9.89 METs      PSYCHOSOCIAL ASSESSMENT:    Date of last Assessment:  4/22/25  Depression screening:  PHQ-9 = 0    Interpretation:  0 =No Depression  Anxiety screening:  MEHRDAD-7 = 0    Interpretation: 0-4  = Not anxious    Pt self-report of depression and anxiety   Patient reports they are coping well with good social support and denies depression or anxiety    Self-reported stress level:  2   Stressors:  anxious to get back to all of his activities   Stress Management Tools: practice Relaxation Techniques, exercise, keep a positive mindset, enjoy a hobby, and spend time with family    SMART Psychosocial Goals:     Physical Fitness in [facility] Score < 3, Pain in [facility] Score < 3, and Change in Health in [facility] Score < 3     Patient Specific PSYCHOCOSOCIAL GOALS:    Return to social activities- goal met  Decrease stress level- goal met     Quality of Life Screen:  (Higher score indicates disease impact on QOL)  [facility] COOP score: 17/45     Social Support:   significant other  Community/Social Activities:      Psychosocial Assessment as it relates to rehabilitation:   Patient denies issues with his/her family or home life that may affect their rehabilitation efforts.       NUTRITION ASSESSMENT:    Initial Weight:  148.8  Current Weight: 151    Height:   Ht Readings from Last 1 Encounters:   01/22/25 5' 8\" (1.727 m)       Rate Your Plate Score: 58/81    Diabetes: Pre-diabetes  A1c: 6.5    last measured: " 1/4/25    Lipid management: Discussed diet and lipid management and Last lipid profile 4/9/25  Chol 156  TRG 77  HDL 41      Current Dietary Habits:  Salad every night  Fruits and veggies  Fish 2x/week  Lots of chicken  Rare red meat  Does have cold cut sandwiches   Ice cream twice/week    SMART Nutrition Goals:   Improved Rate Your Plate score  >64, eat 6 or more servings of grain products per day, eat 5 or more servings of fruits and vegetables a day, eat 2 or more servings of low fat milk or yogurt a day, eat no more than 6oz of meat per day, dine out less than once per week or choose low fat restaurant meals, choose lean beef or rarely eat beef, rarely eat processed meats or eat low fat processed meats, eat meatless meals twice a week or more, choose 1% or skim milk, rarely eat cheese or choose reduced fat or skim, rarely eat frozen desserts or choose fruit or fat-free sweets, Do not cook with oil, butter or margarine, choose light or fat-free salad dressings or traore, choose healthy snacks such as fruit, pretzels, and low fat crackers, choose healthy desserts and sweets such as gwyn food cake or  fruit, never add salt to food when cooking or at the table, choose low sodium canned, frozen/packaged foods or rarely/never eat, and choose low sugar desserts and sweets    Patient Specific NUTRITION GOALS:     1. Reduce sweets-  Goal Met   2. Decrease cold cuts- goal met    3. Increase meatless meals- goal met    Drug/Alcohol Use:   Yes, drinks wine      OTHER CORE COMPONENT ASSESSMENT:    Tobacco Use:     Pt quit 1979   and has abstained    Anginal Symptoms:  chest pressure and LEDEZMA   NTG use: Understands proper use, Reviewed Proper use, and Pt has not used NTG since event    SMART Goals:   consistent, controlled resting BP < 130/80 and medication compliance    Patient Specific CORE COMPONENT GOALS:    Monitor BP- goal met  Carry NTG- goal met      INDIVIDUALIZED TREATMENT PLAN      EXERCISE GOALS and  PLAN      Progress toward Exercise goals:   Goals met: tolerating exercise at 3.6-4.5 METs, resistance training, improved max METs in fitness assessment by 123.3%, increased peak METs in CR by 36.4%, increased strength and endurance., Patient will be encouraged to focus on lifestyle modifications following discharge.      Exercise Plan:    education on home exercise guidelines, home exercise 30+ mins 2 days opposite CR, Patient education:   Exercise After Cardiac Rehabilitation, and After discharge patient will continue to follow a regular exercise regimen with the goal of a minimum of 150 minutes per week of moderate intensity exercise.      The patient was counseled on exercise guidelines to achieve a minimum of 150 mins/wk of moderate intensity (RPE 4-6)   exercise and encouraged to add 1-2 days of exercise on opposite days of cardiac rehab as tolerated.       PHYSICIAN PRESCRIBED EXERCISE:    Current Aerobic Exercise Prescription:      Frequency: 3 days/week   Supplement with home exercise 2+ days/wk as tolerated       Minutes: 55-58         METS: 3.6-4.5            HR:  116-145   RPE: 3-4         Modalities: Treadmill, Lifecycle, and Rower     Exercise workloads will be progressed gradually as tolerated, within limits of patient's ability, and according to the patient's   response to the exercise program.    Exercise Progression after Discharge:    Frequency: 3-5 days of gym or home exercise   Minutes: 30-50  >150 mins/wk of moderate intensity exercise   METS: 3.5 - 5.0   HR: 110 - 140    RPE: 4-6   Modalities: Treadmill, Airdyne bike, Lifecycle, and Rower    Strength trainin-3 days / week  12-15 repetitions  1-2 sets per modality    Modalities: Chest Press, Pull Downs, Arm Curl, Seated Row, and Sit to Stands    Home Exercise:  walking dog 3 times/day for 20-30 min and using 10 lb free weights at home 1-2 days/week    Exercise Education: benefit of exercise for CAD risk factors, home exercise guidelines,  AHA guidelines to achieve >150 mins/wk of moderate exercise, RPE scale, Group class: Risk Factors for Heart Disease, and physical activity/exercise in extreme weather conditions     Readiness to change: Maintenance: (Maintaining the behavior change)      NUTRITION GOALS AND PLAN      Nutritional   Reviewed patient's Rate your Plate. Discussed key elements of heart healthy eating. Reviewed patient goals for dietary modifications and their clinical implications.  Reviewed most recent lipid profile.     Patient's progress toward Nutrition goals:    Goals met: improved Rate Your Plate score, weight remains in a healthy range, reduced cold cuts, sweets, and eating more meatless meals ., Patient will be encouraged to focus on lifestyle modifications following discharge.      Nutrition Plan:   increase intake of whole grains, increase daily intake of fruits and vegetables, limit meat intake to less than 6oz per day, choose healthy meals while dining out, choose lean red meat, reduce intake and /or  rarely eat processed meats , eat more meatless meals, drink/use 1%  low fat or skim  milk, eat reduced-fat or part-skim cheese or rarely eat, rarely eat frozen desserts, cook without fats or oils, use light or fat-free salad dressings and mayonnaise, eat healthy snacks like fruit, pretzels, and low fat crackers, choose desserts such as fruit, gwyn food cake, low-fat or fat-free sweets, and choose low sugar desserts and sweets    Measurable goals were based Rate Your Plate Dietary Self-Assessment. These are the areas in which the patient could score higher on the assessment.  Goals include recommendations for a heart healthy diet based on American Heart Association.    Nutrition Education:   heart healthy eating principles  maintaining hydration  nutrition for  lipid management  healthy choices while dining out  portion control  group class: Heart Healthy Eating  group class:  Label Reading    Readiness to change: Maintenance:  (Maintaining the behavior change)      PSYCHOSOCIAL GOALS AND PLAN    Psychosocial Assessment as it relates to rehabilitation:   Patient denies issues with his/her family or home life that may affect their rehabilitation efforts.     Patient's progress toward Psychosocial goals:    Goals met: PHQ-9 and MEHRDAD-7 scores remain 0, [place] score remains low, lowered stress level, no concerns with emotional health., Patient will be encouraged to focus on lifestyle modifications following discharge.    Psychosocial Intervention/plan:   Practice relaxation techniques, Exercise, Keep a positive mindset, and Enjoy family    Psychosocial Education: benefits of a positive support system, stress management techniques, class:  Relaxation, and class:  Stress and Your Health     Information to utilize [employer] was provided as well as contact information for counseling through  Behavioral Health and group psychotherapy groups available.    Readiness to change: Maintenance: (Maintaining the behavior change)      OTHER CORE COMPONENTS GOALS and PLAN    Blood Pressure will be monitored throughout the program and cardiologist will be notified of elevated trends.    Pt will be encouraged to monitor home BP if advised by cardiologist.    Tobacco Plan:   Pt quit 1979 and has abstained since quitting.      Progress toward Core Component goals:   Goals met: BP normal, medication compliance, carrying NTG., Patient will be encouraged to focus on lifestyle modifications following discharge.    Other Core Components Intervention:   medication compliance, avoid processed foods, engage in regular exercise, eliminate salt shaker at the table, check labels for sodium content, and monitor home BP    Group and Individual Education:  understanding high blood pressure and it's relationship to CAD, components of blood pressure management, proper use of sublingual NTG, group class: Understanding Heart Disease, and group class:  Common Heart  Medications    Readiness to change: Maintenance: (Maintaining the behavior change)

## 2025-04-25 ENCOUNTER — OFFICE VISIT (OUTPATIENT)
Dept: CARDIOLOGY CLINIC | Facility: CLINIC | Age: 75
End: 2025-04-25
Payer: MEDICARE

## 2025-04-25 VITALS
BODY MASS INDEX: 23.04 KG/M2 | SYSTOLIC BLOOD PRESSURE: 118 MMHG | HEART RATE: 57 BPM | TEMPERATURE: 97.9 F | OXYGEN SATURATION: 97 % | WEIGHT: 152 LBS | HEIGHT: 68 IN | DIASTOLIC BLOOD PRESSURE: 60 MMHG

## 2025-04-25 DIAGNOSIS — Z82.49 FAMILY HISTORY OF EARLY CAD: ICD-10-CM

## 2025-04-25 DIAGNOSIS — E78.5 HYPERLIPIDEMIA, UNSPECIFIED HYPERLIPIDEMIA TYPE: ICD-10-CM

## 2025-04-25 DIAGNOSIS — I25.10 CORONARY ARTERY DISEASE INVOLVING NATIVE CORONARY ARTERY OF NATIVE HEART WITHOUT ANGINA PECTORIS: ICD-10-CM

## 2025-04-25 DIAGNOSIS — I10 BENIGN ESSENTIAL HYPERTENSION: ICD-10-CM

## 2025-04-25 DIAGNOSIS — Z95.5 S/P PRIMARY ANGIOPLASTY WITH CORONARY STENT: Primary | ICD-10-CM

## 2025-04-25 PROCEDURE — 99214 OFFICE O/P EST MOD 30 MIN: CPT | Performed by: INTERNAL MEDICINE

## 2025-04-25 RX ORDER — CLOPIDOGREL BISULFATE 75 MG/1
75 TABLET ORAL DAILY
Qty: 90 TABLET | Refills: 2 | Status: SHIPPED | OUTPATIENT
Start: 2025-04-25 | End: 2026-01-05

## 2025-04-25 RX ORDER — EZETIMIBE 10 MG/1
10 TABLET ORAL DAILY
Qty: 90 TABLET | Refills: 2 | Status: SHIPPED | OUTPATIENT
Start: 2025-04-25

## 2025-04-25 NOTE — PROGRESS NOTES
[facility] [place]   [address] [facility] [id number] [phone]                                            Cardiology Office Follow up  Shaun Mohr, 75 y.o. male  YOB: 1950  MRN: [id number] Encounter: [id number]      PCP - Jenny Lang DO  Referring Provider - No ref. provider found    No chief complaint on file.      Assessment  CAD s/p PCI with PORTIA  25 - [facility] ([facility]) -   LM 15%,   LAD - mild,   LCX - normal,   RCA 98%, calcified, tortuous --> s/p PORTIA to RCA  25 TTE - LVEF 55-60%  25 - Nuclear stress: reversible defect diffusely involving   inferior wall of the left ventricle, suggesting myocardial   ischemia.   Frequent PVCs  PACs  Family history of heart attack  Father  of heart attack at 52 - did not take care of himself  CT chest - 2022 - prominent coronary artery calcifications  GERD    Plan  CAD s/p PCI with PORTIA to RCA  Overview  2025: While traveling out to [place], he had issues with chest pain and was admitted and underwent a nuclear stress test which revealed concerns for inferior ischemia.  Subsequent left heart cath confirmed severe RCA stenosis, which was successfully stented.  Discharged on aspirin, Plavix for 1 year  2025: No current chest pain or shortness of breath.  He has completed cardiac rehab and has done well  Impression  Stable CAD, currently asymptomatic  Plan  Continue aspirin 81 mg daily indefinitely  Continue clopidogrel 75 mg daily until 2026, and then plan to stop as long as he is free of chest pain  Continue atorvastatin 80 mg daily  LDL still at 100  Add ezetimibe 10 mg daily  Continue metoprolol succinate 25 mg daily      Frequent PVCs, PACs  Overview  3/2023: ECG - NSR, PACs and PVCs. No acute ST-T changes  Clinically, he continues to have ectopic beats today during my exam  He does not seem to have any palpitations associated with it though and is mostly asymptomatic  Risk  factors  Caffeine - daily 1-2 cups  Alcohol - daily 1-2 glasses of wine  4/3/2024: initial consult with me  No major symptoms, holter ordered  4/10/24 Holter: Sinus rhythm with period of sinus tachycardia   frequent PVCs, 6.4% burden  frequent PACs, 5.7% burden.    No significant tachyarrhythmia  7/10/24: Continues to feel well.  He has cut down on alcohol and caffeine as per prior discussion.  No significant ectopy on clinical exam today  2025: no palpitations, no chest pain  Impression  Low burden PACs and PVCs, which are asymptomatic to patient  Plan  Continue metoprolol succinate 25 mg daily  Limit caffeine, alcohol intake  Encourage regular exercises  Encouraged doing an additional 15-minute walk in addition to his dog walk (his dog is older and slow)    Dyslipidemia, family history of heart attack / sudden death    Overview  Primary concern: Father  at the age of 52 for heart attack, while he was at a racetrack  2022: prominent coronary artery calcifications in the LAD   2024: continue rosuvastatin 20 mg daily  2025: Rosuvastatin 20 was changed to atorvastatin 80 post PCI  2025: Cholesterol levels well-controlled, but LDL still 100  Plan   Continue atorvastatin 80 mg daily  Add ezetimibe 10 mg daily  Check repeat lipid panel, CMP at 2 months  If LDL still above 70, then can switch from atorvastatin 80 mg daily to rosuvastatin 40 mg daily  Diet modifications recommended to help lower saturated fat intake    No results found for this visit on 25.    Orders Placed This Encounter   Procedures   • Lipid Panel with Direct LDL reflex   • Comprehensive metabolic panel         Return in about 6 months (around 10/25/2025), or if symptoms worsen or fail to improve.      History of Present Illness   75 y.o. male , who previously worked as the asbestos removal coordinator, comes in as a new patient for consultation regarding abnormalities detected on ECG done during recent preop evaluation.    He  reports no active complaints of chest pain, shortness of breath, palpitations or dizziness.  No known prior history of coronary artery disease or heart failure.  He has had ongoing issues with leg pain and was found to have back issues, and needed spinal surgery for this.  He went through preop ECG through his PCP for this and was found to have ectopic beats on this.  He subsequently had an echocardiogram done and cardiology referral was given for preop evaluation.  Subsequently the echocardiogram was within normal limits and as a result decision was made to proceed with surgery.  He did not have any perioperative cardiac complications and is recovering well thereafter.  He is here today for routine consultation regarding these ectopic beats    He currently walks regularly with his dog doing about 1.5 to 3 miles, but admits that his dog stops a lot and goes quite slowly.    Family history  Father-  of heart attack at 52, while at a race track.   Mother -  of DM2 at 89. No known CAD   Siblings - youngest of 4 siblings  Brother - stroke in 70s. No known heart problems  No known CAD in siblings    Interval history - 7/10/2024  He returns for follow-up after completing Holter monitor.  He continues to feel well and has not had any symptoms in recent months.  No chest pain, palpitations, shortness of breath, dizziness or lightheadedness, near-syncope or syncope.  He remains compliant with medical therapy and remains active and without any major cardiac limitations    Interval history - 2025  Returns for follow-up with me after about 9 months.  In the interim, in 2025, while he was traveling to [place] he ended up having an episode of chest pain and went to the hospital.  He had a nuclear stress test done, which revealed inferior ischemia.  He was subsequently transferred to [facility] for cardiac catheterization, which subsequently confirmed 98% stenosis of RCA.  This was successfully  stented, and he has since followed up with a wire nurse practitioners here and has been set up for cardiac rehab.  He has been doing the same and has done well without any problems of chest pain.  He continues to be compliant with medical therapy and has not had any issues with bleeding      Historical Information   Past Medical History:   Diagnosis Date   • Hernia, inguinal, right 02/24/2021   • Hypertension    • Irregular heart beat    • Lung nodule      Past Surgical History:   Procedure Laterality Date   • BUNIONECTOMY Right    • COLONOSCOPY  2018   • HAND SURGERY Right     cyst excision   • TX THOMAS FACETECTOMY & FORAMOTOMY 1 VRT SGM LUMBAR Left 3/26/2024    Procedure: LEFT SIDED APPROACH L3-4 MIS LAMINECTOMY;  Surgeon: Bo Aguilar MD;  Location: [facility] MAIN OR;  Service: Neurosurgery   • TX RPR 1ST INGUN HRNA AGE 5 YRS/> REDUCIBLE Right 03/15/2021    Procedure: INGUINAL HERNIA REPAIR;  Surgeon: Shaun Pineda DO;  Location: [facility]IN [facility];  Service: General   • SHOULDER SURGERY Right 2018     Family History   Problem Relation Age of Onset   • Diabetes Mother    • Heart attack Father      Current Outpatient Medications   Medication Instructions   • aspirin 81 mg, Oral, Daily at bedtime   • atorvastatin (LIPITOR) 80 mg, Oral, Every evening   • Cholecalciferol (Vitamin D-3) 25 MCG (1000 UT) CAPS 1 capsule, Daily   • Cinnamon 500 MG capsule Take by mouth   • clopidogrel (PLAVIX) 75 mg, Oral, Daily   • diclofenac sodium (VOLTAREN) 50 mg, 2 times daily   • Diclofenac Sodium (VOLTAREN) 2 g, Topical, As needed   • ezetimibe (ZETIA) 10 mg, Oral, Daily   • finasteride (PROSCAR) 5 mg tablet TAKE 1/2 TABLET(2.5 MG) BY MOUTH DAILY AT BEDTIME   • gabapentin (NEURONTIN) 300 mg, Oral, 3 times daily   • losartan (COZAAR) 25 mg, Oral, Daily   • metoprolol succinate (TOPROL-XL) 25 mg, Oral, Daily   • Multiple Vitamin (MULTIVITAMIN ADULT PO) 1 tablet, Daily   • nitroglycerin (NITROSTAT) 0.4 mg, Every 5 minutes PRN   • Turmeric 400 MG  CAPS 1 capsule, Daily   • vitamin C 1,000 mg, Daily   • Zinc 50 MG TABS Take by mouth      No Known Allergies  Social History     Socioeconomic History   • Marital status:      Spouse name: None   • Number of children: None   • Years of education: None   • Highest education level: None   Occupational History   • None   Tobacco Use   • Smoking status: Former     Current packs/day: 0.00     Average packs/day: 1 pack/day for 10.0 years (10.0 ttl pk-yrs)     Types: Cigarettes     Start date:      Quit date:      Years since quittin.3   • Smokeless tobacco: Never   Vaping Use   • Vaping status: Never Used   Substance and Sexual Activity   • Alcohol use: Yes     Alcohol/week: 10.0 standard drinks of alcohol     Types: 10 Glasses of wine per week     Comment: a glass of wine or two a night    • Drug use: Never   • Sexual activity: Yes     Partners: Female   Other Topics Concern   • None   Social History Narrative    · Do you currently or have you served in the  [employer] Forces:   Yes      · If Yes, What branch of service:   [employer]      · Were you activated, into active duty, as a member of the National [employer] or as a Reservist:   No      Social Drivers of Health     Financial Resource Strain: Low Risk  (2023)    Overall Financial Resource Strain (CARDIA)    • Difficulty of Paying Living Expenses: Not very hard   Food Insecurity: No Food Insecurity (2025)    Received from [facility]    Hunger Vital Sign    • Worried About Running Out of Food in the Last Year: Never true    • Ran Out of Food in the Last Year: Never true   Transportation Needs: No Transportation Needs (2025)    Received from [facility] - Transportation    • Lack of Transportation (Medical): No    • Lack of Transportation (Non-Medical): No   Physical Activity: Not on file   Stress: Not on file   Social Connections: Not on file   Intimate Partner Violence: Not At Risk (2025)    Received from [facility]  "Health     IPV Inpatient Questions    • Does Anyone Try to Keep You From Having Contact with Others or Doing Things Outside Your Home?: no    • Feels Threatened by Someone: no    • Feels Unsafe at Home or Work/School: no    • Physical Signs of Abuse Present: no   Housing Stability: Low Risk  (1/6/2025)    [facility] from [facility]    Housing Stability Vital Sign    • Unable to Pay for Housing in the Last Year: No    • Number of Times Moved in the Last Year: 0    • Homeless in the Last Year: No        Review of Systems   All other systems reviewed and are negative.        Vitals:  Vitals:    04/25/25 1449   BP: 118/60   BP Location: Left arm   Patient Position: Sitting   Cuff Size: Standard   Pulse: 57   Temp: 97.9 °F (36.6 °C)   TempSrc: Tympanic   SpO2: 97%   Weight: 68.9 kg (152 lb)   Height: 5' 8\" (1.727 m)     BMI - Body mass index is 23.11 kg/m².  Wt Readings from Last 7 Encounters:   04/25/25 68.9 kg (152 lb)   01/22/25 68.6 kg (151 lb 3.2 oz)   08/01/24 67.1 kg (148 lb)   07/10/24 66.4 kg (146 lb 6.4 oz)   07/08/24 69.4 kg (153 lb)   07/03/24 69.4 kg (153 lb)   06/12/24 69.4 kg (153 lb)       Physical Exam  Vitals and nursing note reviewed.   Constitutional:       General: He is not in acute distress.     Appearance: Normal appearance. He is well-developed and normal weight. He is not ill-appearing or diaphoretic.   HENT:      Head: Normocephalic and atraumatic.      Nose: No congestion.   Eyes:      General: No scleral icterus.     Conjunctiva/sclera: Conjunctivae normal.   Neck:      Vascular: No carotid bruit or JVD.   Cardiovascular:      Rate and Rhythm: Normal rate and regular rhythm. Frequent Extrasystoles are present.     Heart sounds: Normal heart sounds. No murmur heard.     No friction rub. No gallop.   Pulmonary:      Effort: Pulmonary effort is normal. No respiratory distress.      Breath sounds: Normal breath sounds. No wheezing or rales.   Chest:      Chest wall: No tenderness. " "  Abdominal:      General: There is no distension.      Palpations: Abdomen is soft.      Tenderness: There is no abdominal tenderness.   Musculoskeletal:         General: No swelling, tenderness or deformity.      Cervical back: Neck supple. No muscular tenderness.      Right lower leg: No edema.      Left lower leg: No edema.   Skin:     General: Skin is warm.   Neurological:      General: No focal deficit present.      Mental Status: He is alert and oriented to person, place, and time. Mental status is at baseline.   Psychiatric:         Mood and Affect: Mood normal.         Behavior: Behavior normal.         Thought Content: Thought content normal.           Labs:  CBC:   Lab Results   Component Value Date    WBC 4.71 03/12/2024    RBC 4.79 03/12/2024    HGB 14.5 03/12/2024    HCT 44.8 03/12/2024    MCV 94 03/12/2024     03/12/2024    RDW 14.2 03/12/2024       CMP:   Lab Results   Component Value Date    K 4.7 12/19/2024     12/19/2024    CO2 30 12/19/2024    BUN 25 12/19/2024    CREATININE 1.06 12/19/2024    EGFR 69 12/19/2024    CALCIUM 9.7 12/19/2024    AST 19 12/19/2024    ALT 17 12/19/2024    ALKPHOS 51 12/19/2024       Magnesium:  No results found for: \"MG\"    Lipid Profile:   Lab Results   Component Value Date    HDL 41 04/09/2025    TRIG 77 04/09/2025    LDLCALC 100 04/09/2025       Thyroid Studies: No results found for: \"LBZ4QYHMCAQY\", \"T3FREE\", \"FREET4\", \"C6JKWVI\", \"B0DZVIN\"    A1c:  No components found for: \"HGA1C\"    INR:  Lab Results   Component Value Date    INR 1.03 03/12/2024   5    Cardiac testing:   No results found for this or any previous visit.    No results found for this or any previous visit.    No results found for this or any previous visit.    No results found for this or any previous visit.      CT chest wo contrast  Narrative: CT CHEST WITHOUT IV CONTRAST    INDICATION:   R91.8: Other nonspecific abnormal finding of lung field. Follow-up right upper lobe " nodule.    COMPARISON: CXR 1/28/2022 chest CT 4/25/2022, 2/3/2020.    TECHNIQUE: Chest CT without intravenous contrast.  Axial, sagittal, coronal 2D reformats and coronal MIPS from source data.    Radiation dose length product (DLP):  118 mGy-cm . Radiation dose exposure minimized using iterative reconstruction and automated exposure control.    FINDINGS:    LUNGS: No suspicious nodules. 5 x 3 mm elliptical opacity in the lateral right upper lobe with a linear density extending to the pleural surface, stable since February 2020, compatible with a benign intrapulmonary lymph node. Benign intrapulmonary lymph   node abutting the right major fissure (3/114). Stable benign posterior right upper lobe juxtapleural scar since February 2020.    AIRWAYS: No significant filling defects.    PLEURA:  Unremarkable.    HEART/GREAT VESSELS: Normal heart size. Moderate coronary artery calcification indicating atherosclerotic heart disease.    MEDIASTINUM AND CLEM:  Unremarkable.    CHEST WALL AND LOWER NECK: Mild gynecomastia.    UPPER ABDOMEN:  Unremarkable.    OSSEOUS STRUCTURES: Mild degenerative disease in the spine.  Impression: Benign 5 x 3 mm intrapulmonary lymph node right upper lobe, stable since February 2020. No follow-up needed.    Workstation performed: [id number]

## 2025-05-08 ENCOUNTER — RA CDI HCC (OUTPATIENT)
Dept: OTHER | Facility: HOSPITAL | Age: 75
End: 2025-05-08

## 2025-05-09 ENCOUNTER — OFFICE VISIT (OUTPATIENT)
Age: 75
End: 2025-05-09
Payer: MEDICARE

## 2025-05-09 VITALS
HEART RATE: 78 BPM | BODY MASS INDEX: 22.43 KG/M2 | OXYGEN SATURATION: 98 % | SYSTOLIC BLOOD PRESSURE: 120 MMHG | HEIGHT: 68 IN | DIASTOLIC BLOOD PRESSURE: 72 MMHG | WEIGHT: 148 LBS | TEMPERATURE: 97.8 F

## 2025-05-09 DIAGNOSIS — R73.03 PREDIABETES: ICD-10-CM

## 2025-05-09 DIAGNOSIS — Z12.5 SCREENING FOR PROSTATE CANCER: ICD-10-CM

## 2025-05-09 DIAGNOSIS — Z12.11 SCREENING FOR COLON CANCER: ICD-10-CM

## 2025-05-09 DIAGNOSIS — Z00.00 MEDICARE ANNUAL WELLNESS VISIT, SUBSEQUENT: Primary | ICD-10-CM

## 2025-05-09 DIAGNOSIS — M79.606 PAIN OF LOWER EXTREMITY, UNSPECIFIED LATERALITY: ICD-10-CM

## 2025-05-09 PROCEDURE — G0439 PPPS, SUBSEQ VISIT: HCPCS

## 2025-05-09 NOTE — ASSESSMENT & PLAN NOTE
Lab Results   Component Value Date    HGBA1C 6.4 (H) 03/12/2024     Repeat ordered  Orders:  •  Hemoglobin A1C; Future

## 2025-05-09 NOTE — PROGRESS NOTES
Name: Shaun Mohr      : 1950      MRN: [id number]  Encounter Provider: Jenny Lang DO  Encounter Date: 2025   [facility]  :  Assessment & Plan  Medicare annual wellness visit, subsequent  Reviewed recent lab work   Ordered A1C and PSA   Colonoscopy UTD but patient would like to be checked sooner, referral provided   Due for tetanus shot, advised to get at pharmacy and send us documentation        Screening for colon cancer  Not due till  however patient would like to get check sooner, referral provided   Orders:  •  Ambulatory Referral to Gastroenterology; Future    Pain of lower extremity, unspecified laterality  Occasional pain in legs/knee  Ordered refill   Orders:  •  diclofenac sodium (VOLTAREN) 50 mg EC tablet; Take 1 tablet (50 mg total) by mouth 2 (two) times a day as needed (leg pain)    Prediabetes  Lab Results   Component Value Date    HGBA1C 6.4 (H) 2024     Repeat ordered  Orders:  •  Hemoglobin A1C; Future    Screening for prostate cancer  Discussed risks, benefits   Would like to proceed with checking PSA, lab ordered   Orders:  •  PSA, Total Screen; Future         Preventive health issues were discussed with patient, and age appropriate screening tests were ordered as noted in patient's After Visit Summary. Personalized health advice and appropriate referrals for health education or preventive services given if needed, as noted in patient's After Visit Summary.    Nutrition Assessment and Intervention:     Reviewed and updated Nutrition Prescription      Physical Activity Assessment and Intervention:    Reviewed and updated Physical Activity Prescription with patient      Emotional and Mental Well-being, Sleep, Connectedness Assessment and Intervention:    Depression and anxiety screening performed and reviewed      Tobacco and Toxic Substance Assessment and Intervention:     Tobacco use screening performed    Alcohol  and drug use screening performed        History of Present Illness   {?Quick Links Encounters * My Last Note * Last Note in Specialty * Snapshot * Since Last Visit * History :46980}  HPI   Patient Care Team:  Jenny Lang DO as PCP - General (Family Medicine)    Review of Systems   Constitutional:  Negative for chills and fever.   HENT:  Negative for ear pain and sore throat.    Eyes:  Negative for pain and visual disturbance.   Respiratory:  Negative for cough and shortness of breath.    Cardiovascular:  Negative for chest pain and palpitations.   Gastrointestinal:  Negative for abdominal pain and vomiting.   Genitourinary:  Negative for dysuria and hematuria.   Musculoskeletal:  Negative for arthralgias and back pain.        Occasional leg pain   Skin:  Negative for color change and rash.   Neurological:  Negative for seizures and syncope.   All other systems reviewed and are negative.    Medical History Reviewed by provider this encounter:  Tobacco  Allergies  Meds  Problems  Med Hx  Surg Hx  Fam Hx       Annual Wellness Visit Questionnaire       Health Risk Assessment:   Patient rates overall health as very good. Patient feels that their physical health rating is slightly better. Patient is satisfied with their life. Eyesight was rated as same. Hearing was rated as same. Patient feels that their emotional and mental health rating is same. Patients states they are never, rarely angry. Patient states they are never, rarely unusually tired/fatigued. Pain experienced in the last 7 days has been none. Patient states that he has experienced no weight loss or gain in last 6 months.     Depression Screening:   PHQ-2 Score: 0      Fall Risk Screening:   In the past year, patient has experienced: no history of falling in past year      Home Safety:  Patient does not have trouble with stairs inside or outside of their home. Patient has working smoke alarms and has working carbon monoxide detector. Home safety  hazards include: none.     Nutrition:   Current diet is Regular.     Medications:   Patient is currently taking over-the-counter supplements. OTC medications include: see medication list. Patient is able to manage medications.     Activities of Daily Living (ADLs)/Instrumental Activities of Daily Living (IADLs):   Walk and transfer into and out of bed and chair?: Yes  Dress and groom yourself?: Yes    Bathe or shower yourself?: Yes    Feed yourself? Yes  Do your laundry/housekeeping?: Yes  Manage your money, pay your bills and track your expenses?: Yes  Make your own meals?: Yes    Do your own shopping?: Yes    Previous Hospitalizations:   Any hospitalizations or ED visits within the last 12 months?: Yes    How many hospitalizations have you had in the last year?: 1-2    Hospitalization Comments: Had a stent installed    Advance Care Planning:   Living will: Yes    Durable POA for healthcare: Yes    Advanced directive: Yes      Preventive Screenings      Cardiovascular Screening:    General: Screening Not Indicated and History Lipid Disorder      Diabetes Screening:     General: Screening Current      Colorectal Cancer Screening:     General: Screening Current      Prostate Cancer Screening:    General: Risks and Benefits Discussed      Abdominal Aortic Aneurysm (AAA) Screening:    Risk factors include: age between 65-74 yo and tobacco use        Lung Cancer Screening:     General: Screening Not Indicated      Hepatitis C Screening:    General: Screening Current    Screening, Brief Intervention, and Referral to Treatment (SBIRT)     Screening  Typical number of drinks in a day: 1  Typical number of drinks in a week: 7  Interpretation: Low risk drinking behavior.    AUDIT-C Screenin) How often did you have a drink containing alcohol in the past year? 4 or more times a week  2) How many drinks did you have on a typical day when you were drinking in the past year? 1 to 2  3) How often did you have 6 or more drinks  on one occasion in the past year? never    AUDIT-C Score: 4  Interpretation: Score 4-12 (male): POSITIVE screen for alcohol misuse    AUDIT Screenin) How often during the last year have you found that you were not able to stop drinking once you had started? 0 - never  5) How often during the last year have you failed to do what was normally expected from you because of drinking? 0 - never  6) How often during the last year have you needed a first drink in the morning to get yourself going after a heavy drinking session? 0 - never  7) How often during the last year have you had a feeling of guilt or remorse after drinking? 0 - never  8) How often during the last year have you been unable to remember what happened the night before because you had been drinking? 0 - never  9) Have you or someone else been injured as a result of your drinking? 0 - no  10) Has a relative or friend or a doctor or another health worker been concerned about your drinking or suggested you cut down? 0 - no    AUDIT Score: 4  Interpretation: Low risk alcohol consumption    Single Item Drug Screening:  How often have you used an illegal drug (including marijuana) or a prescription medication for non-medical reasons in the past year? never    Single Item Drug Screen Score: 0  Interpretation: Negative screen for possible drug use disorder    Social Drivers of Health     Financial Resource Strain: Low Risk  (2023)    Overall Financial Resource Strain ([facility])    • Difficulty of Paying Living Expenses: Not very hard   Food Insecurity: No Food Insecurity (2025)    Hunger Vital Sign    • Worried About Running Out of Food in the Last Year: Never true    • Ran Out of Food in the Last Year: Never true   Transportation Needs: No Transportation Needs (2025)    PRAPARE - Transportation    • Lack of Transportation (Medical): No    • Lack of Transportation (Non-Medical): No   Housing Stability: Low Risk  (2025)    Housing Stability  "Vital Sign    • Unable to Pay for Housing in the Last Year: No    • Number of Times Moved in the Last Year: 0    • Homeless in the Last Year: No   Utilities: Not At Risk (5/5/2025)    [facility] Utilities    • Threatened with loss of utilities: No     No results found.    Objective {?Quick Links Trend Vitals * Enter New Vitals * Results Review * Timeline (Adult) * Labs * Imaging * Cardiology * Procedures * Lung Cancer Screening * Surgical eConsent :35958}  /72   Pulse 78   Temp 97.8 °F (36.6 °C)   Ht 5' 8\" (1.727 m)   Wt 67.1 kg (148 lb)   SpO2 98%   BMI 22.50 kg/m²     Physical Exam  Vitals reviewed.   Constitutional:       Appearance: Normal appearance.   HENT:      Head: Normocephalic and atraumatic.      Right Ear: External ear normal.      Left Ear: External ear normal.      Nose: Nose normal.      Mouth/Throat:      Pharynx: Oropharynx is clear.   Eyes:      Conjunctiva/sclera: Conjunctivae normal.      Pupils: Pupils are equal, round, and reactive to light.   Cardiovascular:      Rate and Rhythm: Normal rate and regular rhythm.      Pulses: Normal pulses.      Heart sounds: Normal heart sounds.   Pulmonary:      Effort: Pulmonary effort is normal.      Breath sounds: Normal breath sounds.   Abdominal:      General: Abdomen is flat.      Palpations: Abdomen is soft.   Musculoskeletal:      Cervical back: Neck supple.      Right lower leg: No edema.      Left lower leg: No edema.   Skin:     General: Skin is warm.   Neurological:      Mental Status: He is alert and oriented to person, place, and time. Mental status is at baseline.   Psychiatric:         Mood and Affect: Mood normal.         Behavior: Behavior normal.       "

## 2025-05-09 NOTE — PROGRESS NOTES
Name: Shaun Mohr      : 1950      MRN: [id number]  Encounter Provider: Jenny Lang DO  Encounter Date: 2025   [facility]  :  Assessment & Plan  Medicare annual wellness visit, subsequent         Screening for colon cancer    Orders:  •  Ambulatory Referral to Gastroenterology; Future    Pain of lower extremity, unspecified laterality    Orders:  •  diclofenac sodium (VOLTAREN) 50 mg EC tablet; Take 1 tablet (50 mg total) by mouth 2 (two) times a day as needed (leg pain)    Prediabetes    Orders:  •  Hemoglobin A1C; Future    Screening for prostate cancer    Orders:  •  PSA, Total Screen; Future       Preventive health issues were discussed with patient, and age appropriate screening tests were ordered as noted in patient's After Visit Summary. Personalized health advice and appropriate referrals for health education or preventive services given if needed, as noted in patient's After Visit Summary.    History of Present Illness   {?Quick Links Encounters * My Last Note * Last Note in Specialty * Snapshot * Since Last Visit * History :00241}  HPI   Patient Care Team:  Jenny Lang DO as PCP - General (Family Medicine)    Review of Systems  Medical History Reviewed by provider this encounter:  Tobacco  Allergies  Meds  Problems  Med Hx  Surg Hx  Fam Hx       Annual Wellness Visit Questionnaire   Shaun is here for his Subsequent Wellness visit. Last Medicare Wellness visit information reviewed, patient interviewed and updates made to the record today.      Health Risk Assessment:   Patient rates overall health as very good. Patient feels that their physical health rating is slightly better. Patient is satisfied with their life. Eyesight was rated as same. Hearing was rated as same. Patient feels that their emotional and mental health rating is same. Patients states they are never, rarely angry. Patient states they are never, rarely  unusually tired/fatigued. Pain experienced in the last 7 days has been none. Patient states that he has experienced no weight loss or gain in last 6 months.     Depression Screening:   PHQ-2 Score: 0      Fall Risk Screening:   In the past year, patient has experienced: no history of falling in past year      Home Safety:  Patient does not have trouble with stairs inside or outside of their home. Patient has working smoke alarms and has working carbon monoxide detector. Home safety hazards include: none.     Nutrition:   Current diet is Regular.     Medications:   Patient is currently taking over-the-counter supplements. OTC medications include: see medication list. Patient is able to manage medications.     Activities of Daily Living (ADLs)/Instrumental Activities of Daily Living (IADLs):   Walk and transfer into and out of bed and chair?: Yes  Dress and groom yourself?: Yes    Bathe or shower yourself?: Yes    Feed yourself? Yes  Do your laundry/housekeeping?: Yes  Manage your money, pay your bills and track your expenses?: Yes  Make your own meals?: Yes    Do your own shopping?: Yes    Previous Hospitalizations:   Any hospitalizations or ED visits within the last 12 months?: Yes    How many hospitalizations have you had in the last year?: 1-2    Hospitalization Comments: Had a stent installed    Advance Care Planning:   Living will: Yes    Durable POA for healthcare: Yes    Advanced directive: Yes      Preventive Screenings      Cardiovascular Screening:    General: Screening Not Indicated and History Lipid Disorder      Diabetes Screening:     General: Screening Current      Colorectal Cancer Screening:     General: Screening Current      Abdominal Aortic Aneurysm (AAA) Screening:    Risk factors include: age between 65-74 yo and tobacco use        Lung Cancer Screening:     General: Screening Not Indicated      Hepatitis C Screening:    General: Screening Current    Screening, Brief Intervention, and Referral  to Treatment (SBIRT)     Screening  Typical number of drinks in a day: 1  Typical number of drinks in a week: 7  Interpretation: Low risk drinking behavior.    AUDIT-C Screenin) How often did you have a drink containing alcohol in the past year? 4 or more times a week  2) How many drinks did you have on a typical day when you were drinking in the past year? 1 to 2  3) How often did you have 6 or more drinks on one occasion in the past year? never    AUDIT-C Score: 4  Interpretation: Score 4-12 (male): POSITIVE screen for alcohol misuse    AUDIT Screenin) How often during the last year have you found that you were not able to stop drinking once you had started? 0 - never  5) How often during the last year have you failed to do what was normally expected from you because of drinking? 0 - never  6) How often during the last year have you needed a first drink in the morning to get yourself going after a heavy drinking session? 0 - never  7) How often during the last year have you had a feeling of guilt or remorse after drinking? 0 - never  8) How often during the last year have you been unable to remember what happened the night before because you had been drinking? 0 - never  9) Have you or someone else been injured as a result of your drinking? 0 - no  10) Has a relative or friend or a doctor or another health worker been concerned about your drinking or suggested you cut down? 0 - no    AUDIT Score: 4  Interpretation: Low risk alcohol consumption    Single Item Drug Screening:  How often have you used an illegal drug (including marijuana) or a prescription medication for non-medical reasons in the past year? never    Single Item Drug Screen Score: 0  Interpretation: Negative screen for possible drug use disorder    Social Drivers of Health     Financial Resource Strain: Low Risk  (2023)    Overall Financial Resource Strain ([facility])    • Difficulty of Paying Living Expenses: Not very hard   Food  "Insecurity: No Food Insecurity (5/5/2025)    Hunger Vital Sign    • Worried About Running Out of Food in the Last Year: Never true    • Ran Out of Food in the Last Year: Never true   Transportation Needs: No Transportation Needs (5/5/2025)    PRAPARE - Transportation    • Lack of Transportation (Medical): No    • Lack of Transportation (Non-Medical): No   Housing Stability: Low Risk  (5/5/2025)    Housing Stability Vital Sign    • Unable to Pay for Housing in the Last Year: No    • Number of Times Moved in the Last Year: 0    • Homeless in the Last Year: No   Utilities: Not At Risk (5/5/2025)    [facility] Utilities    • Threatened with loss of utilities: No     No results found.    Objective {?Quick Links Trend Vitals * Enter New Vitals * Results Review * Timeline (Adult) * Labs * Imaging * Cardiology * Procedures * Lung Cancer Screening * Surgical eConsent :03519}  /72   Pulse 78   Temp 97.8 °F (36.6 °C)   Ht 5' 8\" (1.727 m)   Wt 67.1 kg (148 lb)   SpO2 98%   BMI 22.50 kg/m²     Physical Exam  {Administrative / Billing Section (Optional):29817}  "

## 2025-05-09 NOTE — PATIENT INSTRUCTIONS
Dr. Llanos's office number: [id number]  Medicare Preventive Visit Patient Instructions  Thank you for completing your Welcome to Medicare Visit or Medicare Annual Wellness Visit today. Your next wellness visit will be due in one year (5/10/2026).  The screening/preventive services that you may require over the next 5-10 years are detailed below. Some tests may not apply to you based off risk factors and/or age. Screening tests ordered at today's visit but not completed yet may show as past due. Also, please note that scanned in results may not display below.  Preventive Screenings:  Service Recommendations Previous Testing/Comments   Colorectal Cancer Screening  Colonoscopy    Fecal Occult Blood Test (FOBT)/Fecal Immunochemical Test (FIT)  Fecal DNA/Cologuard Test  Flexible Sigmoidoscopy Age: 45-75 years old   Colonoscopy: every 10 years (May be performed more frequently if at higher risk)  OR  FOBT/FIT: every 1 year  OR  Cologuard: every 3 years  OR  Sigmoidoscopy: every 5 years  Screening may be recommended earlier than age 45 if at higher risk for colorectal cancer. Also, an individualized decision between you and your healthcare provider will decide whether screening between the ages of 76-85 would be appropriate. Colonoscopy: 08/22/2018  FOBT/FIT: Not on file  Cologuard: Not on file  Sigmoidoscopy: Not on file    Screening Current     Prostate Cancer Screening Individualized decision between patient and health care provider in men between ages of 55-69   Medicare will cover every 12 months beginning on the day after your 50th birthday PSA: 0.24 ng/mL           Hepatitis C Screening Once for adults born between 1945 and 1965  More frequently in patients at high risk for Hepatitis C Hep C Antibody: 02/25/2021    Screening Current   Diabetes Screening 1-2 times per year if you're at risk for diabetes or have pre-diabetes Fasting glucose: 132 mg/dL (12/19/2024)  A1C: 6.4 % (3/12/2024)  Screening Current    Cholesterol Screening Once every 5 years if you don't have a lipid disorder. May order more often based on risk factors. Lipid panel: 04/09/2025  Screening Not Indicated  History Lipid Disorder      Other Preventive Screenings Covered by Medicare:  Abdominal Aortic Aneurysm (AAA) Screening: covered once if your at risk. You're considered to be at risk if you have a family history of AAA or a male between the age of 65-75 who smoking at least 100 cigarettes in your lifetime.  Lung Cancer Screening: covers low dose CT scan once per year if you meet all of the following conditions: (1) Age 55-77; (2) No signs or symptoms of lung cancer; (3) Current smoker or have quit smoking within the last 15 years; (4) You have a tobacco smoking history of at least 20 pack years (packs per day x number of years you smoked); (5) You get a written order from a healthcare provider.  Glaucoma Screening: covered annually if you're considered high risk: (1) You have diabetes OR (2) Family history of glaucoma OR (3)  aged 50 and older OR (4)  American aged 65 and older  Osteoporosis Screening: covered every 2 years if you meet one of the following conditions: (1) Have a vertebral abnormality; (2) On glucocorticoid therapy for more than 3 months; (3) Have primary hyperparathyroidism; (4) On osteoporosis medications and need to assess response to drug therapy.  HIV Screening: covered annually if you're between the age of 15-65. Also covered annually if you are younger than 15 and older than 65 with risk factors for HIV infection. For pregnant patients, it is covered up to 3 times per pregnancy.    Immunizations:  Immunization Recommendations   Influenza Vaccine Annual influenza vaccination during flu season is recommended for all persons aged >= 6 months who do not have contraindications   Pneumococcal Vaccine   * Pneumococcal conjugate vaccine = PCV13 (Prevnar 13), PCV15 (Vaxneuvance), PCV20 (Prevnar 20)  *  Pneumococcal polysaccharide vaccine = PPSV23 (Pneumovax) Adults 19-63 yo with certain risk factors or if 65+ yo  If never received any pneumonia vaccine: recommend Prevnar 20 (PCV20)  Give PCV20 if previously received 1 dose of PCV13 or PPSV23   Hepatitis B Vaccine 3 dose series if at intermediate or high risk (ex: diabetes, end stage renal disease, liver disease)   Respiratory syncytial virus (RSV) Vaccine - COVERED BY MEDICARE PART D  * RSVPreF3 (Arexvy) CDC recommends that adults 60 years of age and older may receive a single dose of RSV vaccine using shared clinical decision-making (SCDM)   Tetanus (Td) Vaccine - COST NOT COVERED BY MEDICARE PART B Following completion of primary series, a booster dose should be given every 10 years to maintain immunity against tetanus. Td may also be given as tetanus wound prophylaxis.   Tdap Vaccine - COST NOT COVERED BY MEDICARE PART B Recommended at least once for all adults. For pregnant patients, recommended with each pregnancy.   Shingles Vaccine (Shingrix) - COST NOT COVERED BY MEDICARE PART B  2 shot series recommended in those 19 years and older who have or will have weakened immune systems or those 50 years and older     Health Maintenance Due:      Topic Date Due   • Colorectal Cancer Screening  08/22/2028   • Hepatitis C Screening  Completed     Immunizations Due:      Topic Date Due   • IPV Vaccine (2 of 3 - Adult catch-up series) 12/29/1970   • COVID-19 Vaccine (5 - 2024-25 season) 09/01/2024     Advance Directives   What are advance directives?  Advance directives are legal documents that state your wishes and plans for medical care. These plans are made ahead of time in case you lose your ability to make decisions for yourself. Advance directives can apply to any medical decision, such as the treatments you want, and if you want to donate organs.   What are the types of advance directives?  There are many types of advance directives, and each state has rules  about how to use them. You may choose a combination of any of the following:  Living will:  This is a written record of the treatment you want. You can also choose which treatments you do not want, which to limit, and which to stop at a certain time. This includes surgery, medicine, IV fluid, and tube feedings.   Durable power of  for healthcare (DPAHC):  This is a written record that states who you want to make healthcare choices for you when you are unable to make them for yourself. This person, called a proxy, is usually a family member or a friend. You may choose more than 1 proxy.  Do not resuscitate (DNR) order:  A DNR order is used in case your heart stops beating or you stop breathing. It is a request not to have certain forms of treatment, such as CPR. A DNR order may be included in other types of advance directives.  Medical directive:  This covers the care that you want if you are in a coma, near death, or unable to make decisions for yourself. You can list the treatments you want for each condition. Treatment may include pain medicine, surgery, blood transfusions, dialysis, IV or tube feedings, and a ventilator (breathing machine).  Values history:  This document has questions about your views, beliefs, and how you feel and think about life. This information can help others choose the care that you would choose.  Why are advance directives important?  An advance directive helps you control your care. Although spoken wishes may be used, it is better to have your wishes written down. Spoken wishes can be misunderstood, or not followed. Treatments may be given even if you do not want them. An advance directive may make it easier for your family to make difficult choices about your care.   Alcohol Use and Your Health    Drinking too much can harm your health.  Excessive alcohol use leads to about 88,000 death in the [place] each year, and shortens the life of those who diet by almost 30 years.   "Further, excessive drinking cost the economy $249 billion in 2010.  Most excessive drinkers are not alcohol dependent.    Excessive alcohol use has immediate effects that increase the risk of many harmful health conditions.  These are most often the result of binge drinking.  Over time, excessive alcohol use can lead to the development of chronic diseases and other series health problems.    What is considered a \"drink\"?        Excessive alcohol use includes:  Binge Drinking: For women, 4 or more drinks consumed on one occasion. For men, 5 or more drinks consumed on one occasion.  Heavy Drinking: For women, 8 or more drinks per week. For men, 15 or more drinks per week  Any alcohol used by pregnant women  Any alcohol used by those under the age of 21 years    If you choose to drink, do so in moderation:  Do not drink at all if you are under the age of 21, or if you are or may be pregnant, or have health problems that could be made worse by drinking.  For women, up to 1 drink per day  For men, up to 2 drinks a day    No one should begin drinking or drink more frequently based on potential health benefits    Short-Term Health Risks:  Injuries: motor vehicle crashes, falls, drownings, burns  Violence: homicide, suicide, sexual assault, intimate partner violence  Alcohol poisoning  Reproductive health: risky sexual behaviors, unintended prengnacy, sexually transmitted diseases, miscarriage, stillbirth, fetal alcohol syndrome    Long-Term Health Risks:  Chronic diseases: high blood pressure, heart disease, stroke, liver disease, digestive problems  Cancers: breast, mouth and throat, liver, colon  Learning and memory problems: dementia, poor school performance  Mental health: depression, anxiety, insomnia  Social problems: lost productivity, family problems, unemployment  Alcohol dependence    For support and more information:  Substance Abuse and Mental Health Services Administration  [address]  [place] , [place] " [address]  Web Address: http://www.[facility].gov    Alcoholics Anonymous        Web Address: http://www.aa.org    https://www.cdc.gov/alcohol/fact-sheets/alcohol-use.htm     © Copyright [employer] 2018 Information is for End User's use only and may not be sold, redistributed or otherwise used for commercial purposes. All illustrations and images included in CareNotes® are the copyrighted property of [employer] or [employer]    Medicare Preventive Visit Patient Instructions  Thank you for completing your Welcome to Medicare Visit or Medicare Annual Wellness Visit today. Your next wellness visit will be due in one year (5/10/2026).  The screening/preventive services that you may require over the next 5-10 years are detailed below. Some tests may not apply to you based off risk factors and/or age. Screening tests ordered at today's visit but not completed yet may show as past due. Also, please note that scanned in results may not display below.  Preventive Screenings:  Service Recommendations Previous Testing/Comments   Colorectal Cancer Screening  Colonoscopy    Fecal Occult Blood Test (FOBT)/Fecal Immunochemical Test (FIT)  Fecal DNA/Cologuard Test  Flexible Sigmoidoscopy Age: 45-75 years old   Colonoscopy: every 10 years (May be performed more frequently if at higher risk)  OR  FOBT/FIT: every 1 year  OR  Cologuard: every 3 years  OR  Sigmoidoscopy: every 5 years  Screening may be recommended earlier than age 45 if at higher risk for colorectal cancer. Also, an individualized decision between you and your healthcare provider will decide whether screening between the ages of 76-85 would be appropriate. Colonoscopy: 08/22/2018  FOBT/FIT: Not on file  Cologuard: Not on file  Sigmoidoscopy: Not on file    Screening Current     Prostate Cancer Screening Individualized decision between patient and health care provider in men between ages of 55-69   Medicare will cover every 12 months beginning on the day  after your 50th birthday PSA: 0.24 ng/mL           Hepatitis C Screening Once for adults born between 1945 and 1965  More frequently in patients at high risk for Hepatitis C Hep C Antibody: 02/25/2021    Screening Current   Diabetes Screening 1-2 times per year if you're at risk for diabetes or have pre-diabetes Fasting glucose: 132 mg/dL (12/19/2024)  A1C: 6.4 % (3/12/2024)  Screening Current   Cholesterol Screening Once every 5 years if you don't have a lipid disorder. May order more often based on risk factors. Lipid panel: 04/09/2025  Screening Not Indicated  History Lipid Disorder      Other Preventive Screenings Covered by Medicare:  Abdominal Aortic Aneurysm (AAA) Screening: covered once if your at risk. You're considered to be at risk if you have a family history of AAA or a male between the age of 65-75 who smoking at least 100 cigarettes in your lifetime.  Lung Cancer Screening: covers low dose CT scan once per year if you meet all of the following conditions: (1) Age 55-77; (2) No signs or symptoms of lung cancer; (3) Current smoker or have quit smoking within the last 15 years; (4) You have a tobacco smoking history of at least 20 pack years (packs per day x number of years you smoked); (5) You get a written order from a healthcare provider.  Glaucoma Screening: covered annually if you're considered high risk: (1) You have diabetes OR (2) Family history of glaucoma OR (3)  aged 50 and older OR (4)  American aged 65 and older  Osteoporosis Screening: covered every 2 years if you meet one of the following conditions: (1) Have a vertebral abnormality; (2) On glucocorticoid therapy for more than 3 months; (3) Have primary hyperparathyroidism; (4) On osteoporosis medications and need to assess response to drug therapy.  HIV Screening: covered annually if you're between the age of 15-65. Also covered annually if you are younger than 15 and older than 65 with risk factors for HIV  infection. For pregnant patients, it is covered up to 3 times per pregnancy.    Immunizations:  Immunization Recommendations   Influenza Vaccine Annual influenza vaccination during flu season is recommended for all persons aged >= 6 months who do not have contraindications   Pneumococcal Vaccine   * Pneumococcal conjugate vaccine = PCV13 (Prevnar 13), PCV15 (Vaxneuvance), PCV20 (Prevnar 20)  * Pneumococcal polysaccharide vaccine = PPSV23 (Pneumovax) Adults 19-65 yo with certain risk factors or if 65+ yo  If never received any pneumonia vaccine: recommend Prevnar 20 (PCV20)  Give PCV20 if previously received 1 dose of PCV13 or PPSV23   Hepatitis B Vaccine 3 dose series if at intermediate or high risk (ex: diabetes, end stage renal disease, liver disease)   Respiratory syncytial virus (RSV) Vaccine - COVERED BY MEDICARE PART D  * RSVPreF3 (Arexvy) CDC recommends that adults 60 years of age and older may receive a single dose of RSV vaccine using shared clinical decision-making (SCDM)   Tetanus (Td) Vaccine - COST NOT COVERED BY MEDICARE PART B Following completion of primary series, a booster dose should be given every 10 years to maintain immunity against tetanus. Td may also be given as tetanus wound prophylaxis.   Tdap Vaccine - COST NOT COVERED BY MEDICARE PART B Recommended at least once for all adults. For pregnant patients, recommended with each pregnancy.   Shingles Vaccine (Shingrix) - COST NOT COVERED BY MEDICARE PART B  2 shot series recommended in those 19 years and older who have or will have weakened immune systems or those 50 years and older     Health Maintenance Due:      Topic Date Due   • Colorectal Cancer Screening  08/22/2028   • Hepatitis C Screening  Completed     Immunizations Due:      Topic Date Due   • IPV Vaccine (2 of 3 - Adult catch-up series) 12/29/1970   • COVID-19 Vaccine (5 - 2024-25 season) 09/01/2024     Advance Directives   What are advance directives?  Advance directives are legal  documents that state your wishes and plans for medical care. These plans are made ahead of time in case you lose your ability to make decisions for yourself. Advance directives can apply to any medical decision, such as the treatments you want, and if you want to donate organs.   What are the types of advance directives?  There are many types of advance directives, and each state has rules about how to use them. You may choose a combination of any of the following:  Living will:  This is a written record of the treatment you want. You can also choose which treatments you do not want, which to limit, and which to stop at a certain time. This includes surgery, medicine, IV fluid, and tube feedings.   Durable power of  for healthcare (DPAHC):  This is a written record that states who you want to make healthcare choices for you when you are unable to make them for yourself. This person, called a proxy, is usually a family member or a friend. You may choose more than 1 proxy.  Do not resuscitate (DNR) order:  A DNR order is used in case your heart stops beating or you stop breathing. It is a request not to have certain forms of treatment, such as CPR. A DNR order may be included in other types of advance directives.  Medical directive:  This covers the care that you want if you are in a coma, near death, or unable to make decisions for yourself. You can list the treatments you want for each condition. Treatment may include pain medicine, surgery, blood transfusions, dialysis, IV or tube feedings, and a ventilator (breathing machine).  Values history:  This document has questions about your views, beliefs, and how you feel and think about life. This information can help others choose the care that you would choose.  Why are advance directives important?  An advance directive helps you control your care. Although spoken wishes may be used, it is better to have your wishes written down. Spoken wishes can be  "misunderstood, or not followed. Treatments may be given even if you do not want them. An advance directive may make it easier for your family to make difficult choices about your care.   Alcohol Use and Your Health    Drinking too much can harm your health.  Excessive alcohol use leads to about 88,000 death in the [place] each year, and shortens the life of those who diet by almost 30 years.  Further, excessive drinking cost the economy $249 billion in 2010.  Most excessive drinkers are not alcohol dependent.    Excessive alcohol use has immediate effects that increase the risk of many harmful health conditions.  These are most often the result of binge drinking.  Over time, excessive alcohol use can lead to the development of chronic diseases and other series health problems.    What is considered a \"drink\"?        Excessive alcohol use includes:  Binge Drinking: For women, 4 or more drinks consumed on one occasion. For men, 5 or more drinks consumed on one occasion.  Heavy Drinking: For women, 8 or more drinks per week. For men, 15 or more drinks per week  Any alcohol used by pregnant women  Any alcohol used by those under the age of 21 years    If you choose to drink, do so in moderation:  Do not drink at all if you are under the age of 21, or if you are or may be pregnant, or have health problems that could be made worse by drinking.  For women, up to 1 drink per day  For men, up to 2 drinks a day    No one should begin drinking or drink more frequently based on potential health benefits    Short-Term Health Risks:  Injuries: motor vehicle crashes, falls, drownings, burns  Violence: homicide, suicide, sexual assault, intimate partner violence  Alcohol poisoning  Reproductive health: risky sexual behaviors, unintended prengnacy, sexually transmitted diseases, miscarriage, stillbirth, fetal alcohol syndrome    Long-Term Health Risks:  Chronic diseases: high blood pressure, heart disease, stroke, liver " disease, digestive problems  Cancers: breast, mouth and throat, liver, colon  Learning and memory problems: dementia, poor school performance  Mental health: depression, anxiety, insomnia  Social problems: lost productivity, family problems, unemployment  Alcohol dependence    For support and more information:  Substance Abuse and Mental Health Services Administration  [address]  [place] [address]  Web Address: http://www.[facility].gov    Alcoholics Anonymous        Web Address: http://www.aa.org    https://www.cdc.gov/alcohol/fact-sheets/alcohol-use.htm     © Copyright [employer] 2018 Information is for End User's use only and may not be sold, redistributed or otherwise used for commercial purposes. All illustrations and images included in CareNotes® are the copyrighted property of [employer] Inc. or [employer]

## 2025-05-20 ENCOUNTER — APPOINTMENT (OUTPATIENT)
Dept: LAB | Facility: AMBULARY SURGERY CENTER | Age: 75
End: 2025-05-20
Payer: MEDICARE

## 2025-05-20 DIAGNOSIS — Z12.5 SCREENING FOR PROSTATE CANCER: ICD-10-CM

## 2025-05-20 DIAGNOSIS — Z95.5 S/P PRIMARY ANGIOPLASTY WITH CORONARY STENT: ICD-10-CM

## 2025-05-20 DIAGNOSIS — Z82.49 FAMILY HISTORY OF EARLY CAD: ICD-10-CM

## 2025-05-20 DIAGNOSIS — R73.03 PREDIABETES: ICD-10-CM

## 2025-05-20 DIAGNOSIS — E78.5 HYPERLIPIDEMIA, UNSPECIFIED HYPERLIPIDEMIA TYPE: ICD-10-CM

## 2025-05-20 LAB
EST. AVERAGE GLUCOSE BLD GHB EST-MCNC: 134 MG/DL
HBA1C MFR BLD: 6.3 %
PSA SERPL-MCNC: 0.3 NG/ML (ref 0–4)

## 2025-05-20 PROCEDURE — G0103 PSA SCREENING: HCPCS

## 2025-05-20 PROCEDURE — 83036 HEMOGLOBIN GLYCOSYLATED A1C: CPT

## 2025-05-20 PROCEDURE — 36415 COLL VENOUS BLD VENIPUNCTURE: CPT

## 2025-05-23 ENCOUNTER — RESULTS FOLLOW-UP (OUTPATIENT)
Age: 75
End: 2025-05-23

## 2025-05-23 NOTE — RESULT ENCOUNTER NOTE
Please call patient to let him know that his PSA, which is a prostate marker, was within normal limits.  His A1c, which looks at your sugar levels for the past 3 months, was in the prediabetes range.  I would recommend reducing simple carbohydrates, such as white rice, white pasta, and white bread, and switching to more complex carbohydrates, such as multigrain and whole-wheat.  Increasing exercise to at least 150 minutes/week can also help in controlling blood sugar levels.  We can repeat this test at his next annual physical.  Disqust message sent.  Please let me know if he has any questions.    Thanks!    Dr. Lang

## 2025-06-02 DIAGNOSIS — N40.0 BENIGN PROSTATIC HYPERPLASIA, UNSPECIFIED WHETHER LOWER URINARY TRACT SYMPTOMS PRESENT: ICD-10-CM

## 2025-06-02 RX ORDER — FINASTERIDE 5 MG/1
TABLET, FILM COATED ORAL
Qty: 45 TABLET | Refills: 1 | Status: SHIPPED | OUTPATIENT
Start: 2025-06-02

## 2025-06-19 ENCOUNTER — CONSULT (OUTPATIENT)
Dept: GASTROENTEROLOGY | Facility: AMBULARY SURGERY CENTER | Age: 75
End: 2025-06-19
Payer: MEDICARE

## 2025-06-19 VITALS
DIASTOLIC BLOOD PRESSURE: 64 MMHG | WEIGHT: 151.2 LBS | SYSTOLIC BLOOD PRESSURE: 114 MMHG | HEIGHT: 68 IN | BODY MASS INDEX: 22.91 KG/M2 | OXYGEN SATURATION: 96 % | HEART RATE: 56 BPM

## 2025-06-19 DIAGNOSIS — R19.8 IRREGULAR BOWEL HABITS: Primary | ICD-10-CM

## 2025-06-19 DIAGNOSIS — Z12.11 SCREENING FOR COLON CANCER: ICD-10-CM

## 2025-06-19 DIAGNOSIS — Z83.719 FAMILY HISTORY OF COLONIC POLYPS: ICD-10-CM

## 2025-06-19 PROCEDURE — 99204 OFFICE O/P NEW MOD 45 MIN: CPT

## 2025-06-19 RX ORDER — SODIUM CHLORIDE, SODIUM LACTATE, POTASSIUM CHLORIDE, CALCIUM CHLORIDE 600; 310; 30; 20 MG/100ML; MG/100ML; MG/100ML; MG/100ML
125 INJECTION, SOLUTION INTRAVENOUS CONTINUOUS
OUTPATIENT
Start: 2025-06-19

## 2025-06-19 RX ORDER — POLYETHYLENE GLYCOL 3350, SODIUM SULFATE ANHYDROUS, SODIUM BICARBONATE, SODIUM CHLORIDE, POTASSIUM CHLORIDE 236; 22.74; 6.74; 5.86; 2.97 G/4L; G/4L; G/4L; G/4L; G/4L
4000 POWDER, FOR SOLUTION ORAL ONCE
Qty: 4000 ML | Refills: 0 | Status: SHIPPED | OUTPATIENT
Start: 2025-06-19 | End: 2025-06-19

## 2025-06-19 NOTE — PATIENT INSTRUCTIONS
Scheduled date of colonoscopy (as of today): Sept 15, 2025  Physician performing colonoscopy: Dr Llanos   Location of colonoscopy: [facility]  Bowel prep reviewed with patient: Golytely   Instructions reviewed with patient by: PHILL   Clearances: Plavix- Dr Pichardo

## 2025-06-19 NOTE — PROGRESS NOTES
Name: Shaun Mohr      : 1950      MRN: [id number]  Encounter Provider: Martha Andres PA-C  Encounter Date: 2025   Encounter department: [facility]  :  Assessment & Plan  Irregular bowel habits  Patient reports a recent change in his bowel habits with increased constipation then with subsequent soft/loose stools.  Previously felt his bowels were more regular.  denies any rectal bleeding, rectal pain, hematochezia.  No known family history of IBD or colon cancer.  Brother and sister both with colon polyps as below.    -Recommend colonoscopy to evaluate change in bowel habits  -GoLytely bowel prep  -Patient is on Plavix.  Will obtain clearance.  -Discussed risks of procedure including risk of bleeding, infection, perforation, missed polyp, incomplete prep, risk of anesthesia  - Patient currently taking 1 fiber capsule per day, recommended increasing or transitioning to powdered supplement 1 tablespoon/day  Orders:    Colonoscopy; Future    polyethylene glycol (Golytely) 4000 mL solution; Take 4,000 mL by mouth once for 1 dose Take 4000 mL by mouth once for 1 dose. Use as directed    Family history of colonic polyps  Patient reports degree relatives with colon polyps although unsure what type.    - Recommend colonoscopy as above  Orders:    Colonoscopy; Future    polyethylene glycol (Golytely) 4000 mL solution; Take 4,000 mL by mouth once for 1 dose Take 4000 mL by mouth once for 1 dose. Use as directed    Screening for colon cancer    Orders:    Ambulatory Referral to Gastroenterology      History of Present Illness   Shaun Mohr is a 75 y.o. male with history of hypertension, CAD s/p coronary stent on Plavix, history of smoking, hyperlipidemia, who presents as a new patient for colonoscopy consult in the setting of irregular bowel habits and family history of colon polyps.    Prior colonoscopy performed 2018 with findings of large internal  hemorrhoids, diverticulosis in the entire examined colon, significant looping of the colon, otherwise normal.  Repeat recommended in 10 years.  Patient reports that recently multiple siblings have had colon polyps, unsure what kind.  Patient also reports that his bowel movements have become harder, but then fluctuate to being loose recently despite no change to his diet or routine.  Been taking 1 fiber capsule per day.    He denies any upper GI symptoms such as nausea, vomiting, heartburn/reflux, dysphagia, odynophagia, abdominal pain.  He occasionally drinks alcohol.  He has a remote history of smoking but quit over 40 years ago.    CBC from January 2025 with hemoglobin mildly decreased at 13.5.  BMP with BUN of 21.  Normal creatinine.  Otherwise unremarkable.    No recent abdominal imaging.    HPI  History obtained from: patient  Review of Systems   Constitutional:  Negative for appetite change, fatigue, fever and unexpected weight change.   HENT:  Negative for sore throat and trouble swallowing.    Gastrointestinal:  Negative for abdominal distention, abdominal pain, anal bleeding, blood in stool, constipation, diarrhea, nausea, rectal pain and vomiting.    A complete review of systems is negative other than that noted above in the HPI.         Objective   There were no vitals taken for this visit.    Physical Exam  Vitals and nursing note reviewed.   Constitutional:       General: He is not in acute distress.     Appearance: He is well-developed and normal weight.   HENT:      Head: Normocephalic and atraumatic.     Eyes:      General: No scleral icterus.     Conjunctiva/sclera: Conjunctivae normal.       Cardiovascular:      Rate and Rhythm: Normal rate and regular rhythm.      Heart sounds: No murmur heard.  Pulmonary:      Effort: Pulmonary effort is normal. No respiratory distress.      Breath sounds: Normal breath sounds.   Abdominal:      General: Abdomen is flat. Bowel sounds are normal. There is no  distension.      Palpations: Abdomen is soft.      Tenderness: There is no abdominal tenderness. There is no guarding or rebound.     Musculoskeletal:         General: No swelling.      Cervical back: Neck supple.     Skin:     General: Skin is warm and dry.      Capillary Refill: Capillary refill takes less than 2 seconds.      Coloration: Skin is not jaundiced.     Neurological:      General: No focal deficit present.      Mental Status: He is alert and oriented to person, place, and time.     Psychiatric:         Mood and Affect: Mood normal.         Behavior: Behavior normal.          Lab Results: I personally reviewed relevant lab results.

## 2025-07-16 DIAGNOSIS — E78.5 HYPERLIPIDEMIA, UNSPECIFIED HYPERLIPIDEMIA TYPE: ICD-10-CM

## 2025-07-16 DIAGNOSIS — I10 BENIGN ESSENTIAL HYPERTENSION: ICD-10-CM

## 2025-07-16 DIAGNOSIS — Z95.5 S/P PRIMARY ANGIOPLASTY WITH CORONARY STENT: ICD-10-CM

## 2025-07-17 DIAGNOSIS — Z95.5 S/P PRIMARY ANGIOPLASTY WITH CORONARY STENT: ICD-10-CM

## 2025-07-17 DIAGNOSIS — I10 BENIGN ESSENTIAL HYPERTENSION: ICD-10-CM

## 2025-07-17 DIAGNOSIS — E78.5 HYPERLIPIDEMIA, UNSPECIFIED HYPERLIPIDEMIA TYPE: ICD-10-CM

## 2025-07-17 RX ORDER — METOPROLOL SUCCINATE 25 MG/1
25 TABLET, EXTENDED RELEASE ORAL DAILY
Qty: 90 TABLET | Refills: 1 | Status: SHIPPED | OUTPATIENT
Start: 2025-07-17

## 2025-07-17 RX ORDER — ATORVASTATIN CALCIUM 80 MG/1
TABLET, FILM COATED ORAL
Qty: 90 TABLET | Refills: 1 | OUTPATIENT
Start: 2025-07-17

## 2025-07-17 RX ORDER — ATORVASTATIN CALCIUM 80 MG/1
80 TABLET, FILM COATED ORAL EVERY EVENING
Qty: 90 TABLET | Refills: 1 | Status: SHIPPED | OUTPATIENT
Start: 2025-07-17

## 2025-07-17 RX ORDER — LOSARTAN POTASSIUM 25 MG/1
25 TABLET ORAL DAILY
Qty: 90 TABLET | Refills: 1 | Status: SHIPPED | OUTPATIENT
Start: 2025-07-17

## 2025-07-17 RX ORDER — METOPROLOL SUCCINATE 25 MG/1
25 TABLET, EXTENDED RELEASE ORAL DAILY
Qty: 90 TABLET | Refills: 1 | OUTPATIENT
Start: 2025-07-17

## 2025-07-17 RX ORDER — LOSARTAN POTASSIUM 25 MG/1
25 TABLET ORAL DAILY
Qty: 90 TABLET | Refills: 1 | OUTPATIENT
Start: 2025-07-17

## 2025-07-17 NOTE — TELEPHONE ENCOUNTER
Medication: atorvastatin 80mg    Dose/Frequency: 1 tablet daily    Quantity: 90    Medication: losartan 25mg    Dose/Frequency: 1 tablet daily    Quantity: 90    Medication: metoprolol succinate 25mg    Dose/Frequency: 1 tablet daily    Quantity: 90    Pharmacy: [facility]    Office:   [] PCP/Provider -   [x] Speciality/Provider -     Does the patient have enough for 3 days?   [x] Yes   [] No - Send as HP to POD

## 2025-08-15 ENCOUNTER — TELEPHONE (OUTPATIENT)
Dept: GASTROENTEROLOGY | Facility: AMBULARY SURGERY CENTER | Age: 75
End: 2025-08-15

## (undated) DEVICE — PLUMEPEN PRO 10FT

## (undated) DEVICE — INTENDED FOR TISSUE SEPARATION, AND OTHER PROCEDURES THAT REQUIRE A SHARP SURGICAL BLADE TO PUNCTURE OR CUT.: Brand: BARD-PARKER ® CARBON RIB-BACK BLADES

## (undated) DEVICE — BETHLEHEM UNIVERSAL MINOR GEN: Brand: CARDINAL HEALTH

## (undated) DEVICE — TOWEL SURG XR DETECT GREEN STRL RFD

## (undated) DEVICE — CHLORAPREP HI-LITE 26ML ORANGE

## (undated) DEVICE — DISPOSABLE EQUIPMENT COVER: Brand: SMALL TOWEL DRAPE

## (undated) DEVICE — ADHESIVE SKIN HIGH VISCOSITY EXOFIN 1ML

## (undated) DEVICE — ANTIBACTERIAL VIOLET BRAIDED (POLYGLACTIN 910), SYNTHETIC ABSORBABLE SUTURE: Brand: COATED VICRYL

## (undated) DEVICE — DRAPE EQUIPMENT RF WAND

## (undated) DEVICE — NEURO PATTIES 1/2 X 1 1/2

## (undated) DEVICE — DRESSING MEPILEX AG BORDER 4 X 4 IN

## (undated) DEVICE — SNAP KOVER: Brand: UNBRANDED

## (undated) DEVICE — PENCIL ELECTROSURG E-Z CLEAN -0035H

## (undated) DEVICE — INTENDED FOR TISSUE SEPARATION, AND OTHER PROCEDURES THAT REQUIRE A SHARP SURGICAL BLADE TO PUNCTURE OR CUT.: Brand: BARD-PARKER SAFETY BLADES SIZE 15, STERILE

## (undated) DEVICE — PAD GROUNDING ADULT

## (undated) DEVICE — NEEDLE 22 G X 1 1/2 SAFETY

## (undated) DEVICE — SUT VICRYL 2-0 SH 27 IN UNDYED J417H

## (undated) DEVICE — ELECTRODE BLADE MOD  E-Z CLEAN 6.5IN -0014M

## (undated) DEVICE — NEEDLE SPINAL18G X 3.5 IN QUINCKE

## (undated) DEVICE — LIGHT HANDLE COVER SLEEVE DISP BLUE STELLAR

## (undated) DEVICE — HEMOSTATIC MATRIX SURGIFLO 8ML W/THROMBIN

## (undated) DEVICE — SUT MONOCRYL 4-0 PS-2 27 IN Y426H

## (undated) DEVICE — TIBURON SPLIT SHEET: Brand: CONVERTORS

## (undated) DEVICE — VIAL DECANTER

## (undated) DEVICE — GLOVE INDICATOR PI UNDERGLOVE SZ 7.5 BLUE

## (undated) DEVICE — INTENDED FOR TISSUE SEPARATION, AND OTHER PROCEDURES THAT REQUIRE A SHARP SURGICAL BLADE TO PUNCTURE OR CUT.: Brand: BARD-PARKER SAFETY BLADES SIZE 10, STERILE

## (undated) DEVICE — GLOVE SRG BIOGEL 7.5

## (undated) DEVICE — TOOL MR8-SP14MH30T MR8 14CM SP MH 3F 3MM: Brand: MIDAS REX MR8

## (undated) DEVICE — SUT STRATAFIX SPIRAL MONOCRYL PLUS 4-0 PS-2 45CM SXMP1B118

## (undated) DEVICE — Device

## (undated) DEVICE — GLOVE SRG BIOGEL ORTHOPEDIC 8

## (undated) DEVICE — BETHLEHEM UNIVERSAL SPINE, KIT: Brand: CARDINAL HEALTH

## (undated) DEVICE — DRAPE MICROSCOPE OPMI PENTERO